# Patient Record
Sex: MALE | Race: WHITE | NOT HISPANIC OR LATINO | Employment: OTHER | URBAN - METROPOLITAN AREA
[De-identification: names, ages, dates, MRNs, and addresses within clinical notes are randomized per-mention and may not be internally consistent; named-entity substitution may affect disease eponyms.]

---

## 2017-02-04 DIAGNOSIS — I10 ESSENTIAL HYPERTENSION: ICD-10-CM

## 2017-02-05 RX ORDER — AMLODIPINE BESYLATE 5 MG/1
TABLET ORAL
Qty: 90 TABLET | Refills: 0 | Status: SHIPPED | OUTPATIENT
Start: 2017-02-05 | End: 2017-04-05 | Stop reason: SDUPTHER

## 2017-02-06 RX ORDER — LOSARTAN POTASSIUM 100 MG/1
TABLET ORAL
Qty: 90 TABLET | Refills: 0 | Status: SHIPPED | OUTPATIENT
Start: 2017-02-06 | End: 2017-04-05 | Stop reason: SDUPTHER

## 2017-04-05 ENCOUNTER — OFFICE VISIT (OUTPATIENT)
Dept: FAMILY MEDICINE | Facility: CLINIC | Age: 67
End: 2017-04-05
Payer: COMMERCIAL

## 2017-04-05 VITALS
HEIGHT: 72 IN | TEMPERATURE: 99 F | DIASTOLIC BLOOD PRESSURE: 98 MMHG | BODY MASS INDEX: 27.04 KG/M2 | OXYGEN SATURATION: 96 % | HEART RATE: 82 BPM | WEIGHT: 199.63 LBS | SYSTOLIC BLOOD PRESSURE: 146 MMHG

## 2017-04-05 DIAGNOSIS — Q64.32 CONGENITAL STRICTURE OF URETHRA: ICD-10-CM

## 2017-04-05 DIAGNOSIS — Z12.11 COLON CANCER SCREENING: ICD-10-CM

## 2017-04-05 DIAGNOSIS — Z00.00 ROUTINE MEDICAL EXAM: Primary | ICD-10-CM

## 2017-04-05 DIAGNOSIS — E66.3 OVERWEIGHT (BMI 25.0-29.9): ICD-10-CM

## 2017-04-05 DIAGNOSIS — F43.9 STRESS: ICD-10-CM

## 2017-04-05 DIAGNOSIS — J30.89 NON-SEASONAL ALLERGIC RHINITIS, UNSPECIFIED ALLERGIC RHINITIS TRIGGER: ICD-10-CM

## 2017-04-05 DIAGNOSIS — K21.9 GASTROESOPHAGEAL REFLUX DISEASE, ESOPHAGITIS PRESENCE NOT SPECIFIED: ICD-10-CM

## 2017-04-05 DIAGNOSIS — I10 ESSENTIAL HYPERTENSION: ICD-10-CM

## 2017-04-05 DIAGNOSIS — R97.20 ELEVATED PSA: ICD-10-CM

## 2017-04-05 DIAGNOSIS — R73.03 PREDIABETES: ICD-10-CM

## 2017-04-05 DIAGNOSIS — F41.9 MILD ANXIETY: ICD-10-CM

## 2017-04-05 PROCEDURE — 99396 PREV VISIT EST AGE 40-64: CPT | Mod: S$GLB,,, | Performed by: INTERNAL MEDICINE

## 2017-04-05 PROCEDURE — 99999 PR PBB SHADOW E&M-EST. PATIENT-LVL III: CPT | Mod: PBBFAC,,, | Performed by: INTERNAL MEDICINE

## 2017-04-05 PROCEDURE — 3077F SYST BP >= 140 MM HG: CPT | Mod: S$GLB,,, | Performed by: INTERNAL MEDICINE

## 2017-04-05 PROCEDURE — 3080F DIAST BP >= 90 MM HG: CPT | Mod: S$GLB,,, | Performed by: INTERNAL MEDICINE

## 2017-04-05 RX ORDER — AMLODIPINE BESYLATE 5 MG/1
TABLET ORAL
Qty: 90 TABLET | Refills: 1 | Status: SHIPPED | OUTPATIENT
Start: 2017-04-05 | End: 2017-10-11 | Stop reason: SDUPTHER

## 2017-04-05 RX ORDER — FLUTICASONE PROPIONATE 50 MCG
1 SPRAY, SUSPENSION (ML) NASAL DAILY
Qty: 48 G | Refills: 1 | Status: SHIPPED | OUTPATIENT
Start: 2017-04-05 | End: 2018-01-18 | Stop reason: SDUPTHER

## 2017-04-05 RX ORDER — CITALOPRAM 10 MG/1
10 TABLET ORAL DAILY
Qty: 90 TABLET | Refills: 1 | Status: SHIPPED | OUTPATIENT
Start: 2017-04-05 | End: 2017-10-11 | Stop reason: SDUPTHER

## 2017-04-05 RX ORDER — LOSARTAN POTASSIUM 100 MG/1
100 TABLET ORAL DAILY
Qty: 90 TABLET | Refills: 1 | Status: SHIPPED | OUTPATIENT
Start: 2017-04-05 | End: 2017-10-11 | Stop reason: SDUPTHER

## 2017-04-05 NOTE — MR AVS SNAPSHOT
[facility]  [address]  [place] [address]  Phone: [phone]  Fax: [phone]                  Vinnie Scott   2017 9:00 AM   Office Visit    Description:  Male : 1950   Provider:  Janine Acuña MD   Department:  [facility]           Reason for Visit     Anxiety     Hypertension     Cough           Diagnoses this Visit        Comments    Routine medical exam    -  Primary     Essential hypertension         Gastroesophageal reflux disease, esophagitis presence not specified         Prediabetes         Non-seasonal allergic rhinitis, unspecified allergic rhinitis trigger         Stress         Overweight (BMI 25.0-29.9)         Colon cancer screening         Elevated PSA         Mild anxiety                To Do List           Future Appointments        Provider Department Dept Phone    2017 8:15 AM LAB, [place] [facility] [phone]    2017 9:15 AM Eligio Culp Jr., MD [facility] [phone]      Goals (5 Years of Data)              3/11/16    4/9/15    3/17/14    HEMOGLOBIN A1C < 7.0   6.1  5.8  5.9    Related Problems    Prediabetes    Increase water intake             Follow-Up and Disposition     Return in about 3 months (around 2017), or if symptoms worsen or fail to improve, for follow up chronic medical conditions..       These Medications        Disp Refills Start End    amlodipine (NORVASC) 5 MG tablet 90 tablet 1 2017     TAKE 1 TABLET(5 MG) BY MOUTH EVERY DAY    Pharmacy: [facility] - [facility] [address] AT [address] [facility] Ph #: [phone]       losartan (COZAAR) 100 MG tablet 90 tablet 1 2017     Take 1 tablet (100 mg total) by mouth once daily. - Oral    Pharmacy: [facility] - [facility] [address] AT [address] [facility] Ph #: [phone]       fluticasone  (FLONASE) 50 mcg/actuation nasal spray 48 g 1 4/5/2017     1 spray by Each Nare route once daily. - Each Nare    Pharmacy: [facility] - [facility] [address] AT [address] to [facility] Ph #: [phone]       citalopram (CELEXA) 10 MG tablet 90 tablet 1 4/5/2017 4/5/2018    Take 1 tablet (10 mg total) by mouth once daily. - Oral    Pharmacy: [facility] - [place], [place] - [address] AT [address] to [facility] Ph #: [phone]         [facility]     [facility] On Call Nurse Care Line - 24/7 Assistance  Unless otherwise directed by your provider, please contact [facility], our nurse care line that is available for 24/7 assistance.     Registered nurses in the [facility] provide: appointment scheduling, clinical advisement, health education, and other advisory services.  Call: [phone] (toll free)               Medications           Message regarding Medications     Verify the changes and/or additions to your medication regime listed below are the same as discussed with your clinician today.  If any of these changes or additions are incorrect, please notify your healthcare provider.        START taking these NEW medications        Refills    citalopram (CELEXA) 10 MG tablet 1    Sig: Take 1 tablet (10 mg total) by mouth once daily.    Class: Normal    Route: Oral      CHANGE how you are taking these medications     Start Taking Instead of    losartan (COZAAR) 100 MG tablet losartan (COZAAR) 100 MG tablet    Dosage:  Take 1 tablet (100 mg total) by mouth once daily. Dosage:  TAKE 1 TABLET BY MOUTH DAILY    Reason for Change:  Reorder            Verify that the below list of medications is an accurate representation of the medications you are currently taking.  If none reported, the list may be blank. If incorrect, please contact your healthcare provider. Carry this list with you in case of emergency.            Current Medications     amlodipine (NORVASC) 5 MG tablet TAKE 1 TABLET(5 MG) BY MOUTH EVERY DAY    diphenhydrAMINE (SOMINEX) 25 mg tablet Take 25 mg by mouth nightly as needed for Insomnia.    fexofenadine (ALLEGRA) 180 MG tablet Take 1 tablet (180 mg total) by mouth once daily.    fluticasone (FLONASE) 50 mcg/actuation nasal spray 1 spray by Each Nare route once daily.    losartan (COZAAR) 100 MG tablet Take 1 tablet (100 mg total) by mouth once daily.    omega-3 fatty acids-vitamin E (FISH OIL) 1,000 mg Cap Take 1 capsule by mouth Daily. 1 Capsule Oral Every day    omeprazole (PRILOSEC) 20 MG capsule Take 2 capsules (40 mg total) by mouth once daily.    alprazolam (XANAX) 0.25 MG tablet Take 1 tablet (0.25 mg total) by mouth daily as needed for Anxiety.    citalopram (CELEXA) 10 MG tablet Take 1 tablet (10 mg total) by mouth once daily.    levocetirizine (XYZAL) 5 MG tablet Take 1 tablet (5 mg total) by mouth every evening.    loratadine (CLARITIN) 10 mg tablet Take 1 tablet (10 mg total) by mouth once daily.    sildenafil (VIAGRA) 100 MG tablet Take 1 tablet (100 mg total) by mouth daily as needed for Erectile Dysfunction.           Clinical Reference Information           Your Vitals Were     BP Pulse Temp Height Weight SpO2    146/98 82 98.9 °F (37.2 °C) (Oral) 6' (1.829 m) 90.5 kg (199 lb 10 oz) 96%    BMI                27.07 kg/m2          Blood Pressure          Most Recent Value    BP  (!)  146/98      Allergies as of 4/5/2017     Ciprofloxacin    Lisinopril      Immunizations Administered on Date of Encounter - 4/5/2017     None      Orders Placed During Today's Visit      Normal Orders This Visit    Case request GI: COLONOSCOPY     Future Labs/Procedures Expected by Expires    CBC auto differential  4/5/2017 4/5/2018    Comprehensive metabolic panel  4/5/2017 4/5/2018    Hemoglobin A1c  4/5/2017 4/5/2018    Lipid panel  4/5/2017 4/5/2018    PSA, Screening  4/5/2017 4/5/2018      Language  Assistance Services     ATTENTION: Language assistance services are available, free of charge. Please call [phone].      ATENCIÓN: Si habla barb, tiene a petit disposición servicios gratuitos de asistencia lingüística. Llame al [phone].     CHÚ Ý: N?u b?n nói Ti?ng Vi?t, có các d?ch v? h? tr? ngôn ng? mi?n phí dành cho b?n. G?i s? [phone].         [facility] complies with applicable Federal civil rights laws and does not discriminate on the basis of race, color, national origin, age, disability, or sex.

## 2017-04-07 ENCOUNTER — LAB VISIT (OUTPATIENT)
Dept: LAB | Facility: HOSPITAL | Age: 67
End: 2017-04-07
Attending: INTERNAL MEDICINE
Payer: COMMERCIAL

## 2017-04-07 DIAGNOSIS — R97.20 ELEVATED PSA: ICD-10-CM

## 2017-04-07 DIAGNOSIS — I10 ESSENTIAL HYPERTENSION: ICD-10-CM

## 2017-04-07 DIAGNOSIS — R73.03 PREDIABETES: ICD-10-CM

## 2017-04-07 LAB
ALBUMIN SERPL BCP-MCNC: 3.9 G/DL
ALP SERPL-CCNC: 107 U/L
ALT SERPL W/O P-5'-P-CCNC: 114 U/L
ANION GAP SERPL CALC-SCNC: 12 MMOL/L
AST SERPL-CCNC: 103 U/L
BASOPHILS # BLD AUTO: 0.04 K/UL
BASOPHILS NFR BLD: 0.7 %
BILIRUB SERPL-MCNC: 1.4 MG/DL
BUN SERPL-MCNC: 9 MG/DL
CALCIUM SERPL-MCNC: 9.4 MG/DL
CHLORIDE SERPL-SCNC: 103 MMOL/L
CHOLEST/HDLC SERPL: 2.6 {RATIO}
CO2 SERPL-SCNC: 25 MMOL/L
COMPLEXED PSA SERPL-MCNC: 3 NG/ML
CREAT SERPL-MCNC: 0.8 MG/DL
DIFFERENTIAL METHOD: ABNORMAL
EOSINOPHIL # BLD AUTO: 0.2 K/UL
EOSINOPHIL NFR BLD: 3.1 %
ERYTHROCYTE [DISTWIDTH] IN BLOOD BY AUTOMATED COUNT: 13.9 %
EST. GFR  (AFRICAN AMERICAN): >60 ML/MIN/1.73 M^2
EST. GFR  (NON AFRICAN AMERICAN): >60 ML/MIN/1.73 M^2
ESTIMATED AVG GLUCOSE: 105 MG/DL
GLUCOSE SERPL-MCNC: 89 MG/DL
HBA1C MFR BLD HPLC: 5.3 %
HCT VFR BLD AUTO: 46.9 %
HDL/CHOLESTEROL RATIO: 37.8 %
HDLC SERPL-MCNC: 209 MG/DL
HDLC SERPL-MCNC: 79 MG/DL
HGB BLD-MCNC: 15.4 G/DL
LDLC SERPL CALC-MCNC: 121.4 MG/DL
LYMPHOCYTES # BLD AUTO: 1.5 K/UL
LYMPHOCYTES NFR BLD: 25.9 %
MCH RBC QN AUTO: 32 PG
MCHC RBC AUTO-ENTMCNC: 32.8 %
MCV RBC AUTO: 98 FL
MONOCYTES # BLD AUTO: 0.7 K/UL
MONOCYTES NFR BLD: 11.7 %
NEUTROPHILS # BLD AUTO: 3.4 K/UL
NEUTROPHILS NFR BLD: 58.3 %
NONHDLC SERPL-MCNC: 130 MG/DL
PLATELET # BLD AUTO: 219 K/UL
PMV BLD AUTO: 9.1 FL
POTASSIUM SERPL-SCNC: 4.5 MMOL/L
PROT SERPL-MCNC: 7.6 G/DL
RBC # BLD AUTO: 4.81 M/UL
SODIUM SERPL-SCNC: 140 MMOL/L
TRIGL SERPL-MCNC: 43 MG/DL
WBC # BLD AUTO: 5.88 K/UL

## 2017-04-07 PROCEDURE — 84153 ASSAY OF PSA TOTAL: CPT

## 2017-04-07 PROCEDURE — 80053 COMPREHEN METABOLIC PANEL: CPT

## 2017-04-07 PROCEDURE — 36415 COLL VENOUS BLD VENIPUNCTURE: CPT | Mod: PO

## 2017-04-07 PROCEDURE — 80061 LIPID PANEL: CPT

## 2017-04-07 PROCEDURE — 85025 COMPLETE CBC W/AUTO DIFF WBC: CPT

## 2017-04-07 PROCEDURE — 83036 HEMOGLOBIN GLYCOSYLATED A1C: CPT

## 2017-04-10 ENCOUNTER — TELEPHONE (OUTPATIENT)
Dept: FAMILY MEDICINE | Facility: CLINIC | Age: 67
End: 2017-04-10

## 2017-04-10 DIAGNOSIS — R79.89 ELEVATED LFTS: Primary | ICD-10-CM

## 2017-04-10 NOTE — TELEPHONE ENCOUNTER
Please call patient: his recent blood work all looked ok except his liver enzymes have increased significantly. I would like to do some additional blood work and check a liver ultrasound to further evaluate.

## 2017-04-11 ENCOUNTER — OFFICE VISIT (OUTPATIENT)
Dept: UROLOGY | Facility: CLINIC | Age: 67
End: 2017-04-11
Payer: COMMERCIAL

## 2017-04-11 VITALS
HEIGHT: 72 IN | BODY MASS INDEX: 27.14 KG/M2 | HEART RATE: 77 BPM | WEIGHT: 200.38 LBS | DIASTOLIC BLOOD PRESSURE: 99 MMHG | SYSTOLIC BLOOD PRESSURE: 182 MMHG

## 2017-04-11 DIAGNOSIS — N35.914 ANTERIOR URETHRAL STRICTURE: Primary | ICD-10-CM

## 2017-04-11 DIAGNOSIS — R33.9 RETENTION OF URINE: ICD-10-CM

## 2017-04-11 PROCEDURE — 99213 OFFICE O/P EST LOW 20 MIN: CPT | Mod: S$GLB,,, | Performed by: UROLOGY

## 2017-04-11 PROCEDURE — 3077F SYST BP >= 140 MM HG: CPT | Mod: S$GLB,,, | Performed by: UROLOGY

## 2017-04-11 PROCEDURE — 99999 PR PBB SHADOW E&M-EST. PATIENT-LVL III: CPT | Mod: PBBFAC,,, | Performed by: UROLOGY

## 2017-04-11 PROCEDURE — 1160F RVW MEDS BY RX/DR IN RCRD: CPT | Mod: S$GLB,,, | Performed by: UROLOGY

## 2017-04-11 PROCEDURE — 3080F DIAST BP >= 90 MM HG: CPT | Mod: S$GLB,,, | Performed by: UROLOGY

## 2017-04-11 NOTE — PROGRESS NOTES
Subjective:       Patient ID: Vinnie Scott is a 66 y.o. male.    Chief Complaint: Follow-up (per patient he stated that he a prostate infection was given an antibiotics)    HPI patient is here to do a history of urethral stricture.  He's been using the VPI dilator intermittently but feels like he would like to do CIC on a daily basis.  I suggest a cystoscopy but he like to hold off.  He states that after he dilates he has pain for a couple days and is unable to do it again until I pain goes away his postvoid residual is 60 cc today and his urine is clear.  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    Mild anxiety     Overweight     Personal history of colonic polyps October 2010, May 2015    Prediabetes     Urinary anastomotic stricture        Past Surgical History:   Procedure Laterality Date    ADENOIDECTOMY      FRACTURE SURGERY      Left collarbone    TONSILLECTOMY      urinary stricture clearing      VASECTOMY         Family History   Problem Relation Age of Onset    Lung cancer Father     Diabetes Father     Uterine cancer Mother     Heart disease Mother     Drug abuse Brother     Alcohol abuse Brother     Prostate cancer Neg Hx     Colon cancer Neg Hx        Social History     Social History    Marital status:      Spouse name: N/A    Number of children: 2    Years of education: N/A     Occupational History    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[employer]     Social History Main Topics    Smoking status: Former Smoker     Types:  Cigarettes     Quit date: 9/20/2011    Smokeless tobacco: Not on file    Alcohol use Yes      Comment: 4-5 beers daily    Drug use: No    Sexual activity: Not on file     Other Topics Concern    Not on file     Social History Narrative       Allergies:  Ciprofloxacin and Lisinopril    Medications:    Current Outpatient Prescriptions:     amlodipine (NORVASC) 5 MG tablet, TAKE 1 TABLET(5 MG) BY MOUTH EVERY DAY, Disp: 90 tablet, Rfl: 1    citalopram (CELEXA) 10 MG tablet, Take 1 tablet (10 mg total) by mouth once daily., Disp: 90 tablet, Rfl: 1    diphenhydrAMINE (SOMINEX) 25 mg tablet, Take 25 mg by mouth nightly as needed for Insomnia., Disp: , Rfl:     fexofenadine (ALLEGRA) 180 MG tablet, Take 1 tablet (180 mg total) by mouth once daily., Disp: 90 tablet, Rfl: 0    fluticasone (FLONASE) 50 mcg/actuation nasal spray, 1 spray by Each Nare route once daily., Disp: 48 g, Rfl: 1    losartan (COZAAR) 100 MG tablet, Take 1 tablet (100 mg total) by mouth once daily., Disp: 90 tablet, Rfl: 1    omega-3 fatty acids-vitamin E (FISH OIL) 1,000 mg Cap, Take 1 capsule by mouth Daily. 1 Capsule Oral Every day, Disp: , Rfl:     omeprazole (PRILOSEC) 20 MG capsule, Take 2 capsules (40 mg total) by mouth once daily., Disp: 180 capsule, Rfl: 1    alprazolam (XANAX) 0.25 MG tablet, Take 1 tablet (0.25 mg total) by mouth daily as needed for Anxiety., Disp: 90 tablet, Rfl: 0    levocetirizine (XYZAL) 5 MG tablet, Take 1 tablet (5 mg total) by mouth every evening., Disp: 30 tablet, Rfl: 2    loratadine (CLARITIN) 10 mg tablet, Take 1 tablet (10 mg total) by mouth once daily., Disp: 90 tablet, Rfl: 1    sildenafil (VIAGRA) 100 MG tablet, Take 1 tablet (100 mg total) by mouth daily as needed for Erectile Dysfunction., Disp: 6 tablet, Rfl: 12    Review of Systems   Constitutional: Negative for activity change, appetite change, chills, diaphoresis, fatigue, fever and unexpected weight change.   HENT: Negative for  congestion, dental problem, hearing loss, mouth sores, postnasal drip, rhinorrhea, sinus pressure and trouble swallowing.    Eyes: Negative for pain, discharge and itching.   Respiratory: Negative for apnea, cough, choking, chest tightness, shortness of breath and wheezing.    Cardiovascular: Negative for chest pain, palpitations and leg swelling.   Gastrointestinal: Negative for abdominal distention, abdominal pain, anal bleeding, blood in stool, constipation, diarrhea, nausea, rectal pain and vomiting.   Endocrine: Negative for polydipsia and polyuria.   Genitourinary: Negative for decreased urine volume, difficulty urinating, discharge, dysuria, enuresis, flank pain, frequency, genital sores, hematuria, penile pain, penile swelling, scrotal swelling, testicular pain and urgency.   Musculoskeletal: Negative for arthralgias, back pain and myalgias.   Skin: Negative for color change, rash and wound.   Neurological: Negative for dizziness, syncope, speech difficulty, light-headedness and headaches.   Hematological: Negative for adenopathy. Does not bruise/bleed easily.   Psychiatric/Behavioral: Negative for behavioral problems, confusion, hallucinations and sleep disturbance.       Objective:      Physical Exam   Constitutional: He appears well-developed.   HENT:   Head: Normocephalic.   Cardiovascular: Normal rate.    Pulmonary/Chest: Effort normal.   Abdominal: Soft.   Genitourinary: Prostate normal.   Genitourinary Comments: 25-30 g benign   Neurological: He is alert.   Skin: Skin is warm.     Psychiatric: He has a normal mood and affect.       Assessment:       1. Anterior urethral stricture        Plan:       Vinnie Luu was seen today for follow-up.    Diagnoses and all orders for this visit:    Anterior urethral stricture        patient will do CIC on a daily basis with a 14 Romanian straight catheter.  He will let me know if he has any trouble and he may end up needing to have cystoscopy I'll see him back in  6 months

## 2017-04-12 ENCOUNTER — LAB VISIT (OUTPATIENT)
Dept: LAB | Facility: HOSPITAL | Age: 67
End: 2017-04-12
Attending: INTERNAL MEDICINE
Payer: COMMERCIAL

## 2017-04-12 DIAGNOSIS — R79.89 ELEVATED LFTS: ICD-10-CM

## 2017-04-12 LAB
ALBUMIN SERPL BCP-MCNC: 4 G/DL
ALP SERPL-CCNC: 106 U/L
ALT SERPL W/O P-5'-P-CCNC: 119 U/L
AST SERPL-CCNC: 118 U/L
BILIRUB DIRECT SERPL-MCNC: 0.3 MG/DL
BILIRUB SERPL-MCNC: 0.5 MG/DL
HAV IGM SERPL QL IA: NEGATIVE
HBV CORE IGM SERPL QL IA: NEGATIVE
HBV SURFACE AG SERPL QL IA: NEGATIVE
HCV AB SERPL QL IA: NEGATIVE
PROT SERPL-MCNC: 7.5 G/DL

## 2017-04-12 PROCEDURE — 80074 ACUTE HEPATITIS PANEL: CPT

## 2017-04-12 PROCEDURE — 36415 COLL VENOUS BLD VENIPUNCTURE: CPT | Mod: PO

## 2017-04-12 PROCEDURE — 80076 HEPATIC FUNCTION PANEL: CPT

## 2017-04-24 ENCOUNTER — HOSPITAL ENCOUNTER (OUTPATIENT)
Dept: RADIOLOGY | Facility: HOSPITAL | Age: 67
Discharge: HOME | End: 2017-04-24
Attending: INTERNAL MEDICINE
Payer: COMMERCIAL

## 2017-04-24 DIAGNOSIS — R79.89 ELEVATED LFTS: ICD-10-CM

## 2017-04-24 DIAGNOSIS — R79.89 ELEVATED LFTS: Primary | ICD-10-CM

## 2017-04-24 PROCEDURE — 76700 US EXAM ABDOM COMPLETE: CPT | Mod: 26,,, | Performed by: RADIOLOGY

## 2017-04-24 PROCEDURE — 76700 US EXAM ABDOM COMPLETE: CPT | Mod: TC

## 2017-04-30 ENCOUNTER — DOCUMENTATION ONLY (OUTPATIENT)
Dept: TRANSPLANT | Facility: CLINIC | Age: 67
End: 2017-04-30

## 2017-04-30 NOTE — NURSING
Pt records reviewed.   Pt will be referred to Hepatology.    Initial referral received  from the workque.   Referring Provider/diagnosis  Janine Acuña MD    Diagnosis: Elevated LFTs       Referral letter sent to provider and patient.

## 2017-04-30 NOTE — LETTER
April 30, 2017    Vinnie Scott  [address] [address]      Dear Vinnie Scott:    Your doctor has referred you to the [facility]. You will be contacted by our office and an initial appointment will then be scheduled for you.    We look forward to seeing you soon. If you have any further questions, please contact us at [phone].       Sincerely,        [facility]   [address] [place] [address]  [phone]

## 2017-04-30 NOTE — LETTER
April 30, 2017    Janine Acuña MD  [address]  [place] [address]      Dear Dr. Acuña    Patient: Vinnie Scott   MR Number: [id number]   YOB: 1950     Thank you for the referral of Vinnie Scott to the [facility]. An initial appointment will be scheduled for your patient with one of our Hepatologists.      Thank you again for your trust in our program.  If there is anything we can do for you or your staff, please feel free to contact us.        Sincerely,        [facility]  [address] [place] [address]  [phone]

## 2017-05-01 ENCOUNTER — TELEPHONE (OUTPATIENT)
Dept: HEPATOLOGY | Facility: CLINIC | Age: 67
End: 2017-05-01

## 2017-05-01 NOTE — TELEPHONE ENCOUNTER
----- Message from Lorie De León sent at 4/30/2017 11:50 AM CDT -----  Pt records reviewed.   Pt will be referred to Hepatology.    Initial referral received  from the workque.   Referring Provider/diagnosis  Janine Acuña MD   Diagnosis: Elevated LFTs      Referral letter sent to provider and patient.

## 2017-05-30 ENCOUNTER — ANESTHESIA EVENT (OUTPATIENT)
Dept: ENDOSCOPY | Facility: HOSPITAL | Age: 67
End: 2017-05-30
Payer: COMMERCIAL

## 2017-05-31 ENCOUNTER — HOSPITAL ENCOUNTER (OUTPATIENT)
Facility: HOSPITAL | Age: 67
Discharge: HOME | End: 2017-05-31
Attending: INTERNAL MEDICINE | Admitting: INTERNAL MEDICINE
Payer: COMMERCIAL

## 2017-05-31 ENCOUNTER — ANESTHESIA (OUTPATIENT)
Dept: ENDOSCOPY | Facility: HOSPITAL | Age: 67
End: 2017-05-31
Payer: COMMERCIAL

## 2017-05-31 VITALS
TEMPERATURE: 98 F | SYSTOLIC BLOOD PRESSURE: 108 MMHG | WEIGHT: 189 LBS | HEIGHT: 72 IN | DIASTOLIC BLOOD PRESSURE: 63 MMHG | OXYGEN SATURATION: 96 % | BODY MASS INDEX: 25.6 KG/M2 | HEART RATE: 82 BPM | RESPIRATION RATE: 18 BRPM

## 2017-05-31 DIAGNOSIS — Z12.11 COLON CANCER SCREENING: ICD-10-CM

## 2017-05-31 PROCEDURE — 27201012 HC FORCEPS, HOT/COLD, DISP: Performed by: INTERNAL MEDICINE

## 2017-05-31 PROCEDURE — 27201089 HC SNARE, DISP (ANY): Performed by: INTERNAL MEDICINE

## 2017-05-31 PROCEDURE — 88305 TISSUE EXAM BY PATHOLOGIST: CPT | Mod: 26,,, | Performed by: PATHOLOGY

## 2017-05-31 PROCEDURE — 63600175 PHARM REV CODE 636 W HCPCS: Performed by: NURSE ANESTHETIST, CERTIFIED REGISTERED

## 2017-05-31 PROCEDURE — 25000003 PHARM REV CODE 250: Performed by: NURSE ANESTHETIST, CERTIFIED REGISTERED

## 2017-05-31 PROCEDURE — 45380 COLONOSCOPY AND BIOPSY: CPT | Performed by: INTERNAL MEDICINE

## 2017-05-31 PROCEDURE — 37000008 HC ANESTHESIA 1ST 15 MINUTES: Performed by: INTERNAL MEDICINE

## 2017-05-31 PROCEDURE — 25000003 PHARM REV CODE 250: Performed by: ANESTHESIOLOGY

## 2017-05-31 PROCEDURE — 45388 COLONOSCOPY W/ABLATION: CPT | Performed by: INTERNAL MEDICINE

## 2017-05-31 PROCEDURE — D9220A PRA ANESTHESIA: Mod: PT,CRNA,, | Performed by: NURSE ANESTHETIST, CERTIFIED REGISTERED

## 2017-05-31 PROCEDURE — D9220A PRA ANESTHESIA: Mod: PT,ANES,, | Performed by: ANESTHESIOLOGY

## 2017-05-31 PROCEDURE — 88305 TISSUE EXAM BY PATHOLOGIST: CPT | Performed by: PATHOLOGY

## 2017-05-31 PROCEDURE — 37000009 HC ANESTHESIA EA ADD 15 MINS: Performed by: INTERNAL MEDICINE

## 2017-05-31 PROCEDURE — 45385 COLONOSCOPY W/LESION REMOVAL: CPT | Performed by: INTERNAL MEDICINE

## 2017-05-31 RX ORDER — PHENYLEPHRINE HCL IN 0.9% NACL 1 MG/10 ML
SYRINGE (ML) INTRAVENOUS
Status: DISCONTINUED
Start: 2017-05-31 | End: 2017-05-31 | Stop reason: HOSPADM

## 2017-05-31 RX ORDER — PROPOFOL 10 MG/ML
VIAL (ML) INTRAVENOUS
Status: DISCONTINUED
Start: 2017-05-31 | End: 2017-05-31 | Stop reason: HOSPADM

## 2017-05-31 RX ORDER — SODIUM CHLORIDE 9 MG/ML
INJECTION, SOLUTION INTRAVENOUS CONTINUOUS
Status: DISCONTINUED | OUTPATIENT
Start: 2017-05-31 | End: 2017-05-31 | Stop reason: HOSPADM

## 2017-05-31 RX ORDER — LIDOCAINE HYDROCHLORIDE 20 MG/ML
INJECTION, SOLUTION EPIDURAL; INFILTRATION; INTRACAUDAL; PERINEURAL
Status: DISCONTINUED
Start: 2017-05-31 | End: 2017-05-31 | Stop reason: HOSPADM

## 2017-05-31 RX ORDER — LIDOCAINE HYDROCHLORIDE 10 MG/ML
1 INJECTION, SOLUTION EPIDURAL; INFILTRATION; INTRACAUDAL; PERINEURAL ONCE
Status: DISCONTINUED | OUTPATIENT
Start: 2017-05-31 | End: 2017-05-31 | Stop reason: HOSPADM

## 2017-05-31 RX ORDER — LIDOCAINE HCL/PF 100 MG/5ML
SYRINGE (ML) INTRAVENOUS
Status: DISCONTINUED | OUTPATIENT
Start: 2017-05-31 | End: 2017-05-31

## 2017-05-31 RX ORDER — PHENYLEPHRINE HYDROCHLORIDE 10 MG/ML
INJECTION INTRAVENOUS
Status: DISCONTINUED | OUTPATIENT
Start: 2017-05-31 | End: 2017-05-31

## 2017-05-31 RX ORDER — PROPOFOL 10 MG/ML
VIAL (ML) INTRAVENOUS
Status: DISCONTINUED | OUTPATIENT
Start: 2017-05-31 | End: 2017-05-31

## 2017-05-31 RX ADMIN — PROPOFOL 50 MG: 10 INJECTION, EMULSION INTRAVENOUS at 08:05

## 2017-05-31 RX ADMIN — PROPOFOL 150 MG: 10 INJECTION, EMULSION INTRAVENOUS at 08:05

## 2017-05-31 RX ADMIN — SODIUM CHLORIDE: 0.9 INJECTION, SOLUTION INTRAVENOUS at 07:05

## 2017-05-31 RX ADMIN — PHENYLEPHRINE HYDROCHLORIDE 100 MCG: 10 INJECTION INTRAVENOUS at 08:05

## 2017-05-31 RX ADMIN — LIDOCAINE HYDROCHLORIDE 5 ML: 20 INJECTION, SOLUTION INTRAVENOUS at 08:05

## 2017-05-31 NOTE — H&P
Pre-Procedure H&P:  Reason for Procedure: h/o colon polyps    HPI:  Pt is a 67 y.o. 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    Mild anxiety     Overweight     Personal history of colonic polyps October 2010, May 2015    Prediabetes     Urinary anastomotic stricture        Past Surgical History:   Procedure Laterality Date    ADENOIDECTOMY      FRACTURE SURGERY      Left collarbone    TONSILLECTOMY      urinary stricture clearing      VASECTOMY         Family History   Problem Relation Age of Onset    Lung cancer Father     Diabetes Father     Uterine cancer Mother     Heart disease Mother     Drug abuse Brother     Alcohol abuse Brother     Prostate cancer Neg Hx     Colon cancer Neg Hx        Social History     Social History    Marital status:      Spouse name: N/A    Number of children: 2    Years of education: N/A     Occupational History    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[employer]     Social History Main Topics    Smoking status: Former Smoker     Types: Cigarettes     Quit date: 9/20/2011    Smokeless tobacco: Not on file    Alcohol use Yes      Comment: 4-5 beers daily    Drug use: No    Sexual activity: Not on file     Other Topics Concern    Not on file     Social History Narrative    No narrative on file       Endoscopic History:      Review of patient's allergies indicates:   Allergen Reactions    Ciprofloxacin      Other reaction(s): Muscle pain    Lisinopril Other (See Comments)     cough       No current facility-administered medications on file prior to encounter.      Current Outpatient  Prescriptions on File Prior to Encounter   Medication Sig Dispense Refill    amlodipine (NORVASC) 5 MG tablet TAKE 1 TABLET(5 MG) BY MOUTH EVERY DAY 90 tablet 1    citalopram (CELEXA) 10 MG tablet Take 1 tablet (10 mg total) by mouth once daily. 90 tablet 1    diphenhydrAMINE (SOMINEX) 25 mg tablet Take 25 mg by mouth nightly as needed for Insomnia.      fexofenadine (ALLEGRA) 180 MG tablet Take 1 tablet (180 mg total) by mouth once daily. 90 tablet 0    fluticasone (FLONASE) 50 mcg/actuation nasal spray 1 spray by Each Nare route once daily. 48 g 1    losartan (COZAAR) 100 MG tablet Take 1 tablet (100 mg total) by mouth once daily. 90 tablet 1    omega-3 fatty acids-vitamin E (FISH OIL) 1,000 mg Cap Take 1 capsule by mouth Daily. 1 Capsule Oral Every day      omeprazole (PRILOSEC) 20 MG capsule Take 2 capsules (40 mg total) by mouth once daily. 180 capsule 1    alprazolam (XANAX) 0.25 MG tablet Take 1 tablet (0.25 mg total) by mouth daily as needed for Anxiety. 90 tablet 0    levocetirizine (XYZAL) 5 MG tablet Take 1 tablet (5 mg total) by mouth every evening. 30 tablet 2    loratadine (CLARITIN) 10 mg tablet Take 1 tablet (10 mg total) by mouth once daily. 90 tablet 1    sildenafil (VIAGRA) 100 MG tablet Take 1 tablet (100 mg total) by mouth daily as needed for Erectile Dysfunction. 6 tablet 12       Current Facility-Administered Medications:     0.9%  NaCl infusion, , Intravenous, Continuous, Magalis Wagner MD, Last Rate: 10 mL/hr at 05/31/17 0711    lidocaine  20 mg/mL (2%) 20 mg/mL (2 %) injection, , , ,     lidocaine (PF) 10 mg/ml (1%) injection 10 mg, 1 mL, Intradermal, Once, Magalis Wagner MD    propofol (DIPRIVAN) 10 mg/mL IVP injection, , , ,     ROS: Negative x 10    Patient Vitals for the past 24 hrs:   BP Temp Temp src Pulse Resp SpO2 Height Weight   05/31/17 0702 (!) 141/76 97.6 °F (36.4 °C) Oral 95 18 96 % 6' (1.829 m) 85.7 kg (189 lb)       Gen: Well developed, well  nourished, no apparent distress  HEENT: Anicteric, PERRLA  CV: S1, S2, no murmers/rubs, non-displaced PMI  Lungs: CTA-B, normal excursion  Abd: Soft, NT, ND, normal BS's, no HSM  Ext: No c/c/e, 1+ DP pulses to BLE's  Neuro: CN II-XII grossly intact, no asterixis.  Skin: No rashes/lesions.  Psych: AA&O x 4    Assessment:  Pt. Is a 67 y.o. male with:  1. H/o colon polyps    Recommendations:  1. Colonoscopy  2. F/U with PCP      I would like to take this opportunity to thank you for this consult.  If you have any questions or concerns, please do not hesitate to contact me.

## 2017-05-31 NOTE — ANESTHESIA POSTPROCEDURE EVALUATION
Anesthesia Post Evaluation    Patient: Vinnie Scott    Procedure(s) Performed: Procedure(s) (LRB):  COLONOSCOPY (N/A)    Final Anesthesia Type: general  Patient location during evaluation: GI PACU  Patient participation: Yes- Able to Participate  Level of consciousness: awake and alert, awake and oriented  Post-procedure vital signs: reviewed and stable  Pain management: adequate  Airway patency: patent  PONV status at discharge: No PONV  Anesthetic complications: no      Cardiovascular status: blood pressure returned to baseline and hemodynamically stable  Respiratory status: unassisted, spontaneous ventilation and room air  Hydration status: euvolemic  Follow-up not needed.        Visit Vitals  BP 90/60 (BP Location: Left arm, Patient Position: Lying, BP Method: Automatic)   Pulse 67   Temp 36.4 °C (97.5 °F) (Oral)   Resp 18   Ht 6' (1.829 m)   Wt 85.7 kg (189 lb)   SpO2 96%   BMI 25.63 kg/m²       Pain/Josias Score: Pain Assessment Performed: Yes (5/31/2017  8:35 AM)  Presence of Pain: denies (5/31/2017  8:35 AM)  Josias Score: 9 (5/31/2017  8:35 AM)

## 2017-05-31 NOTE — ANESTHESIA PREPROCEDURE EVALUATION
05/31/2017  Vinnie Scott is a 67 y.o., male.    Anesthesia Evaluation    I have reviewed the Patient Summary Reports.     I have reviewed the Medications.     Review of Systems  Social:  Former Smoker, Alcohol Use    Cardiovascular:   Exercise tolerance: good Hypertension    Hepatic/GI:   GERD    Psych:   anxiety          Physical Exam  General:  Well nourished    Airway/Jaw/Neck:  Airway Findings: Mouth Opening: Normal Tongue: Normal  General Airway Assessment: Adult  Mallampati: II  TM Distance: Normal, at least 6 cm  Jaw/Neck Findings:  Neck ROM: Normal ROM      Dental:  Dental Findings: In tact   Chest/Lungs:  Chest/Lungs Findings: Clear to auscultation, Normal Respiratory Rate     Heart/Vascular:  Heart Findings: Rate: Normal        Mental Status:  Mental Status Findings:  Cooperative, Alert and Oriented         Anesthesia Plan  Type of Anesthesia, risks & benefits discussed:  Anesthesia Type:  general  Patient's Preference:   Intra-op Monitoring Plan: standard ASA monitors  Intra-op Monitoring Plan Comments:   Post Op Pain Control Plan:   Post Op Pain Control Plan Comments:   Induction:   IV  Beta Blocker:  Patient is not currently on a Beta-Blocker (No further documentation required).       Informed Consent: Patient understands risks and agrees with Anesthesia plan.  Questions answered. Anesthesia consent signed with patient.  ASA Score: 2     Day of Surgery Review of History & Physical:    H&P update referred to the surgeon.         Ready For Surgery From Anesthesia Perspective.

## 2017-06-05 ENCOUNTER — LAB VISIT (OUTPATIENT)
Dept: LAB | Facility: HOSPITAL | Age: 67
End: 2017-06-05
Attending: INTERNAL MEDICINE
Payer: COMMERCIAL

## 2017-06-05 DIAGNOSIS — D50.9 IRON DEFICIENCY ANEMIA, UNSPECIFIED: ICD-10-CM

## 2017-06-05 DIAGNOSIS — Z86.010 PERSONAL HISTORY OF COLONIC POLYPS: Primary | ICD-10-CM

## 2017-06-05 DIAGNOSIS — R94.5 NONSPECIFIC ABNORMAL RESULTS OF LIVER FUNCTION STUDY: ICD-10-CM

## 2017-06-05 LAB
A1AT SERPL-MCNC: 152 MG/DL
ALBUMIN SERPL BCP-MCNC: 3.7 G/DL
ALP SERPL-CCNC: 84 U/L
ALT SERPL W/O P-5'-P-CCNC: 28 U/L
AST SERPL-CCNC: 23 U/L
BILIRUB DIRECT SERPL-MCNC: 0.3 MG/DL
BILIRUB SERPL-MCNC: 0.9 MG/DL
CERULOPLASMIN SERPL-MCNC: 33 MG/DL
HBV CORE AB SERPL QL IA: POSITIVE
HEPATITIS A ANTIBODY, IGG: POSITIVE
IGA SERPL-MCNC: 366 MG/DL
INR PPP: 0.9
PROT SERPL-MCNC: 7.4 G/DL
PROTHROMBIN TIME: 9.9 SEC

## 2017-06-05 PROCEDURE — 86704 HEP B CORE ANTIBODY TOTAL: CPT

## 2017-06-05 PROCEDURE — 36415 COLL VENOUS BLD VENIPUNCTURE: CPT | Mod: PO

## 2017-06-05 PROCEDURE — 82390 ASSAY OF CERULOPLASMIN: CPT

## 2017-06-05 PROCEDURE — 80076 HEPATIC FUNCTION PANEL: CPT

## 2017-06-05 PROCEDURE — 85610 PROTHROMBIN TIME: CPT

## 2017-06-05 PROCEDURE — 86256 FLUORESCENT ANTIBODY TITER: CPT

## 2017-06-05 PROCEDURE — 82784 ASSAY IGA/IGD/IGG/IGM EACH: CPT

## 2017-06-05 PROCEDURE — 86790 VIRUS ANTIBODY NOS: CPT

## 2017-06-05 PROCEDURE — 83516 IMMUNOASSAY NONANTIBODY: CPT

## 2017-06-05 PROCEDURE — 86038 ANTINUCLEAR ANTIBODIES: CPT

## 2017-06-05 PROCEDURE — 86706 HEP B SURFACE ANTIBODY: CPT

## 2017-06-05 PROCEDURE — 82103 ALPHA-1-ANTITRYPSIN TOTAL: CPT

## 2017-06-06 LAB
ANA SER QL IF: NORMAL
HEP. B SURF AB, QUAL: NEGATIVE
HEP. B SURF AB, QUANT.: <3 MIU/ML
TTG IGA SER IA-ACNC: 7 UNITS

## 2017-06-08 LAB — SMOOTH MUSCLE AB TITR SER IF: ABNORMAL {TITER}

## 2017-06-20 ENCOUNTER — LAB VISIT (OUTPATIENT)
Dept: LAB | Facility: HOSPITAL | Age: 67
End: 2017-06-20
Attending: INTERNAL MEDICINE
Payer: COMMERCIAL

## 2017-06-20 DIAGNOSIS — R94.5 NONSPECIFIC ABNORMAL RESULTS OF LIVER FUNCTION STUDY: Primary | ICD-10-CM

## 2017-06-20 PROCEDURE — 36415 COLL VENOUS BLD VENIPUNCTURE: CPT | Mod: PO

## 2017-06-20 PROCEDURE — 87517 HEPATITIS B DNA QUANT: CPT

## 2017-06-22 LAB
HEPATITIS B VIRAL DNA - QUANTITATIVE: <10 IU/ML
HEPATITIS B VIRUS DNA: NOT DETECTED
LOG HBV IU/ML: <1 LOG (10) IU/ML

## 2017-07-05 ENCOUNTER — OFFICE VISIT (OUTPATIENT)
Dept: FAMILY MEDICINE | Facility: CLINIC | Age: 67
End: 2017-07-05
Payer: COMMERCIAL

## 2017-07-05 VITALS
DIASTOLIC BLOOD PRESSURE: 84 MMHG | OXYGEN SATURATION: 97 % | BODY MASS INDEX: 27.14 KG/M2 | WEIGHT: 200.38 LBS | HEIGHT: 72 IN | HEART RATE: 92 BPM | TEMPERATURE: 99 F | SYSTOLIC BLOOD PRESSURE: 132 MMHG

## 2017-07-05 DIAGNOSIS — F41.9 MILD ANXIETY: ICD-10-CM

## 2017-07-05 DIAGNOSIS — Z23 NEED FOR STREPTOCOCCUS PNEUMONIAE VACCINATION: ICD-10-CM

## 2017-07-05 DIAGNOSIS — E66.3 OVERWEIGHT (BMI 25.0-29.9): ICD-10-CM

## 2017-07-05 DIAGNOSIS — R05.9 COUGH: ICD-10-CM

## 2017-07-05 DIAGNOSIS — I10 ESSENTIAL HYPERTENSION: Primary | ICD-10-CM

## 2017-07-05 DIAGNOSIS — K21.9 GASTROESOPHAGEAL REFLUX DISEASE, ESOPHAGITIS PRESENCE NOT SPECIFIED: ICD-10-CM

## 2017-07-05 DIAGNOSIS — R79.89 ELEVATED LFTS: ICD-10-CM

## 2017-07-05 DIAGNOSIS — R73.03 PREDIABETES: ICD-10-CM

## 2017-07-05 DIAGNOSIS — J30.89 NON-SEASONAL ALLERGIC RHINITIS, UNSPECIFIED ALLERGIC RHINITIS TRIGGER: ICD-10-CM

## 2017-07-05 PROBLEM — Z12.11 COLON CANCER SCREENING: Status: RESOLVED | Noted: 2017-05-31 | Resolved: 2017-07-05

## 2017-07-05 PROCEDURE — 99214 OFFICE O/P EST MOD 30 MIN: CPT | Mod: 25,S$GLB,, | Performed by: INTERNAL MEDICINE

## 2017-07-05 PROCEDURE — 90471 IMMUNIZATION ADMIN: CPT | Mod: S$GLB,,, | Performed by: INTERNAL MEDICINE

## 2017-07-05 PROCEDURE — 90670 PCV13 VACCINE IM: CPT | Mod: S$GLB,,, | Performed by: INTERNAL MEDICINE

## 2017-07-05 PROCEDURE — 99999 PR PBB SHADOW E&M-EST. PATIENT-LVL III: CPT | Mod: PBBFAC,,, | Performed by: INTERNAL MEDICINE

## 2017-07-05 NOTE — PROGRESS NOTES
HISTORY OF PRESENT ILLNESS:  Vinnie Scott is a 67 y.o. male who presents to the clinic today for Anxiety; Gastroesophageal Reflux; and Follow-up  .  The patient presents to clinic today for follow-up of his hypertension, reflux, and anxiety.  He does not check his blood pressures at home. The patient denies any problems with cardiac chest pain, dizziness, palpitations, orthopnea, or lower extremity edema.  He states cough is much improved since he started a regular regimen of Allegra and Benadryl as well as saline nasal rinse and Flonase.  He does have a little bit of congestion today, but normally he states his cough is much improved.  He denies shortness of breath.  No sputum production.  He also reports significant improvement in his heartburn and reflux symptoms since he quit drinking.  He has decreased his omeprazole from 40 mg daily to 20 mg daily.  He did see GI for elevated LFTs.  They have normalized off alcohol.  He states he feels better overall.  He also had a colonoscopy and EGD.  Apparently GI does want to follow-up on his EGD as they saw a questionable abnormal blood vessel per patient's report.  I started the patient on Celexa at his last office visit.  He states that he is feeling much better regarding his anxiety.  Again, overall he states he feels much better.      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    Mild anxiety     Overweight     Personal history of colonic polyps October 2010, May 2015    Prediabetes     Urinary anastomotic stricture        PAST SURGICAL HISTORY:  Past Surgical History:   Procedure Laterality Date     ADENOIDECTOMY      COLONOSCOPY N/A 5/31/2017    Procedure: COLONOSCOPY;  Surgeon: Fatoumata Cook MD;  Location: [facility];  Service: Endoscopy;  Laterality: N/A;    FRACTURE SURGERY      Left collarbone    TONSILLECTOMY      urinary stricture clearing      VASECTOMY         SOCIAL HISTORY:  Social History     Social History    Marital status:      Spouse name: N/A    Number of children: 2    Years of education: N/A     Occupational History    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[employer]     Social History Main Topics    Smoking status: Former Smoker     Types: Cigarettes     Quit date: 9/20/2011    Smokeless tobacco: Never Used    Alcohol use Yes      Comment: 4-5 beers daily    Drug use: No    Sexual activity: Not on file     Other Topics Concern    Not on file     Social History Narrative    No narrative on file       FAMILY HISTORY:  Family History   Problem Relation Age of Onset    Lung cancer Father     Diabetes Father     Uterine cancer Mother     Heart disease Mother     Drug abuse Brother     Alcohol abuse Brother     Prostate cancer Neg Hx     Colon cancer Neg Hx        ALLERGIES AND MEDICATIONS: updated and reviewed.  Review of patient's allergies indicates:   Allergen Reactions    Ciprofloxacin      Other reaction(s): Muscle pain    Lisinopril Other (See Comments)     cough     Medication List with Changes/Refills   Current Medications    ALPRAZOLAM (XANAX) 0.25 MG TABLET    Take 1 tablet (0.25 mg total) by mouth daily as needed for Anxiety.    AMLODIPINE (NORVASC) 5 MG TABLET    TAKE 1 TABLET(5 MG) BY MOUTH EVERY DAY    CITALOPRAM (CELEXA) 10 MG TABLET    Take 1 tablet (10 mg total) by mouth once daily.    FEXOFENADINE (ALLEGRA) 180 MG TABLET    Take 1 tablet (180 mg total) by mouth once daily.    FLUTICASONE (FLONASE) 50 MCG/ACTUATION NASAL SPRAY    1 spray by Each Nare route once daily.    LEVOCETIRIZINE (XYZAL) 5 MG TABLET    Take 1 tablet (5 mg  total) by mouth every evening.    LORATADINE (CLARITIN) 10 MG TABLET    Take 1 tablet (10 mg total) by mouth once daily.    LOSARTAN (COZAAR) 100 MG TABLET    Take 1 tablet (100 mg total) by mouth once daily.    OMEGA-3 FATTY ACIDS-VITAMIN E (FISH OIL) 1,000 MG CAP    Take 1 capsule by mouth Daily. 1 Capsule Oral Every day    OMEPRAZOLE (PRILOSEC) 20 MG CAPSULE    Take 2 capsules (40 mg total) by mouth once daily.    SILDENAFIL (VIAGRA) 100 MG TABLET    Take 1 tablet (100 mg total) by mouth daily as needed for Erectile Dysfunction.   Discontinued Medications    DIPHENHYDRAMINE (SOMINEX) 25 MG TABLET    Take 25 mg by mouth nightly as needed for Insomnia.            REVIEW OF SYSTEMS:  General ROS: negative for - chills, fever or malaise  Psychological ROS: negative for - memory difficulties, obsessive thoughts or suicidal ideation  Ophthalmic ROS: negative for - blurry vision or eye pain  ENT ROS: negative for - epistaxis, headaches, oral lesions or sore throat  Allergy and Immunology ROS: negative for - hives  Hematological and Lymphatic ROS: negative for - swollen lymph nodes  Endocrine ROS: negative for - polydipsia/polyuria or temperature intolerance  Respiratory ROS: negative for - hemoptysis, sputum changes or wheezing  Cardiovascular ROS: negative for - chest pain or palpitations  Gastrointestinal ROS: negative for - appetite loss, blood in stools, hematemesis, melena or nausea/vomiting  Dermatological ROS: negative for mole changes and rash  Musculoskeletal ROS: negative for - gait disturbance, joint swelling or muscle pain  Neurological ROS: no TIA or stroke symptoms  Genito-Urinary ROS: no dysuria, trouble voiding, or hematuria        PHYSICAL EXAM:  Vitals:    07/05/17 1010   BP: 132/84   Pulse: 92   Temp: 98.6 °F (37 °C)     Weight: 90.9 kg (200 lb 6.4 oz)   Height: 6' (182.9 cm)   Body mass index is 27.18 kg/m².    Physical Examination: General appearance - alert, well appearing, and in no distress and  overweight  Mental status - alert, oriented to person, place, and time, normal mood, behavior, speech, dress, motor activity, and thought processes  Eyes - pupils equal and reactive, extraocular eye movements intact, sclera anicteric  Mouth - mucous membranes moist, pharynx normal without lesions  Chest - clear to auscultation, no wheezes, rales or rhonchi, symmetric air entry  Heart - normal rate, regular rhythm, normal S1, S2, no murmurs, rubs, clicks or gallops  Abdomen - soft, nontender, nondistended, no masses or organomegaly  Neurological - alert, oriented, normal speech, no focal findings or movement disorder noted, cranial nerves II through XII intact  Musculoskeletal - no joint tenderness, deformity or swelling, no muscular tenderness noted  Extremities - peripheral pulses normal, no pedal edema, no clubbing or cyanosis  Skin - normal coloration and turgor, no rashes, no suspicious skin lesions noted       ASSESSMENT AND PLAN:  1. Essential hypertension  Discussed sodium restriction, maintaining ideal body weight and regular exercise program as physiologic means to achieve blood pressure control. The patient will strive towards this. The current medical regimen is effective;  continue present plan and medications. Recommended patient to check home readings to monitor and see me for followup as scheduled or sooner as needed. Patient was educated that both decongestant and anti-inflammatory medication may raise blood pressure.     2. Prediabetes  Stable. We discussed low sugar/low carbohydrate diet and regular exercise to prevent progression. No need for prescription medication at this time.     3. Gastroesophageal reflux disease, esophagitis presence not specified  Symptoms controlled. Reflux precautions discussed (eliminate tobacco if a smoker; minimize caffeine, chocolate and red/white peppermint intake; avoid heavy and spicy meals; don't lay down within 2-3 hours after eating). Medication as needed.  Patient asked to take medication breaks, if possible - discussed chronic use can limit calcium absorption, increases the risk for intestinal infections, and can cause kidney damage.      4. Non-seasonal allergic rhinitis, unspecified allergic rhinitis trigger  The current medical regimen is effective;  continue present plan and medications.     5. Cough  Overall much improved.  Observe.  No need for further evaluation at this time.    6. Mild anxiety  Doing better on Celexa.  Continue current regimen.    7. Elevated LFTs  Normalized off alcohol.  Observe.  Followed by GI.    8. Overweight (BMI 25.0-29.9)  The patient is asked to make an attempt to improve diet and exercise patterns to aid in medical management of this problem.     9. Need for Streptococcus pneumoniae vaccination    - Pneumococcal Conjugate Vaccine (13 Valent) (IM)          Return in about 6 months (around 1/5/2018), or if symptoms worsen or fail to improve, for follow up chronic medical conditions.. or sooner as needed.

## 2017-10-11 DIAGNOSIS — I10 ESSENTIAL HYPERTENSION: ICD-10-CM

## 2017-10-11 DIAGNOSIS — F43.9 STRESS: ICD-10-CM

## 2017-10-11 RX ORDER — CITALOPRAM 10 MG/1
10 TABLET ORAL DAILY
Qty: 90 TABLET | Refills: 0 | Status: SHIPPED | OUTPATIENT
Start: 2017-10-11 | End: 2018-01-18 | Stop reason: SDUPTHER

## 2017-10-11 RX ORDER — AMLODIPINE BESYLATE 5 MG/1
TABLET ORAL
Qty: 90 TABLET | Refills: 0 | Status: SHIPPED | OUTPATIENT
Start: 2017-10-11 | End: 2018-01-18 | Stop reason: SDUPTHER

## 2017-10-11 RX ORDER — LOSARTAN POTASSIUM 100 MG/1
100 TABLET ORAL DAILY
Qty: 90 TABLET | Refills: 0 | Status: SHIPPED | OUTPATIENT
Start: 2017-10-11 | End: 2018-01-18 | Stop reason: SDUPTHER

## 2018-01-08 DIAGNOSIS — Z13.6 SCREENING FOR AAA (AORTIC ABDOMINAL ANEURYSM): ICD-10-CM

## 2018-01-18 DIAGNOSIS — I10 ESSENTIAL HYPERTENSION: ICD-10-CM

## 2018-01-18 DIAGNOSIS — J30.89 CHRONIC NON-SEASONAL ALLERGIC RHINITIS, UNSPECIFIED TRIGGER: ICD-10-CM

## 2018-01-18 DIAGNOSIS — R73.03 PREDIABETES: Primary | ICD-10-CM

## 2018-01-18 DIAGNOSIS — J30.89 NON-SEASONAL ALLERGIC RHINITIS, UNSPECIFIED ALLERGIC RHINITIS TRIGGER: ICD-10-CM

## 2018-01-18 DIAGNOSIS — F43.9 STRESS: ICD-10-CM

## 2018-01-18 RX ORDER — AMLODIPINE BESYLATE 5 MG/1
TABLET ORAL
Qty: 90 TABLET | Refills: 0 | Status: SHIPPED | OUTPATIENT
Start: 2018-01-18 | End: 2018-03-16 | Stop reason: SDUPTHER

## 2018-01-18 RX ORDER — METRONIDAZOLE 10 MG/G
1 GEL TOPICAL DAILY
Refills: 1 | COMMUNITY
Start: 2017-12-20 | End: 2021-10-22

## 2018-01-18 RX ORDER — LOSARTAN POTASSIUM 100 MG/1
100 TABLET ORAL DAILY
Qty: 90 TABLET | Refills: 0 | Status: SHIPPED | OUTPATIENT
Start: 2018-01-18 | End: 2018-03-16 | Stop reason: SDUPTHER

## 2018-01-18 RX ORDER — FLUTICASONE PROPIONATE 50 MCG
1 SPRAY, SUSPENSION (ML) NASAL DAILY
Qty: 48 G | Refills: 1 | Status: SHIPPED | OUTPATIENT
Start: 2018-01-18 | End: 2018-04-20 | Stop reason: SDUPTHER

## 2018-01-18 RX ORDER — DOXYCYCLINE 100 MG/1
CAPSULE ORAL
Refills: 0 | COMMUNITY
Start: 2017-12-20 | End: 2018-01-20

## 2018-01-18 RX ORDER — CITALOPRAM 10 MG/1
10 TABLET ORAL DAILY
Qty: 90 TABLET | Refills: 0 | Status: SHIPPED | OUTPATIENT
Start: 2018-01-18 | End: 2018-04-20 | Stop reason: SDUPTHER

## 2018-01-18 NOTE — TELEPHONE ENCOUNTER
Refill request. OV 2/7/18, requesting lab orders. Please review. Patient states he will get his flu vaccine at visit.

## 2018-02-05 ENCOUNTER — LAB VISIT (OUTPATIENT)
Dept: LAB | Facility: HOSPITAL | Age: 68
End: 2018-02-05
Attending: INTERNAL MEDICINE
Payer: COMMERCIAL

## 2018-02-05 DIAGNOSIS — I10 ESSENTIAL HYPERTENSION: ICD-10-CM

## 2018-02-05 DIAGNOSIS — R73.03 PREDIABETES: ICD-10-CM

## 2018-02-05 LAB
ALBUMIN SERPL BCP-MCNC: 3.5 G/DL
ALP SERPL-CCNC: 95 U/L
ALT SERPL W/O P-5'-P-CCNC: 42 U/L
ANION GAP SERPL CALC-SCNC: 9 MMOL/L
AST SERPL-CCNC: 34 U/L
BASOPHILS # BLD AUTO: 0.04 K/UL
BASOPHILS NFR BLD: 0.7 %
BILIRUB SERPL-MCNC: 0.7 MG/DL
BUN SERPL-MCNC: 13 MG/DL
CALCIUM SERPL-MCNC: 9.4 MG/DL
CHLORIDE SERPL-SCNC: 104 MMOL/L
CHOLEST SERPL-MCNC: 186 MG/DL
CHOLEST/HDLC SERPL: 3.2 {RATIO}
CO2 SERPL-SCNC: 26 MMOL/L
CREAT SERPL-MCNC: 0.9 MG/DL
DIFFERENTIAL METHOD: ABNORMAL
EOSINOPHIL # BLD AUTO: 0.3 K/UL
EOSINOPHIL NFR BLD: 5 %
ERYTHROCYTE [DISTWIDTH] IN BLOOD BY AUTOMATED COUNT: 13 %
EST. GFR  (AFRICAN AMERICAN): >60 ML/MIN/1.73 M^2
EST. GFR  (NON AFRICAN AMERICAN): >60 ML/MIN/1.73 M^2
ESTIMATED AVG GLUCOSE: 103 MG/DL
GLUCOSE SERPL-MCNC: 108 MG/DL
HBA1C MFR BLD HPLC: 5.2 %
HCT VFR BLD AUTO: 41.7 %
HDLC SERPL-MCNC: 58 MG/DL
HDLC SERPL: 31.2 %
HGB BLD-MCNC: 13.6 G/DL
IMM GRANULOCYTES # BLD AUTO: 0.02 K/UL
IMM GRANULOCYTES NFR BLD AUTO: 0.3 %
LDLC SERPL CALC-MCNC: 116 MG/DL
LYMPHOCYTES # BLD AUTO: 1.7 K/UL
LYMPHOCYTES NFR BLD: 28.4 %
MCH RBC QN AUTO: 32.2 PG
MCHC RBC AUTO-ENTMCNC: 32.6 G/DL
MCV RBC AUTO: 99 FL
MONOCYTES # BLD AUTO: 0.6 K/UL
MONOCYTES NFR BLD: 9.6 %
NEUTROPHILS # BLD AUTO: 3.3 K/UL
NEUTROPHILS NFR BLD: 56 %
NONHDLC SERPL-MCNC: 128 MG/DL
NRBC BLD-RTO: 0 /100 WBC
PLATELET # BLD AUTO: 324 K/UL
PMV BLD AUTO: 8.9 FL
POTASSIUM SERPL-SCNC: 4.5 MMOL/L
PROT SERPL-MCNC: 7.4 G/DL
RBC # BLD AUTO: 4.22 M/UL
SODIUM SERPL-SCNC: 139 MMOL/L
TRIGL SERPL-MCNC: 60 MG/DL
WBC # BLD AUTO: 5.95 K/UL

## 2018-02-05 PROCEDURE — 83036 HEMOGLOBIN GLYCOSYLATED A1C: CPT

## 2018-02-05 PROCEDURE — 80053 COMPREHEN METABOLIC PANEL: CPT

## 2018-02-05 PROCEDURE — 80061 LIPID PANEL: CPT

## 2018-02-05 PROCEDURE — 85025 COMPLETE CBC W/AUTO DIFF WBC: CPT

## 2018-02-05 PROCEDURE — 36415 COLL VENOUS BLD VENIPUNCTURE: CPT | Mod: PO

## 2018-02-07 ENCOUNTER — OFFICE VISIT (OUTPATIENT)
Dept: FAMILY MEDICINE | Facility: CLINIC | Age: 68
End: 2018-02-07
Payer: COMMERCIAL

## 2018-02-07 VITALS
OXYGEN SATURATION: 95 % | WEIGHT: 195.44 LBS | DIASTOLIC BLOOD PRESSURE: 66 MMHG | SYSTOLIC BLOOD PRESSURE: 116 MMHG | BODY MASS INDEX: 26.47 KG/M2 | HEIGHT: 72 IN | HEART RATE: 80 BPM | TEMPERATURE: 99 F

## 2018-02-07 DIAGNOSIS — J30.89 NON-SEASONAL ALLERGIC RHINITIS, UNSPECIFIED CHRONICITY, UNSPECIFIED TRIGGER: ICD-10-CM

## 2018-02-07 DIAGNOSIS — K21.9 GASTROESOPHAGEAL REFLUX DISEASE, ESOPHAGITIS PRESENCE NOT SPECIFIED: Primary | ICD-10-CM

## 2018-02-07 DIAGNOSIS — M25.562 CHRONIC PAIN OF BOTH KNEES: ICD-10-CM

## 2018-02-07 DIAGNOSIS — E66.3 OVERWEIGHT (BMI 25.0-29.9): ICD-10-CM

## 2018-02-07 DIAGNOSIS — Q64.32 CONGENITAL STRICTURE OF URETHRA: ICD-10-CM

## 2018-02-07 DIAGNOSIS — G89.29 CHRONIC PAIN OF BOTH KNEES: ICD-10-CM

## 2018-02-07 DIAGNOSIS — M25.561 CHRONIC PAIN OF BOTH KNEES: ICD-10-CM

## 2018-02-07 DIAGNOSIS — I10 ESSENTIAL HYPERTENSION: ICD-10-CM

## 2018-02-07 DIAGNOSIS — F41.9 MILD ANXIETY: ICD-10-CM

## 2018-02-07 DIAGNOSIS — R73.03 PREDIABETES: ICD-10-CM

## 2018-02-07 PROCEDURE — 99214 OFFICE O/P EST MOD 30 MIN: CPT | Mod: S$GLB,,, | Performed by: NURSE PRACTITIONER

## 2018-02-07 PROCEDURE — 99999 PR PBB SHADOW E&M-EST. PATIENT-LVL IV: CPT | Mod: PBBFAC,,, | Performed by: NURSE PRACTITIONER

## 2018-02-07 PROCEDURE — 3008F BODY MASS INDEX DOCD: CPT | Mod: S$GLB,,, | Performed by: NURSE PRACTITIONER

## 2018-02-07 NOTE — PROGRESS NOTES
Subjective:       Patient ID: Vinnie Scott is a 67 y.o. male.    Chief Complaint: Hypertension (F/U)    67-year-old male presents to the clinic today for follow-up on hypertension.  His blood pressure today is 116/66.  He states his blood pressure this morning was 138/65.  He states his home blood pressures run in the 130s over 60s.  He has good dietary habits.  He does not exercise however he is very active at work.  He is compliant with all of his medications.  He denies any headaches, dizziness, or blurred vision.  He denies any cardiac chest pain, heart palpitations, shortness breath, or swelling to lower extremities.  He received a flu shot through his work.  He is due for an abdominal aortic screening. I did discuss his lab results with him.       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    Mild anxiety     Overweight(278.02)     Personal history of colonic polyps October 2010, May 2015    Prediabetes     Urinary anastomotic stricture      Past Surgical History:   Procedure Laterality Date    ADENOIDECTOMY      COLONOSCOPY N/A 5/31/2017    Procedure: COLONOSCOPY;  Surgeon: Fatoumata Cook MD;  Location: [facility];  Service: Endoscopy;  Laterality: N/A;    FRACTURE SURGERY      Left collarbone    TONSILLECTOMY      urinary stricture clearing      VASECTOMY        reports that he quit smoking about 6 years ago. His smoking use included Cigarettes. He has never used smokeless tobacco. He reports that he drinks alcohol. He reports that he does not use drugs.  Review of Systems   Respiratory: Negative for cough, shortness of breath and wheezing.     Cardiovascular: Negative for chest pain, palpitations and leg swelling.   Gastrointestinal: Negative for abdominal pain, blood in stool, constipation, diarrhea, nausea and vomiting.   Musculoskeletal: Negative for gait problem.   Skin: Negative for rash.   Neurological: Negative for dizziness, light-headedness and headaches.       Objective:      Physical Exam   Constitutional: He is oriented to person, place, and time. He appears well-developed and well-nourished. No distress.   Eyes: Conjunctivae and EOM are normal. Pupils are equal, round, and reactive to light. Right eye exhibits no discharge. Left eye exhibits no discharge. No scleral icterus.   Neck: Normal range of motion. Neck supple. No JVD present.   Cardiovascular: Normal rate, regular rhythm and normal heart sounds.    No murmur heard.  Pulmonary/Chest: Effort normal and breath sounds normal. No respiratory distress. He has no wheezes. He has no rales.   Abdominal: Soft. Bowel sounds are normal. There is no tenderness.   Musculoskeletal: Normal range of motion. He exhibits no edema.   Neurological: He is alert and oriented to person, place, and time.   Skin: Skin is warm and dry. He is not diaphoretic.   Psychiatric: He has a normal mood and affect.       Assessment:       1. Gastroesophageal reflux disease, esophagitis presence not specified    2. Non-seasonal allergic rhinitis, unspecified chronicity, unspecified trigger    3. Chronic pain of both knees    4. Overweight (BMI 25.0-29.9)    5. Congenital stricture of urethra    6. Essential hypertension    7. Prediabetes    8. Mild anxiety        Plan:         Gastroesophageal reflux disease, esophagitis presence not specified  - uses medication as needed only    Non-seasonal allergic rhinitis, unspecified chronicity, unspecified trigger  - The current medical regimen is effective;  continue present plan and medications.    Chronic pain of both knees  - does not take anything for chronic knee  pain    Overweight (BMI 25.0-29.9)  - The patient is asked to make an attempt to improve diet and exercise patterns to aid in medical management of this problem.    Congenital stricture of urethra  - self caths  - followed by Dr. Culp    Essential hypertension  - The current medical regimen is effective;  continue present plan and medications.    Prediabetes  - avoid excessive sugars and carbohydrates in diet    Mild anxiety  - The current medical regimen is effective;  continue present plan and medications.

## 2018-03-16 DIAGNOSIS — I10 ESSENTIAL HYPERTENSION: ICD-10-CM

## 2018-03-16 RX ORDER — LOSARTAN POTASSIUM 100 MG/1
100 TABLET ORAL DAILY
Qty: 90 TABLET | Refills: 0 | Status: SHIPPED | OUTPATIENT
Start: 2018-03-16 | End: 2018-04-20 | Stop reason: SDUPTHER

## 2018-03-16 RX ORDER — AMLODIPINE BESYLATE 5 MG/1
TABLET ORAL
Qty: 90 TABLET | Refills: 0 | Status: SHIPPED | OUTPATIENT
Start: 2018-03-16 | End: 2018-04-20 | Stop reason: SDUPTHER

## 2018-04-20 DIAGNOSIS — F43.9 STRESS: ICD-10-CM

## 2018-04-20 DIAGNOSIS — I10 ESSENTIAL HYPERTENSION: ICD-10-CM

## 2018-04-20 DIAGNOSIS — J30.89 CHRONIC NON-SEASONAL ALLERGIC RHINITIS, UNSPECIFIED TRIGGER: ICD-10-CM

## 2018-04-20 RX ORDER — CITALOPRAM 10 MG/1
10 TABLET ORAL DAILY
Qty: 90 TABLET | Refills: 0 | Status: SHIPPED | OUTPATIENT
Start: 2018-04-20 | End: 2018-07-31 | Stop reason: SDUPTHER

## 2018-04-20 RX ORDER — LOSARTAN POTASSIUM 100 MG/1
100 TABLET ORAL DAILY
Qty: 90 TABLET | Refills: 0 | Status: SHIPPED | OUTPATIENT
Start: 2018-04-20 | End: 2018-07-31 | Stop reason: SDUPTHER

## 2018-04-20 RX ORDER — FLUTICASONE PROPIONATE 50 MCG
1 SPRAY, SUSPENSION (ML) NASAL DAILY
Qty: 48 G | Refills: 1 | Status: SHIPPED | OUTPATIENT
Start: 2018-04-20 | End: 2018-07-31 | Stop reason: SDUPTHER

## 2018-04-20 RX ORDER — AMLODIPINE BESYLATE 5 MG/1
TABLET ORAL
Qty: 90 TABLET | Refills: 0 | Status: SHIPPED | OUTPATIENT
Start: 2018-04-20 | End: 2018-07-31 | Stop reason: SDUPTHER

## 2018-04-20 NOTE — TELEPHONE ENCOUNTER
I sent the Rx to the pharmacy.  Please have patient come in to start hepatitis B immunization series.

## 2018-04-24 NOTE — TELEPHONE ENCOUNTER
Spoke w/patient, states he received all three vaccines with  at [place] when his liver enzymes were elevated.

## 2018-07-31 DIAGNOSIS — F43.9 STRESS: ICD-10-CM

## 2018-07-31 DIAGNOSIS — I10 ESSENTIAL HYPERTENSION: ICD-10-CM

## 2018-07-31 RX ORDER — LOSARTAN POTASSIUM 100 MG/1
100 TABLET ORAL DAILY
Qty: 90 TABLET | Refills: 0 | Status: SHIPPED | OUTPATIENT
Start: 2018-07-31 | End: 2018-11-23 | Stop reason: SDUPTHER

## 2018-07-31 RX ORDER — CITALOPRAM 10 MG/1
10 TABLET ORAL DAILY
Qty: 90 TABLET | Refills: 0 | Status: SHIPPED | OUTPATIENT
Start: 2018-07-31 | End: 2018-11-23 | Stop reason: SDUPTHER

## 2018-07-31 RX ORDER — AMLODIPINE BESYLATE 5 MG/1
TABLET ORAL
Qty: 90 TABLET | Refills: 0 | Status: SHIPPED | OUTPATIENT
Start: 2018-07-31 | End: 2018-11-23 | Stop reason: SDUPTHER

## 2018-07-31 RX ORDER — FLUTICASONE PROPIONATE 50 MCG
1 SPRAY, SUSPENSION (ML) NASAL DAILY
Qty: 48 G | Refills: 1 | Status: SHIPPED | OUTPATIENT
Start: 2018-07-31 | End: 2019-02-25 | Stop reason: SDUPTHER

## 2018-11-23 DIAGNOSIS — I10 ESSENTIAL HYPERTENSION: ICD-10-CM

## 2018-11-23 DIAGNOSIS — F43.9 STRESS: ICD-10-CM

## 2018-11-23 RX ORDER — AMLODIPINE BESYLATE 5 MG/1
TABLET ORAL
Qty: 90 TABLET | Refills: 0 | Status: SHIPPED | OUTPATIENT
Start: 2018-11-23 | End: 2019-02-25 | Stop reason: SDUPTHER

## 2018-11-23 RX ORDER — LOSARTAN POTASSIUM 100 MG/1
100 TABLET ORAL DAILY
Qty: 90 TABLET | Refills: 0 | Status: SHIPPED | OUTPATIENT
Start: 2018-11-23 | End: 2019-02-25 | Stop reason: SDUPTHER

## 2018-11-23 RX ORDER — CITALOPRAM 10 MG/1
10 TABLET ORAL DAILY
Qty: 90 TABLET | Refills: 0 | Status: SHIPPED | OUTPATIENT
Start: 2018-11-23 | End: 2019-02-25 | Stop reason: SDUPTHER

## 2018-12-08 ENCOUNTER — HOSPITAL ENCOUNTER (EMERGENCY)
Facility: HOSPITAL | Age: 68
Discharge: HOME | End: 2018-12-08
Attending: EMERGENCY MEDICINE
Payer: COMMERCIAL

## 2018-12-08 VITALS
RESPIRATION RATE: 20 BRPM | SYSTOLIC BLOOD PRESSURE: 136 MMHG | HEART RATE: 130 BPM | BODY MASS INDEX: 26.41 KG/M2 | HEIGHT: 72 IN | WEIGHT: 195 LBS | DIASTOLIC BLOOD PRESSURE: 74 MMHG | OXYGEN SATURATION: 97 % | TEMPERATURE: 99 F

## 2018-12-08 DIAGNOSIS — M25.572 LEFT ANKLE PAIN: ICD-10-CM

## 2018-12-08 DIAGNOSIS — S93.402A LEFT ANKLE SPRAIN: ICD-10-CM

## 2018-12-08 DIAGNOSIS — S82.402A CLOSED LEFT FIBULAR FRACTURE: Primary | ICD-10-CM

## 2018-12-08 LAB
ALBUMIN SERPL-MCNC: 3.9 G/DL (ref 3.3–5.5)
ALP SERPL-CCNC: 98 U/L (ref 42–141)
BILIRUB SERPL-MCNC: 0.8 MG/DL (ref 0.2–1.6)
BUN SERPL-MCNC: 19 MG/DL (ref 7–22)
CALCIUM SERPL-MCNC: 9.5 MG/DL (ref 8–10.3)
CHLORIDE SERPL-SCNC: 108 MMOL/L (ref 98–108)
CREAT SERPL-MCNC: 1.3 MG/DL (ref 0.6–1.2)
GLUCOSE SERPL-MCNC: 90 MG/DL (ref 73–118)
POC ALT (SGPT): 48 U/L (ref 10–47)
POC AST (SGOT): 54 U/L (ref 11–38)
POC TCO2: 20 MMOL/L (ref 18–33)
POTASSIUM BLD-SCNC: 4.2 MMOL/L (ref 3.6–5.1)
PROTEIN, POC: 7.6 G/DL (ref 6.4–8.1)
SODIUM BLD-SCNC: 141 MMOL/L (ref 128–145)

## 2018-12-08 PROCEDURE — 99284 EMERGENCY DEPT VISIT MOD MDM: CPT | Mod: 25

## 2018-12-08 PROCEDURE — 93005 ELECTROCARDIOGRAM TRACING: CPT

## 2018-12-08 PROCEDURE — 80053 COMPREHEN METABOLIC PANEL: CPT

## 2018-12-08 PROCEDURE — 25000003 PHARM REV CODE 250: Performed by: EMERGENCY MEDICINE

## 2018-12-08 PROCEDURE — 29515 APPLICATION SHORT LEG SPLINT: CPT | Mod: LT

## 2018-12-08 PROCEDURE — 85025 COMPLETE CBC W/AUTO DIFF WBC: CPT

## 2018-12-08 PROCEDURE — 25000003 PHARM REV CODE 250: Performed by: NURSE PRACTITIONER

## 2018-12-08 PROCEDURE — 81003 URINALYSIS AUTO W/O SCOPE: CPT

## 2018-12-08 PROCEDURE — 93010 ELECTROCARDIOGRAM REPORT: CPT | Mod: ,,, | Performed by: INTERNAL MEDICINE

## 2018-12-08 RX ORDER — OXYCODONE AND ACETAMINOPHEN 5; 325 MG/1; MG/1
1 TABLET ORAL EVERY 8 HOURS PRN
Qty: 15 TABLET | Refills: 0 | Status: SHIPPED | OUTPATIENT
Start: 2018-12-08 | End: 2019-12-01

## 2018-12-08 RX ORDER — IBUPROFEN 600 MG/1
600 TABLET ORAL
Status: COMPLETED | OUTPATIENT
Start: 2018-12-08 | End: 2018-12-08

## 2018-12-08 RX ORDER — OXYCODONE AND ACETAMINOPHEN 5; 325 MG/1; MG/1
1 TABLET ORAL
Status: COMPLETED | OUTPATIENT
Start: 2018-12-08 | End: 2018-12-08

## 2018-12-08 RX ADMIN — OXYCODONE AND ACETAMINOPHEN 1 TABLET: 5; 325 TABLET ORAL at 03:12

## 2018-12-08 RX ADMIN — IBUPROFEN 600 MG: 600 TABLET ORAL at 02:12

## 2018-12-08 NOTE — ED PROVIDER NOTES
Encounter Date: 12/8/2018       History     Chief Complaint   Patient presents with    Leg Pain     left lower leg and ankle pain, trip over dog at home, fall onto grass, no LOC, swelling noted to left ankle. occurred yesterday.       The history is provided by the patient. No  was used.   Foot Injury    The incident occurred at home. Injury mechanism: Patient tripped over his dog. The incident occurred yesterday. Pain location: Left ankle. The quality of the pain is described as aching and throbbing. The pain is at a severity of 7/10. The pain has been constant since onset. Associated symptoms include inability to bear weight. Pertinent negatives include no numbness, no loss of motion, no muscle weakness, no loss of sensation and no tingling. He reports no foreign bodies present. The symptoms are aggravated by activity, bearing weight and palpation. He has tried nothing for the symptoms.     Review of patient's allergies indicates:   Allergen Reactions    Ciprofloxacin      Other reaction(s): Muscle pain    Lisinopril Other (See Comments)     cough     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    Mild anxiety     Overweight(278.02)     Personal history of colonic polyps October 2010, May 2015    Prediabetes     Urinary anastomotic stricture      Past Surgical History:   Procedure Laterality Date    ADENOIDECTOMY      COLONOSCOPY N/A 5/31/2017    Procedure: COLONOSCOPY;  Surgeon: Fatoumata Cook MD;  Location: [facility];  Service: Endoscopy;  Laterality: N/A;    COLONOSCOPY N/A 5/31/2017    [facility]formed [facility] at [facility] ENDO     COLONOSCOPY N/A 2015    [facility]    COLONOSCOPY N/A 2013    Performed by Maciel Patton MD at [facility] ENDO    FRACTURE SURGERY      Left collarbone    TONSILLECTOMY      urinary stricture clearing      VASECTOMY       Family History   Problem Relation Age of Onset    Lung cancer Father     Diabetes Father     Uterine cancer Mother     Heart disease Mother     Drug abuse Brother     Alcohol abuse Brother     Prostate cancer Neg Hx     Colon cancer Neg Hx      Social History     Tobacco Use    Smoking status: Former Smoker     Types: Cigarettes     Last attempt to quit: 2011     Years since quittin.2    Smokeless tobacco: Never Used   Substance Use Topics    Alcohol use: Yes     Comment: 4-5 beers daily    Drug use: No     Review of Systems   Constitutional: Negative.  Negative for activity change, chills, diaphoresis and fever.   HENT: Negative.  Negative for congestion, nosebleeds, rhinorrhea, sinus pressure, sinus pain, sore throat and trouble swallowing.    Eyes: Negative.  Negative for pain, discharge, redness and itching.   Respiratory: Negative.  Negative for cough, chest tightness, shortness of breath, wheezing and stridor.    Cardiovascular: Negative.  Negative for chest pain, palpitations and leg swelling.   Gastrointestinal: Negative.  Negative for abdominal pain, constipation, diarrhea, nausea and vomiting.   Endocrine: Negative.  Negative for cold intolerance, heat intolerance, polydipsia, polyphagia and polyuria.   Genitourinary: Negative.  Negative for difficulty urinating, discharge, dysuria, frequency, penile pain, scrotal swelling and testicular pain.   Musculoskeletal: Negative for back pain and neck pain.        Left ankle swelling and pain   Skin: Negative.  Negative for color change and wound.   Allergic/Immunologic: Negative.    Neurological: Negative.  Negative for dizziness, tingling, tremors, seizures, weakness,  light-headedness, numbness and headaches.   Hematological: Negative.    Psychiatric/Behavioral: Negative.  Negative for agitation, confusion, hallucinations and suicidal ideas. The patient is not nervous/anxious.    All other systems reviewed and are negative.      Physical Exam     Initial Vitals [12/08/18 1415]   BP Pulse Resp Temp SpO2   136/74 (!) 130 20 98.8 °F (37.1 °C) 97 %      MAP       --         Physical Exam    Nursing note and vitals reviewed.  Constitutional: He appears well-developed and well-nourished. He is not diaphoretic.  Non-toxic appearance. He does not appear ill. No distress.   HENT:   Head: Normocephalic and atraumatic.   Mouth/Throat: Oropharynx is clear and moist. No oropharyngeal exudate.   Eyes: Conjunctivae and EOM are normal. Pupils are equal, round, and reactive to light.   Neck: Normal range of motion. Neck supple.   Cardiovascular: Normal rate, regular rhythm, normal heart sounds and intact distal pulses. Exam reveals no gallop and no friction rub.    No murmur heard.  Pulmonary/Chest: Breath sounds normal. No respiratory distress. He has no wheezes. He has no rhonchi. He has no rales. He exhibits no tenderness.   Abdominal: Soft. Bowel sounds are normal. He exhibits no distension and no mass. There is no tenderness. There is no rebound and no guarding.   Musculoskeletal:        Left ankle: He exhibits decreased range of motion, swelling, ecchymosis and deformity. He exhibits no laceration and normal pulse. Tenderness. Lateral malleolus tenderness found. Achilles tendon normal. Achilles tendon exhibits no pain, no defect and normal Lees's test results.   Neurological: He is alert and oriented to person, place, and time.   Skin: Skin is warm and dry. Capillary refill takes less than 2 seconds. No rash noted.   Psychiatric: He has a normal mood and affect. His behavior is normal. Judgment and thought content normal.         ED Course   Splint Application  Date/Time: 12/8/2018 3:54  PM  Performed by: Toussaint Battley III, FNFRANKIE  Authorized by: Justine Haas MD   Consent Done: Emergent Situation  Location details: left ankle  Splint type: ankle stirrup  Supplies used: cotton padding and Ortho-Glass  Post-procedure: The splinted body part was neurovascularly unchanged following the procedure.  Patient tolerance: Patient tolerated the procedure well with no immediate complications        Labs Reviewed   POCT CBC   POCT URINALYSIS W/O SCOPE   POCT CMP          Imaging Results          X-Ray Tibia Fibula 2 View Left (Final result)  Result time 12/08/18 15:16:30    Final result by Dagoberto Argueta MD (12/08/18 15:16:30)                 Impression:      As above      Electronically signed by: Dagoberto Argueta MD  Date:    12/08/2018  Time:    15:16             Narrative:    EXAMINATION:  XR TIBIA FIBULA 2 VIEW LEFT    CLINICAL HISTORY:  Pain in left ankle and joints of left foot    TECHNIQUE:  AP and lateral views of the left tibia and fibula were performed.    COMPARISON:  None.    FINDINGS:  A comminuted distal left fibular shaft fracture is seen.  This is discussed on the ankle radiographs from the same day.  There is soft tissue swelling about the lateral malleolus.  The proximal fibula and the left tibia appear intact.  Extensive vascular calcification is noted.                               X-Ray Ankle Complete Left (Final result)  Result time 12/08/18 15:15:14    Final result by Dagoberto Argueta MD (12/08/18 15:15:14)                 Impression:      As above      Electronically signed by: Dagoberto Argueta MD  Date:    12/08/2018  Time:    15:15             Narrative:    EXAMINATION:  XR ANKLE COMPLETE 3 VIEW LEFT    CLINICAL HISTORY:  Sprain of unspecified ligament of left ankle, initial encounter    TECHNIQUE:  AP, lateral and oblique views of the left ankle were performed.    COMPARISON:  None    FINDINGS:  There is a mildly comminuted fracture through the distal left fibular shaft extending into  the lateral malleolus.  Minimal displacement of fracture fragments is seen.  There is surrounding soft tissue swelling.  Ankle mortise is congruent.  Extensive vascular calcification is noted.  Achilles and plantar enthesophytes.  Degenerative changes at the talonavicular joint.                                 Medical Decision Making:   Initial Assessment:   Left fibular comminuted closed fracture  Differential Diagnosis:   Contusion, sprain  Clinical Tests:   Lab Tests: Ordered  Radiological Study: Ordered and Reviewed  Medical Tests: Ordered  ED Management:  Motrin and Percocet p.o. given in the ER.  Splint applied in the ER.  Patient will be discharged home on crutches and with Percocet per Dr. Dolan, Orthopedic, recs with f/u appt at 1 pm in his office on 12/10/2018. Pt is instructed to implement RICE and return to the ER as needed if symptoms worsen or fail to improve.  Patient verbalized understanding of discharge instructions and treatment plan.  Other:   I have discussed this case with another health care provider.       <> Summary of the Discussion: Spoke with , Ortho-on-call, stated that acute intervention at this very moment is not warranted however the patient will need to be seen urgently in his clinic on this coming Monday December 10, 2018 at 1:00 p.m. for follow-up as he likely will need surgery for correction of this type of fracture.                      Clinical Impression:   Diagnoses of Left ankle sprain, Left ankle pain, and Closed left fibular fracture were pertinent to this visit.                             Toussaint Battley III, JOHNY  12/08/18 1557

## 2019-02-25 DIAGNOSIS — I10 ESSENTIAL HYPERTENSION: ICD-10-CM

## 2019-02-25 DIAGNOSIS — F43.9 STRESS: ICD-10-CM

## 2019-02-25 RX ORDER — AMLODIPINE BESYLATE 5 MG/1
TABLET ORAL
Qty: 90 TABLET | Refills: 0 | Status: SHIPPED | OUTPATIENT
Start: 2019-02-25 | End: 2019-06-03 | Stop reason: SDUPTHER

## 2019-02-25 RX ORDER — FLUTICASONE PROPIONATE 50 MCG
1 SPRAY, SUSPENSION (ML) NASAL DAILY
Qty: 48 G | Refills: 1 | Status: SHIPPED | OUTPATIENT
Start: 2019-02-25 | End: 2020-06-03 | Stop reason: SDUPTHER

## 2019-02-25 RX ORDER — LOSARTAN POTASSIUM 100 MG/1
100 TABLET ORAL DAILY
Qty: 90 TABLET | Refills: 0 | Status: SHIPPED | OUTPATIENT
Start: 2019-02-25 | End: 2019-06-03 | Stop reason: SDUPTHER

## 2019-02-25 RX ORDER — CITALOPRAM 10 MG/1
10 TABLET ORAL DAILY
Qty: 90 TABLET | Refills: 0 | Status: SHIPPED | OUTPATIENT
Start: 2019-02-25 | End: 2019-06-03 | Stop reason: SDUPTHER

## 2019-06-03 DIAGNOSIS — F43.9 STRESS: ICD-10-CM

## 2019-06-03 DIAGNOSIS — I10 ESSENTIAL HYPERTENSION: ICD-10-CM

## 2019-06-03 RX ORDER — LOSARTAN POTASSIUM 100 MG/1
100 TABLET ORAL DAILY
Qty: 30 TABLET | Refills: 0 | Status: SHIPPED | OUTPATIENT
Start: 2019-06-03 | End: 2020-01-06 | Stop reason: SDUPTHER

## 2019-06-03 RX ORDER — LOSARTAN POTASSIUM 100 MG/1
100 TABLET ORAL DAILY
Qty: 90 TABLET | Refills: 0 | Status: SHIPPED | OUTPATIENT
Start: 2019-06-03 | End: 2019-09-23 | Stop reason: SDUPTHER

## 2019-06-03 RX ORDER — AMLODIPINE BESYLATE 5 MG/1
TABLET ORAL
Qty: 90 TABLET | Refills: 0 | Status: SHIPPED | OUTPATIENT
Start: 2019-06-03 | End: 2019-09-23 | Stop reason: SDUPTHER

## 2019-06-03 RX ORDER — CITALOPRAM 10 MG/1
10 TABLET ORAL DAILY
Qty: 90 TABLET | Refills: 0 | Status: SHIPPED | OUTPATIENT
Start: 2019-06-03 | End: 2019-09-23 | Stop reason: SDUPTHER

## 2019-06-03 RX ORDER — AMLODIPINE BESYLATE 5 MG/1
TABLET ORAL
Qty: 30 TABLET | Refills: 0 | Status: SHIPPED | OUTPATIENT
Start: 2019-06-03 | End: 2020-01-06 | Stop reason: SDUPTHER

## 2019-06-03 RX ORDER — CITALOPRAM 10 MG/1
10 TABLET ORAL DAILY
Qty: 30 TABLET | Refills: 0 | Status: SHIPPED | OUTPATIENT
Start: 2019-06-03 | End: 2020-01-06 | Stop reason: SDUPTHER

## 2019-09-23 DIAGNOSIS — F43.9 STRESS: ICD-10-CM

## 2019-09-23 DIAGNOSIS — I10 ESSENTIAL HYPERTENSION: ICD-10-CM

## 2019-09-23 RX ORDER — AMLODIPINE BESYLATE 5 MG/1
TABLET ORAL
Qty: 90 TABLET | Refills: 0 | Status: SHIPPED | OUTPATIENT
Start: 2019-09-23 | End: 2019-12-01 | Stop reason: SDUPTHER

## 2019-09-23 RX ORDER — LOSARTAN POTASSIUM 100 MG/1
100 TABLET ORAL DAILY
Qty: 90 TABLET | Refills: 0 | Status: SHIPPED | OUTPATIENT
Start: 2019-09-23 | End: 2019-12-01 | Stop reason: SDUPTHER

## 2019-09-23 RX ORDER — CITALOPRAM 10 MG/1
10 TABLET ORAL DAILY
Qty: 90 TABLET | Refills: 0 | Status: SHIPPED | OUTPATIENT
Start: 2019-09-23 | End: 2019-12-01 | Stop reason: SDUPTHER

## 2019-11-27 ENCOUNTER — PATIENT MESSAGE (OUTPATIENT)
Dept: FAMILY MEDICINE | Facility: CLINIC | Age: 69
End: 2019-11-27

## 2019-11-27 DIAGNOSIS — R73.03 PREDIABETES: Primary | ICD-10-CM

## 2019-11-27 DIAGNOSIS — I10 ESSENTIAL HYPERTENSION: ICD-10-CM

## 2019-11-29 ENCOUNTER — LAB VISIT (OUTPATIENT)
Dept: LAB | Facility: HOSPITAL | Age: 69
End: 2019-11-29
Attending: INTERNAL MEDICINE
Payer: COMMERCIAL

## 2019-11-29 DIAGNOSIS — R73.03 PREDIABETES: ICD-10-CM

## 2019-11-29 DIAGNOSIS — I10 ESSENTIAL HYPERTENSION: ICD-10-CM

## 2019-11-29 LAB
ALBUMIN SERPL BCP-MCNC: 3.6 G/DL (ref 3.5–5.2)
ALP SERPL-CCNC: 70 U/L (ref 55–135)
ALT SERPL W/O P-5'-P-CCNC: 14 U/L (ref 10–44)
ANION GAP SERPL CALC-SCNC: 8 MMOL/L (ref 8–16)
AST SERPL-CCNC: 14 U/L (ref 10–40)
BASOPHILS # BLD AUTO: 0.03 K/UL (ref 0–0.2)
BASOPHILS NFR BLD: 0.4 % (ref 0–1.9)
BILIRUB SERPL-MCNC: 0.4 MG/DL (ref 0.1–1)
BUN SERPL-MCNC: 11 MG/DL (ref 8–23)
CALCIUM SERPL-MCNC: 9.5 MG/DL (ref 8.7–10.5)
CHLORIDE SERPL-SCNC: 104 MMOL/L (ref 95–110)
CHOLEST SERPL-MCNC: 187 MG/DL (ref 120–199)
CHOLEST/HDLC SERPL: 4.3 {RATIO} (ref 2–5)
CO2 SERPL-SCNC: 25 MMOL/L (ref 23–29)
CREAT SERPL-MCNC: 0.9 MG/DL (ref 0.5–1.4)
DIFFERENTIAL METHOD: ABNORMAL
EOSINOPHIL # BLD AUTO: 0.5 K/UL (ref 0–0.5)
EOSINOPHIL NFR BLD: 6.1 % (ref 0–8)
ERYTHROCYTE [DISTWIDTH] IN BLOOD BY AUTOMATED COUNT: 14.4 % (ref 11.5–14.5)
EST. GFR  (AFRICAN AMERICAN): >60 ML/MIN/1.73 M^2
EST. GFR  (NON AFRICAN AMERICAN): >60 ML/MIN/1.73 M^2
ESTIMATED AVG GLUCOSE: 97 MG/DL (ref 68–131)
GLUCOSE SERPL-MCNC: 98 MG/DL (ref 70–110)
HBA1C MFR BLD HPLC: 5 % (ref 4–5.6)
HCT VFR BLD AUTO: 34.9 % (ref 40–54)
HDLC SERPL-MCNC: 44 MG/DL (ref 40–75)
HDLC SERPL: 23.5 % (ref 20–50)
HGB BLD-MCNC: 10.4 G/DL (ref 14–18)
IMM GRANULOCYTES # BLD AUTO: 0.02 K/UL (ref 0–0.04)
IMM GRANULOCYTES NFR BLD AUTO: 0.3 % (ref 0–0.5)
LDLC SERPL CALC-MCNC: 123.2 MG/DL (ref 63–159)
LYMPHOCYTES # BLD AUTO: 1.4 K/UL (ref 1–4.8)
LYMPHOCYTES NFR BLD: 19.4 % (ref 18–48)
MCH RBC QN AUTO: 30.4 PG (ref 27–31)
MCHC RBC AUTO-ENTMCNC: 29.8 G/DL (ref 32–36)
MCV RBC AUTO: 102 FL (ref 82–98)
MONOCYTES # BLD AUTO: 0.6 K/UL (ref 0.3–1)
MONOCYTES NFR BLD: 7.5 % (ref 4–15)
NEUTROPHILS # BLD AUTO: 4.9 K/UL (ref 1.8–7.7)
NEUTROPHILS NFR BLD: 66.3 % (ref 38–73)
NONHDLC SERPL-MCNC: 143 MG/DL
NRBC BLD-RTO: 0 /100 WBC
PLATELET # BLD AUTO: 330 K/UL (ref 150–350)
PMV BLD AUTO: 8.5 FL (ref 9.2–12.9)
POTASSIUM SERPL-SCNC: 4.4 MMOL/L (ref 3.5–5.1)
PROT SERPL-MCNC: 7.2 G/DL (ref 6–8.4)
RBC # BLD AUTO: 3.42 M/UL (ref 4.6–6.2)
SODIUM SERPL-SCNC: 137 MMOL/L (ref 136–145)
TRIGL SERPL-MCNC: 99 MG/DL (ref 30–150)
WBC # BLD AUTO: 7.43 K/UL (ref 3.9–12.7)

## 2019-11-29 PROCEDURE — 80061 LIPID PANEL: CPT

## 2019-11-29 PROCEDURE — 80053 COMPREHEN METABOLIC PANEL: CPT

## 2019-11-29 PROCEDURE — 85025 COMPLETE CBC W/AUTO DIFF WBC: CPT

## 2019-11-29 PROCEDURE — 83036 HEMOGLOBIN GLYCOSYLATED A1C: CPT

## 2019-11-29 PROCEDURE — 36415 COLL VENOUS BLD VENIPUNCTURE: CPT | Mod: PO

## 2019-12-01 RX ORDER — OMEPRAZOLE 10 MG/1
10 CAPSULE, DELAYED RELEASE ORAL
COMMUNITY
End: 2020-12-14

## 2019-12-02 ENCOUNTER — OFFICE VISIT (OUTPATIENT)
Dept: FAMILY MEDICINE | Facility: CLINIC | Age: 69
End: 2019-12-02
Payer: COMMERCIAL

## 2019-12-02 ENCOUNTER — LAB VISIT (OUTPATIENT)
Dept: LAB | Facility: HOSPITAL | Age: 69
End: 2019-12-02
Attending: INTERNAL MEDICINE
Payer: COMMERCIAL

## 2019-12-02 VITALS
HEART RATE: 97 BPM | HEIGHT: 72 IN | BODY MASS INDEX: 27.86 KG/M2 | DIASTOLIC BLOOD PRESSURE: 56 MMHG | TEMPERATURE: 98 F | WEIGHT: 205.69 LBS | SYSTOLIC BLOOD PRESSURE: 104 MMHG | OXYGEN SATURATION: 99 %

## 2019-12-02 DIAGNOSIS — Z23 NEED FOR 23-POLYVALENT PNEUMOCOCCAL POLYSACCHARIDE VACCINE: ICD-10-CM

## 2019-12-02 DIAGNOSIS — R73.03 PREDIABETES: ICD-10-CM

## 2019-12-02 DIAGNOSIS — Z23 FLU VACCINE NEED: ICD-10-CM

## 2019-12-02 DIAGNOSIS — Z12.9 SCREENING FOR CANCER: ICD-10-CM

## 2019-12-02 DIAGNOSIS — F41.9 MILD ANXIETY: ICD-10-CM

## 2019-12-02 DIAGNOSIS — Z87.81 HX OF FRACTURE OF ANKLE: ICD-10-CM

## 2019-12-02 DIAGNOSIS — M89.9 DISORDER OF BONE AND CARTILAGE: ICD-10-CM

## 2019-12-02 DIAGNOSIS — Z23 NEED FOR SHINGLES VACCINE: ICD-10-CM

## 2019-12-02 DIAGNOSIS — D64.9 ANEMIA, UNSPECIFIED TYPE: ICD-10-CM

## 2019-12-02 DIAGNOSIS — M94.9 DISORDER OF BONE AND CARTILAGE: ICD-10-CM

## 2019-12-02 DIAGNOSIS — Z23 NEED FOR STREPTOCOCCUS PNEUMONIAE VACCINATION: ICD-10-CM

## 2019-12-02 DIAGNOSIS — I70.0 AORTIC ARCH ATHEROSCLEROSIS: ICD-10-CM

## 2019-12-02 DIAGNOSIS — Z71.89 ADVANCED DIRECTIVES, COUNSELING/DISCUSSION: ICD-10-CM

## 2019-12-02 DIAGNOSIS — E66.3 OVERWEIGHT (BMI 25.0-29.9): ICD-10-CM

## 2019-12-02 DIAGNOSIS — I73.9 ASYMPTOMATIC PVD (PERIPHERAL VASCULAR DISEASE): ICD-10-CM

## 2019-12-02 DIAGNOSIS — Z12.5 PROSTATE CANCER SCREENING: ICD-10-CM

## 2019-12-02 DIAGNOSIS — Z00.00 ROUTINE MEDICAL EXAM: Primary | ICD-10-CM

## 2019-12-02 DIAGNOSIS — I10 ESSENTIAL HYPERTENSION: ICD-10-CM

## 2019-12-02 PROBLEM — J98.6 ELEVATED HEMIDIAPHRAGM: Status: ACTIVE | Noted: 2019-12-02

## 2019-12-02 LAB
25(OH)D3+25(OH)D2 SERPL-MCNC: 17 NG/ML (ref 30–96)
COMPLEXED PSA SERPL-MCNC: 1.9 NG/ML (ref 0–4)
FERRITIN SERPL-MCNC: 70 NG/ML (ref 20–300)
FOLATE SERPL-MCNC: 5.2 NG/ML (ref 4–24)
IRON SERPL-MCNC: 41 UG/DL (ref 45–160)
SATURATED IRON: 12 % (ref 20–50)
TOTAL IRON BINDING CAPACITY: 342 UG/DL (ref 250–450)
TRANSFERRIN SERPL-MCNC: 231 MG/DL (ref 200–375)
VIT B12 SERPL-MCNC: 206 PG/ML (ref 210–950)

## 2019-12-02 PROCEDURE — 90662 FLU VACCINE - HIGH DOSE (65+) PRESERVATIVE FREE IM: ICD-10-PCS | Mod: S$GLB,,, | Performed by: INTERNAL MEDICINE

## 2019-12-02 PROCEDURE — 82728 ASSAY OF FERRITIN: CPT

## 2019-12-02 PROCEDURE — 90732 PNEUMOCOCCAL POLYSACCHARIDE VACCINE 23-VALENT =>2YO SQ IM: ICD-10-PCS | Mod: S$GLB,,, | Performed by: INTERNAL MEDICINE

## 2019-12-02 PROCEDURE — 90662 IIV NO PRSV INCREASED AG IM: CPT | Mod: S$GLB,,, | Performed by: INTERNAL MEDICINE

## 2019-12-02 PROCEDURE — 3074F PR MOST RECENT SYSTOLIC BLOOD PRESSURE < 130 MM HG: ICD-10-PCS | Mod: CPTII,S$GLB,, | Performed by: INTERNAL MEDICINE

## 2019-12-02 PROCEDURE — 90732 PPSV23 VACC 2 YRS+ SUBQ/IM: CPT | Mod: S$GLB,,, | Performed by: INTERNAL MEDICINE

## 2019-12-02 PROCEDURE — 99397 PR PREVENTIVE VISIT,EST,65 & OVER: ICD-10-PCS | Mod: 25,S$GLB,, | Performed by: INTERNAL MEDICINE

## 2019-12-02 PROCEDURE — 82607 VITAMIN B-12: CPT

## 2019-12-02 PROCEDURE — 3078F PR MOST RECENT DIASTOLIC BLOOD PRESSURE < 80 MM HG: ICD-10-PCS | Mod: CPTII,S$GLB,, | Performed by: INTERNAL MEDICINE

## 2019-12-02 PROCEDURE — 90472 PNEUMOCOCCAL POLYSACCHARIDE VACCINE 23-VALENT =>2YO SQ IM: ICD-10-PCS | Mod: S$GLB,,, | Performed by: INTERNAL MEDICINE

## 2019-12-02 PROCEDURE — 36415 COLL VENOUS BLD VENIPUNCTURE: CPT | Mod: PO

## 2019-12-02 PROCEDURE — 3078F DIAST BP <80 MM HG: CPT | Mod: CPTII,S$GLB,, | Performed by: INTERNAL MEDICINE

## 2019-12-02 PROCEDURE — 90472 IMMUNIZATION ADMIN EACH ADD: CPT | Mod: S$GLB,,, | Performed by: INTERNAL MEDICINE

## 2019-12-02 PROCEDURE — 90471 FLU VACCINE - HIGH DOSE (65+) PRESERVATIVE FREE IM: ICD-10-PCS | Mod: S$GLB,,, | Performed by: INTERNAL MEDICINE

## 2019-12-02 PROCEDURE — 99999 PR PBB SHADOW E&M-EST. PATIENT-LVL IV: ICD-10-PCS | Mod: PBBFAC,,, | Performed by: INTERNAL MEDICINE

## 2019-12-02 PROCEDURE — 83540 ASSAY OF IRON: CPT

## 2019-12-02 PROCEDURE — 82306 VITAMIN D 25 HYDROXY: CPT

## 2019-12-02 PROCEDURE — 84153 ASSAY OF PSA TOTAL: CPT

## 2019-12-02 PROCEDURE — 99397 PER PM REEVAL EST PAT 65+ YR: CPT | Mod: 25,S$GLB,, | Performed by: INTERNAL MEDICINE

## 2019-12-02 PROCEDURE — 99999 PR PBB SHADOW E&M-EST. PATIENT-LVL IV: CPT | Mod: PBBFAC,,, | Performed by: INTERNAL MEDICINE

## 2019-12-02 PROCEDURE — 82746 ASSAY OF FOLIC ACID SERUM: CPT

## 2019-12-02 PROCEDURE — 90471 IMMUNIZATION ADMIN: CPT | Mod: S$GLB,,, | Performed by: INTERNAL MEDICINE

## 2019-12-02 PROCEDURE — 3074F SYST BP LT 130 MM HG: CPT | Mod: CPTII,S$GLB,, | Performed by: INTERNAL MEDICINE

## 2019-12-02 NOTE — PROGRESS NOTES
HISTORY OF PRESENT ILLNESS:  Vinnie Scott is a 69 y.o. male who presents to the clinic today for a routine medical physical exam. His last physical exam was approximately 2 years(s) ago.      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    Mild anxiety     Overweight(278.02)     Personal history of colonic polyps 2010, May 2015    Prediabetes     Urinary anastomotic stricture        PAST SURGICAL HISTORY:  Past Surgical History:   Procedure Laterality Date    ADENOIDECTOMY      COLONOSCOPY N/A 2017    Procedure: COLONOSCOPY;  Surgeon: Fatoumata Cook MD;  Location: [facility];  Service: Endoscopy;  Laterality: N/A;    FRACTURE SURGERY      Left collarbone    left ankle fracture repair Left 2018    TONSILLECTOMY      urinary stricture clearing      VASECTOMY         SOCIAL HISTORY:  Social History     Socioeconomic History    Marital status:      Spouse name: Not on file    Number of children: 2    Years of education: Not on file    Highest education level: Not on file   Occupational History    Occupation: Jini at [employer]     Employer: [employer]   Social Needs    Financial resource strain: Not on file    Food insecurity:     Worry: Not on file     Inability: Not on file    Transportation needs:     Medical: Not on file     Non-medical: Not on file   Tobacco Use    Smoking status: Former Smoker     Packs/day: 1.00     Years: 45.00     Pack years: 45.00     Types: Cigarettes     Last attempt to quit: 2011     Years since quittin.2    Smokeless tobacco: Never Used    Substance and Sexual Activity    Alcohol use: Yes     Comment: 4-5 beers daily    Drug use: No    Sexual activity: Not on file   Lifestyle    Physical activity:     Days per week: Not on file     Minutes per session: Not on file    Stress: Not on file   Relationships    Social connections:     Talks on phone: Not on file     Gets together: Not on file     Attends Religion service: Not on file     Active member of club or organization: Not on file     Attends meetings of clubs or organizations: Not on file     Relationship status: Not on file   Other Topics Concern    Not on file   Social History Narrative    Not on file       FAMILY HISTORY:  Family History   Problem Relation Age of Onset    Lung cancer Father     Diabetes Father     Uterine cancer Mother     Heart disease Mother     Drug abuse Brother     Alcohol abuse Brother     Prostate cancer Neg Hx     Colon cancer Neg Hx        ALLERGIES AND MEDICATIONS: updated and reviewed.  Review of patient's allergies indicates:   Allergen Reactions    Ciprofloxacin      Other reaction(s): Muscle pain    Lisinopril Other (See Comments)     cough     Medication List with Changes/Refills   Current Medications    AMLODIPINE (NORVASC) 5 MG TABLET    TAKE 1 TABLET(5 MG) BY MOUTH EVERY DAY    CITALOPRAM (CELEXA) 10 MG TABLET    Take 1 tablet (10 mg total) by mouth once daily.    FEXOFENADINE (ALLEGRA) 180 MG TABLET    Take 1 tablet (180 mg total) by mouth once daily.    FLUTICASONE (FLONASE) 50 MCG/ACTUATION NASAL SPRAY    1 spray (50 mcg total) by Each Nare route once daily.    LOSARTAN (COZAAR) 100 MG TABLET    Take 1 tablet (100 mg total) by mouth once daily.    METRONIDAZOLE 1% (METROGEL) 1 % GEL    1 application once daily. Apply to affected area    OMEPRAZOLE (PRILOSEC) 10 MG CAPSULE    Take 10 mg by mouth.   Discontinued Medications    AMLODIPINE (NORVASC) 5 MG TABLET    TAKE 1 TABLET(5 MG) BY MOUTH EVERY DAY    CITALOPRAM (CELEXA) 10 MG TABLET     Take 1 tablet (10 mg total) by mouth once daily.    LOSARTAN (COZAAR) 100 MG TABLET    Take 1 tablet (100 mg total) by mouth once daily.    OXYCODONE-ACETAMINOPHEN (PERCOCET) 5-325 MG PER TABLET    Take 1 tablet by mouth every 8 (eight) hours as needed for Pain.         CARE TEAM:  Patient Care Team:  Janine Acuña MD as PCP - General (Internal Medicine)  Eligio Culp Jr., MD as Consulting Physician (Urology)           SCREENING HISTORY:  Health Maintenance       Date Due Completion Date    Shingles Vaccine (1 of 2) 05/04/2002 ---    LDCT Lung Screen 05/04/2007 ---    PROSTATE-SPECIFIC ANTIGEN 04/07/2018 4/7/2017    Pneumococcal Vaccine (65+ Low/Medium Risk) (2 of 2 - PPSV23) 07/05/2018 7/5/2017    Colonoscopy 05/31/2020 5/31/2017    Lipid Panel 11/29/2020 11/29/2019    TETANUS VACCINE 04/13/2025 4/13/2015            REVIEW OF SYSTEMS:   The patient reports: fair dietary habits.  The patient reports : that they do not exercise, but stay active.  Review of Systems   Constitutional: Negative for chills, fatigue, fever and unexpected weight change.   HENT: Negative for congestion, ear pain, sore throat and voice change.    Eyes: Negative for photophobia, pain, discharge and visual disturbance.   Respiratory: Positive for cough (- he has a persistent cough since a recent URI). Negative for shortness of breath and wheezing.    Cardiovascular: Negative for chest pain, palpitations and leg swelling.   Gastrointestinal: Negative for abdominal pain, blood in stool, constipation, diarrhea, nausea and vomiting.   Genitourinary: Negative for dysuria and frequency.   Musculoskeletal: Positive for back pain (- mild; chronic). Negative for gait problem, joint swelling and neck stiffness.   Skin: Negative for color change and rash.   Neurological: Negative for seizures, weakness and headaches.   Hematological: Negative for adenopathy. Does not bruise/bleed easily.   Psychiatric/Behavioral: Negative for behavioral problems  and dysphoric mood. The patient is not nervous/anxious.      ROS : patient denies: erectile dysfunction, post void dribbling, slow urinary stream and difficulty initiating urination          PHYSICAL EXAM:  Vitals:    12/02/19 1059   BP: (!) 104/56   Pulse: 97   Temp: 98 °F (36.7 °C)     Weight: 93.3 kg (205 lb 11 oz)   Height: 6' (182.9 cm)   Body mass index is 27.9 kg/m².    General appearance - alert, well appearing, and in no distress, overweight  Psychiatric - alert, oriented to person, place, and time, normal behavior, speech, dress, motor activity, mildly anxious (baseline)  Eyes - pupils equal and reactive, extraocular eye movements intact, sclera anicteric  Ears - bilateral TM's and external ear canals normal, scant cerumen noted - bilateral  Mouth - mucous membranes moist, pharynx normal without lesions  Neck - supple, no significant adenopathy, carotids upstroke normal bilaterally, no bruits, thyroid exam: thyroid is normal in size without nodules or tenderness  Lymphatics - no palpable cervical lymphadenopathy  Chest - clear to auscultation, no wheezes, rales or rhonchi, symmetric air entry  Heart - normal rate and regular rhythm, no gallops noted  Abdomen - soft, nontender, nondistended, no masses or organomegaly   Male - not examined  Back exam - mild limit in range of motion noted on exam, mild pain with motion noted during exam, in depth exam deferred  Neurological - alert, normal speech, no focal findings or movement disorder noted, cranial nerves II through XII intact  Musculoskeletal - patient noted to have Mild-Moderate osteoarthritic changes to both knee joints. No joint effusions noted., no muscular tenderness noted  Extremities - peripheral pulses normal, no pedal edema, no clubbing or cyanosis  Skin - normal coloration and turgor, no rashes, no suspicious skin lesions noted      Labs:  Results for orders placed or performed in visit on 11/29/19   Hemoglobin A1c   Result Value Ref Range     Hemoglobin A1C 5.0 4.0 - 5.6 %    Estimated Avg Glucose 97 68 - 131 mg/dL   Lipid panel   Result Value Ref Range    Cholesterol 187 120 - 199 mg/dL    Triglycerides 99 30 - 150 mg/dL    HDL 44 40 - 75 mg/dL    LDL Cholesterol 123.2 63.0 - 159.0 mg/dL    Hdl/Cholesterol Ratio 23.5 20.0 - 50.0 %    Total Cholesterol/HDL Ratio 4.3 2.0 - 5.0    Non-HDL Cholesterol 143 mg/dL   Comprehensive metabolic panel   Result Value Ref Range    Sodium 137 136 - 145 mmol/L    Potassium 4.4 3.5 - 5.1 mmol/L    Chloride 104 95 - 110 mmol/L    CO2 25 23 - 29 mmol/L    Glucose 98 70 - 110 mg/dL    BUN, Bld 11 8 - 23 mg/dL    Creatinine 0.9 0.5 - 1.4 mg/dL    Calcium 9.5 8.7 - 10.5 mg/dL    Total Protein 7.2 6.0 - 8.4 g/dL    Albumin 3.6 3.5 - 5.2 g/dL    Total Bilirubin 0.4 0.1 - 1.0 mg/dL    Alkaline Phosphatase 70 55 - 135 U/L    AST 14 10 - 40 U/L    ALT 14 10 - 44 U/L    Anion Gap 8 8 - 16 mmol/L    eGFR if African American >60.0 >60 mL/min/1.73 m^2    eGFR if non African American >60.0 >60 mL/min/1.73 m^2   CBC auto differential   Result Value Ref Range    WBC 7.43 3.90 - 12.70 K/uL    RBC 3.42 (L) 4.60 - 6.20 M/uL    Hemoglobin 10.4 (L) 14.0 - 18.0 g/dL    Hematocrit 34.9 (L) 40.0 - 54.0 %    Mean Corpuscular Volume 102 (H) 82 - 98 fL    Mean Corpuscular Hemoglobin 30.4 27.0 - 31.0 pg    Mean Corpuscular Hemoglobin Conc 29.8 (L) 32.0 - 36.0 g/dL    RDW 14.4 11.5 - 14.5 %    Platelets 330 150 - 350 K/uL    MPV 8.5 (L) 9.2 - 12.9 fL    Immature Granulocytes 0.3 0.0 - 0.5 %    Gran # (ANC) 4.9 1.8 - 7.7 K/uL    Immature Grans (Abs) 0.02 0.00 - 0.04 K/uL    Lymph # 1.4 1.0 - 4.8 K/uL    Mono # 0.6 0.3 - 1.0 K/uL    Eos # 0.5 0.0 - 0.5 K/uL    Baso # 0.03 0.00 - 0.20 K/uL    nRBC 0 0 /100 WBC    Gran% 66.3 38.0 - 73.0 %    Lymph% 19.4 18.0 - 48.0 %    Mono% 7.5 4.0 - 15.0 %    Eosinophil% 6.1 0.0 - 8.0 %    Basophil% 0.4 0.0 - 1.9 %    Differential Method Automated             ASSESSMENT AND PLAN:  1. Routine medical exam  Counseled on  age appropriate medical preventative services including age appropriate cancer screenings, age appropriate eye and dental exams, over all nutritional health, need for a consistent exercise regimen, and an over all push towards maintaining a vigorous and active lifestyle.  Counseled on age appropriate vaccines and discussed upcoming health care needs based on age/gender. Discussed good sleep hygiene and stress management.     2. Essential hypertension  Discussed sodium restriction, maintaining ideal body weight and regular exercise program as physiologic means to achieve blood pressure control. The patient will strive towards this. The current medical regimen is effective;  continue present plan and medications. Recommended patient to check home readings to monitor and see me for followup as scheduled or sooner as needed. Patient was educated that both decongestant and anti-inflammatory medication may raise blood pressure.     3. Prediabetes  Stable. We discussed low sugar/low carbohydrate diet and regular exercise to prevent progression. No need for prescription medication at this time.     4. Mild anxiety  The current medical regimen is effective;  continue present plan and medications.     5. Overweight (BMI 25.0-29.9)  The patient is asked to make an attempt to improve diet and exercise patterns to aid in medical management of this problem.     6. Flu vaccine need    - Influenza - High Dose (65+) (PF) (IM)    7. Need for shingles vaccine  Deferred today.    8. Advanced directives, counseling/discussion  Advance Care Planning   I initiated the process and explained the importance of advance care planning today with the patient.  Advanced directives were discussed due to patient's age and/or chronic medical conditions. Prognosis based on current condition: good.   Paperwork was given to complete living will (at this point in time, the patient does have full decision-making capacity.  We discussed different extreme  health states that he could experience, and reviewed what kind of medical care he would want in those situations) and medical POA (The patient received detailed information about the importance of designating a Health Care Power of . He was also instructed to communicate with this person about their wishes for future healthcare, should he become sick and lose decision-making capacity).   [place] was not discussed.   Approximately 3 minute(s) were spent on counseling/discussion regarding end of life decision making.            9. Anemia, unspecified type  His blood count has dropped a significant amount since it was checked year ago.  MCV is elevated.  He denies any significant alcohol intake.  I will check blood work and address results accordingly.  - Iron and TIBC; Future  - Ferritin; Future  - Folate; Future  - Vitamin B12; Future    10. Screening for cancer  He is a former smoker with a greater than 30 pack year history of smoking.  I will do some low-dose lung cancer screening.  He has a somewhat persistent cough since recent URI.  We discussed over-the-counter Mucinex DM or dull some as well as cough drops for suppression.  - CT Chest Lung Screening Low Dose; Future    11. Hx of fracture of ankle/12.  Disorder of bone and cartilage  He fractured his ankle a year ago.  I will check a bone density test and vitamin-D level.  - Vitamin D; Future  - DXA Bone Density Spine And Hip; Future    13. Prostate cancer screening  The natural history of prostate cancer and ongoing controversy regarding screening and potential treatment outcomes of prostate cancer has been discussed with the patient. The meaning of a false positive PSA and a false negative PSA has been discussed. He indicates understanding of the limitations of this screening test and wishes to proceed with screening PSA testing.   - PSA, Screening; Future    14. Need for 23-polyvalent pneumococcal polysaccharide vaccine    - (In Office Administered)  Pneumococcal Polysaccharide Vaccine (23 Valent) (SQ/IM)    15. Asymptomatic PVD (peripheral vascular disease)  Incidental finding on ankle x-ray last year.  Patient denies claudication symptoms.  Observe.    16. Aortic arch atherosclerosis  Patient with Atherosclerosis of the Aorta.  Stable/asymptomatic. Currently stable on lipid and b/p monitoring.  We may need to consider starting statin medication in the future for cardiovascular protection.           Follow up in about 1 year (around 12/2/2020), or if symptoms worsen or fail to improve, for annual exam. or sooner as needed.

## 2020-01-02 ENCOUNTER — HOSPITAL ENCOUNTER (OUTPATIENT)
Dept: RADIOLOGY | Facility: CLINIC | Age: 70
Discharge: HOME | End: 2020-01-02
Attending: INTERNAL MEDICINE
Payer: COMMERCIAL

## 2020-01-02 DIAGNOSIS — M89.9 DISORDER OF BONE AND CARTILAGE: ICD-10-CM

## 2020-01-02 DIAGNOSIS — M94.9 DISORDER OF BONE AND CARTILAGE: ICD-10-CM

## 2020-01-02 PROCEDURE — 77080 DXA BONE DENSITY AXIAL: CPT | Mod: TC

## 2020-01-02 PROCEDURE — 77080 DXA BONE DENSITY AXIAL: CPT | Mod: 26,,, | Performed by: INTERNAL MEDICINE

## 2020-01-02 PROCEDURE — 77080 DEXA BONE DENSITY SPINE HIP: ICD-10-PCS | Mod: 26,,, | Performed by: INTERNAL MEDICINE

## 2020-01-06 DIAGNOSIS — F43.9 STRESS: ICD-10-CM

## 2020-01-06 DIAGNOSIS — I10 ESSENTIAL HYPERTENSION: ICD-10-CM

## 2020-01-06 RX ORDER — CITALOPRAM 10 MG/1
10 TABLET ORAL DAILY
Qty: 30 TABLET | Refills: 0 | Status: SHIPPED | OUTPATIENT
Start: 2020-01-06 | End: 2020-02-06 | Stop reason: SDUPTHER

## 2020-01-06 RX ORDER — LOSARTAN POTASSIUM 100 MG/1
100 TABLET ORAL DAILY
Qty: 30 TABLET | Refills: 0 | Status: SHIPPED | OUTPATIENT
Start: 2020-01-06 | End: 2020-02-06 | Stop reason: SDUPTHER

## 2020-01-06 RX ORDER — AMLODIPINE BESYLATE 5 MG/1
TABLET ORAL
Qty: 30 TABLET | Refills: 0 | Status: SHIPPED | OUTPATIENT
Start: 2020-01-06 | End: 2020-02-06 | Stop reason: SDUPTHER

## 2020-01-09 PROBLEM — M85.859 OSTEOPENIA OF FEMORAL NECK: Status: ACTIVE | Noted: 2020-01-09

## 2020-01-10 ENCOUNTER — PATIENT MESSAGE (OUTPATIENT)
Dept: FAMILY MEDICINE | Facility: CLINIC | Age: 70
End: 2020-01-10

## 2020-01-12 PROBLEM — E55.9 VITAMIN D DEFICIENCY: Status: ACTIVE | Noted: 2020-01-12

## 2020-01-13 ENCOUNTER — OFFICE VISIT (OUTPATIENT)
Dept: FAMILY MEDICINE | Facility: CLINIC | Age: 70
End: 2020-01-13
Payer: COMMERCIAL

## 2020-01-13 VITALS
WEIGHT: 205.69 LBS | OXYGEN SATURATION: 94 % | SYSTOLIC BLOOD PRESSURE: 100 MMHG | TEMPERATURE: 98 F | HEART RATE: 102 BPM | HEIGHT: 72 IN | DIASTOLIC BLOOD PRESSURE: 60 MMHG | BODY MASS INDEX: 27.86 KG/M2

## 2020-01-13 DIAGNOSIS — I70.0 AORTIC ARCH ATHEROSCLEROSIS: ICD-10-CM

## 2020-01-13 DIAGNOSIS — E55.9 VITAMIN D DEFICIENCY: ICD-10-CM

## 2020-01-13 DIAGNOSIS — M85.859 OSTEOPENIA OF NECK OF FEMUR, UNSPECIFIED LATERALITY: Primary | ICD-10-CM

## 2020-01-13 DIAGNOSIS — Z23 NEED FOR SHINGLES VACCINE: ICD-10-CM

## 2020-01-13 DIAGNOSIS — K21.9 GASTROESOPHAGEAL REFLUX DISEASE WITHOUT ESOPHAGITIS: ICD-10-CM

## 2020-01-13 DIAGNOSIS — I10 ESSENTIAL HYPERTENSION: ICD-10-CM

## 2020-01-13 PROCEDURE — 99214 PR OFFICE/OUTPT VISIT, EST, LEVL IV, 30-39 MIN: ICD-10-PCS | Mod: S$GLB,,, | Performed by: INTERNAL MEDICINE

## 2020-01-13 PROCEDURE — 99999 PR PBB SHADOW E&M-EST. PATIENT-LVL III: CPT | Mod: PBBFAC,,, | Performed by: INTERNAL MEDICINE

## 2020-01-13 PROCEDURE — 3078F PR MOST RECENT DIASTOLIC BLOOD PRESSURE < 80 MM HG: ICD-10-PCS | Mod: CPTII,S$GLB,, | Performed by: INTERNAL MEDICINE

## 2020-01-13 PROCEDURE — 1101F PT FALLS ASSESS-DOCD LE1/YR: CPT | Mod: CPTII,S$GLB,, | Performed by: INTERNAL MEDICINE

## 2020-01-13 PROCEDURE — 1101F PR PT FALLS ASSESS DOC 0-1 FALLS W/OUT INJ PAST YR: ICD-10-PCS | Mod: CPTII,S$GLB,, | Performed by: INTERNAL MEDICINE

## 2020-01-13 PROCEDURE — 1159F PR MEDICATION LIST DOCUMENTED IN MEDICAL RECORD: ICD-10-PCS | Mod: S$GLB,,, | Performed by: INTERNAL MEDICINE

## 2020-01-13 PROCEDURE — 99214 OFFICE O/P EST MOD 30 MIN: CPT | Mod: S$GLB,,, | Performed by: INTERNAL MEDICINE

## 2020-01-13 PROCEDURE — 1159F MED LIST DOCD IN RCRD: CPT | Mod: S$GLB,,, | Performed by: INTERNAL MEDICINE

## 2020-01-13 PROCEDURE — 3074F SYST BP LT 130 MM HG: CPT | Mod: CPTII,S$GLB,, | Performed by: INTERNAL MEDICINE

## 2020-01-13 PROCEDURE — 3074F PR MOST RECENT SYSTOLIC BLOOD PRESSURE < 130 MM HG: ICD-10-PCS | Mod: CPTII,S$GLB,, | Performed by: INTERNAL MEDICINE

## 2020-01-13 PROCEDURE — 1126F AMNT PAIN NOTED NONE PRSNT: CPT | Mod: S$GLB,,, | Performed by: INTERNAL MEDICINE

## 2020-01-13 PROCEDURE — 3078F DIAST BP <80 MM HG: CPT | Mod: CPTII,S$GLB,, | Performed by: INTERNAL MEDICINE

## 2020-01-13 PROCEDURE — 1126F PR PAIN SEVERITY QUANTIFIED, NO PAIN PRESENT: ICD-10-PCS | Mod: S$GLB,,, | Performed by: INTERNAL MEDICINE

## 2020-01-13 PROCEDURE — 99999 PR PBB SHADOW E&M-EST. PATIENT-LVL III: ICD-10-PCS | Mod: PBBFAC,,, | Performed by: INTERNAL MEDICINE

## 2020-01-13 RX ORDER — ALENDRONATE SODIUM 70 MG/1
70 TABLET ORAL
Qty: 12 TABLET | Refills: 3 | Status: SHIPPED | OUTPATIENT
Start: 2020-01-13 | End: 2020-04-03 | Stop reason: SDUPTHER

## 2020-01-13 NOTE — PROGRESS NOTES
HISTORY OF PRESENT ILLNESS:  Vinnie Scott is a 69 y.o. male who presents to the clinic today for Osteoporosis (bmd f/u)  .   The patient presents to clinic today to discuss treatment options for newly diagnosed osteopenia.  He has started taking vitamin-D 2000 units daily.  He recently had a bone density test done as he had a ankle fracture last year and he has taken omeprazole for many years for his GERD.  He is not taking his omeprazole only once a week or every other week.  For the most part he tries to control his reflux with dietary changes.  He has also cut back on his alcohol intake.  He states blood pressures are well controlled.  He is currently not doing much with exercise.  He admits that his balance is not great.      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    Mild anxiety     Overweight(278.02)     Personal history of colonic polyps October 2010, May 2015    Prediabetes     Urinary anastomotic stricture        PAST SURGICAL HISTORY:  Past Surgical History:   Procedure Laterality Date    ADENOIDECTOMY      COLONOSCOPY N/A 5/31/2017    Procedure: COLONOSCOPY;  Surgeon: Fatoumata Cook MD;  Location: [facility];  Service: Endoscopy;  Laterality: N/A;    FRACTURE SURGERY      Left collarbone    left ankle fracture repair Left 12/2018    TONSILLECTOMY      urinary stricture clearing      VASECTOMY         SOCIAL HISTORY:  Social History     Socioeconomic History    Marital status:      Spouse name: Not on file    Number of children: 2    Years of education: Not on file    Highest education level: Not on file    Occupational History    Occupation: 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[employer]     Employer: [employer]   Social Needs    Financial resource strain: Not on file    Food insecurity:     Worry: Not on file     Inability: Not on file    Transportation needs:     Medical: Not on file     Non-medical: Not on file   Tobacco Use    Smoking status: Former Smoker     Packs/day: 1.00     Years: 45.00     Pack years: 45.00     Types: Cigarettes     Last attempt to quit: 2011     Years since quittin.3    Smokeless tobacco: Never Used   Substance and Sexual Activity    Alcohol use: Yes     Comment: 4-5 beers daily    Drug use: No    Sexual activity: Not on file   Lifestyle    Physical activity:     Days per week: Not on file     Minutes per session: Not on file    Stress: Not on file   Relationships    Social connections:     Talks on phone: Not on file     Gets together: Not on file     Attends Judaism service: Not on file     Active member of club or organization: Not on file     Attends meetings of clubs or organizations: Not on file     Relationship status: Not on file   Other Topics Concern    Not on file   Social History Narrative    Not on file       FAMILY HISTORY:  Family History   Problem Relation Age of Onset    Lung cancer Father     Diabetes Father     Uterine cancer Mother     Heart disease Mother     Drug abuse Brother     Alcohol abuse Brother     Prostate cancer Neg Hx     Colon cancer Neg Hx        ALLERGIES AND MEDICATIONS: updated and reviewed.  Review of patient's allergies indicates:   Allergen Reactions    Ciprofloxacin      Other reaction(s): Muscle pain    Lisinopril Other (See Comments)     cough     Medication List with Changes/Refills   New Medications    ALENDRONATE (FOSAMAX) 70 MG TABLET    Take 1 tablet (70 mg total) by mouth every 7 days.   Current Medications    AMLODIPINE (NORVASC) 5 MG TABLET    TAKE 1 TABLET(5 MG) BY MOUTH EVERY DAY    CITALOPRAM  (CELEXA) 10 MG TABLET    Take 1 tablet (10 mg total) by mouth once daily.    FEXOFENADINE (ALLEGRA) 180 MG TABLET    Take 1 tablet (180 mg total) by mouth once daily.    FLUTICASONE (FLONASE) 50 MCG/ACTUATION NASAL SPRAY    1 spray (50 mcg total) by Each Nare route once daily.    LOSARTAN (COZAAR) 100 MG TABLET    Take 1 tablet (100 mg total) by mouth once daily.    METRONIDAZOLE 1% (METROGEL) 1 % GEL    1 application once daily. Apply to affected area    OMEPRAZOLE (PRILOSEC) 10 MG CAPSULE    Take 10 mg by mouth.          CARE TEAM:  Patient Care Team:  Janine Acuña MD as PCP - General (Internal Medicine)  Eligio Culp Jr., MD as Consulting Physician (Urology)         REVIEW OF SYSTEMS:  Review of Systems   Constitutional: Negative for chills, fatigue, fever and unexpected weight change.   HENT: Negative for congestion, ear pain, sore throat and voice change.    Eyes: Negative for photophobia, pain, discharge and visual disturbance.   Respiratory: Negative for cough, shortness of breath and wheezing.    Cardiovascular: Negative for chest pain, palpitations and leg swelling.   Gastrointestinal: Negative for abdominal pain, blood in stool, constipation, diarrhea, nausea and vomiting.   Genitourinary: Negative for dysuria and frequency.   Musculoskeletal: Positive for arthralgias (- mild; chronic). Negative for gait problem, joint swelling and neck stiffness.   Skin: Negative for color change and rash.   Neurological: Negative for seizures, weakness and headaches.   Hematological: Negative for adenopathy. Does not bruise/bleed easily.   Psychiatric/Behavioral: Negative for behavioral problems and dysphoric mood. The patient is not nervous/anxious.          PHYSICAL EXAM:  Vitals:    01/13/20 1512   BP: 100/60   Pulse: 102   Temp: 98 °F (36.7 °C)     Weight: 93.3 kg (205 lb 11 oz)   Height: 6' (182.9 cm)   Body mass index is 27.9 kg/m².      General appearance - alert, well appearing, and in no distress,  overweight  Psychiatric - alert, oriented to person, place, and time, normal mood, behavior, speech, dress, motor activity, and thought processes  Eyes - pupils equal and reactive, extraocular eye movements intact, sclera anicteric  Mouth - mucous membranes moist, pharynx normal without lesions  Chest - clear to auscultation, no wheezes, rales or rhonchi, symmetric air entry  Heart - normal rate and regular rhythm, no gallops noted  Back exam - no significant limit in range of motion noted on exam, no significant pain with motion noted during exam, in depth exam deferred  Neurological - alert, normal speech, no focal findings or movement disorder noted, cranial nerves II through XII intact  Musculoskeletal - patient noted to have Mild osteoarthritic changes to both knee joints. No joint effusions noted., no muscular tenderness noted  Extremities - peripheral pulses normal, no pedal edema, no clubbing or cyanosis  Skin - normal coloration and turgor, no rashes, no suspicious skin lesions noted      Labs:  Pre-visit Labs - not applicable      ASSESSMENT AND PLAN:  1. Osteopenia of neck of femur, unspecified laterality/2. Vitamin D deficiency  We discussed adequate calcium and vitamin D supplementation. We discussed fall precautions. He is up to date on his BMD. We will start him on fosamax 70mg once a week. He will let me know if he develops any side effects.  - alendronate (FOSAMAX) 70 MG tablet; Take 1 tablet (70 mg total) by mouth every 7 days.  Dispense: 12 tablet; Refill: 3    3. Essential hypertension  Discussed sodium restriction, maintaining ideal body weight and regular exercise program as physiologic means to achieve blood pressure control. The patient will strive towards this. The current medical regimen is effective;  continue present plan and medications. Recommended patient to check home readings to monitor and see me for followup as scheduled or sooner as needed. Patient was educated that both  decongestant and anti-inflammatory medication may raise blood pressure.    4. Gastroesophageal reflux disease without esophagitis  Symptoms controlled: yes - takes medication occasionally as needed.   Reflux precautions discussed (eliminate tobacco if a smoker; minimize caffeine, chocolate and red/white peppermint intake; avoid heavy and spicy meals; don't lay down within 2-3 hours after eating; minimize the intake of NSAIDs). Medication as needed. Patient asked to take medication breaks, if possible - discussed chronic use can limit calcium absorption (which can lead to osteopenia/osteoporosis), increases the risk for intestinal infections, and can cause kidney damage. There are also some newer studies that show possible increased risk of mortality.    5. Aortic arch atherosclerosis  Patient with Atherosclerosis of the Aorta.  Stable/asymptomatic. Currently stable on lipid and b/p monitoring.  Patient will take under consideration starting statin medication for treatment.    6. Need for shingles vaccine  Patient was advised to get immunization at the pharmacy.           Follow up in about 11 months (around 12/13/2020). or sooner as needed.

## 2020-01-13 NOTE — PATIENT INSTRUCTIONS
National Osteoporosis [employer] Guide recommends treating patients with FRAX 10-year risk scores of > or = 3% for hip fracture or > or = 20% for major osteoporotic fracture, to reduce their fracture risk.    Calcium 1500 mg per day - preferably from food.

## 2020-01-14 ENCOUNTER — HOSPITAL ENCOUNTER (OUTPATIENT)
Dept: RADIOLOGY | Facility: HOSPITAL | Age: 70
Discharge: HOME | End: 2020-01-14
Attending: INTERNAL MEDICINE
Payer: COMMERCIAL

## 2020-01-14 DIAGNOSIS — Z12.9 SCREENING FOR CANCER: ICD-10-CM

## 2020-01-14 PROCEDURE — G0297 LDCT FOR LUNG CA SCREEN: HCPCS | Mod: 26,,, | Performed by: RADIOLOGY

## 2020-01-14 PROCEDURE — G0297 CT CHEST LUNG SCREENING LOW DOSE: ICD-10-PCS | Mod: 26,,, | Performed by: RADIOLOGY

## 2020-01-14 PROCEDURE — G0297 LDCT FOR LUNG CA SCREEN: HCPCS | Mod: TC

## 2020-02-04 ENCOUNTER — TELEPHONE (OUTPATIENT)
Dept: FAMILY MEDICINE | Facility: CLINIC | Age: 70
End: 2020-02-04

## 2020-02-04 DIAGNOSIS — E61.1 IRON DEFICIENCY: ICD-10-CM

## 2020-02-04 DIAGNOSIS — E53.8 B12 DEFICIENCY: Primary | ICD-10-CM

## 2020-02-04 NOTE — TELEPHONE ENCOUNTER
----- Message from Janine Acuña MD sent at 12/3/2019 10:23 AM CST -----  Recheck iron/ferritin/TIBC and B12 levels. (advised to supplement based on last lab results).

## 2020-02-05 ENCOUNTER — LAB VISIT (OUTPATIENT)
Dept: LAB | Facility: HOSPITAL | Age: 70
End: 2020-02-05
Attending: INTERNAL MEDICINE
Payer: COMMERCIAL

## 2020-02-05 DIAGNOSIS — E53.8 B12 DEFICIENCY: ICD-10-CM

## 2020-02-05 DIAGNOSIS — E61.1 IRON DEFICIENCY: ICD-10-CM

## 2020-02-05 LAB
BASOPHILS # BLD AUTO: 0.06 K/UL (ref 0–0.2)
BASOPHILS NFR BLD: 0.9 % (ref 0–1.9)
DIFFERENTIAL METHOD: ABNORMAL
EOSINOPHIL # BLD AUTO: 0.2 K/UL (ref 0–0.5)
EOSINOPHIL NFR BLD: 3 % (ref 0–8)
ERYTHROCYTE [DISTWIDTH] IN BLOOD BY AUTOMATED COUNT: 15.2 % (ref 11.5–14.5)
FERRITIN SERPL-MCNC: 60 NG/ML (ref 20–300)
HCT VFR BLD AUTO: 37.7 % (ref 40–54)
HGB BLD-MCNC: 11.4 G/DL (ref 14–18)
IMM GRANULOCYTES # BLD AUTO: 0.02 K/UL (ref 0–0.04)
IMM GRANULOCYTES NFR BLD AUTO: 0.3 % (ref 0–0.5)
IRON SERPL-MCNC: 216 UG/DL (ref 45–160)
LYMPHOCYTES # BLD AUTO: 0.9 K/UL (ref 1–4.8)
LYMPHOCYTES NFR BLD: 13.4 % (ref 18–48)
MCH RBC QN AUTO: 29.2 PG (ref 27–31)
MCHC RBC AUTO-ENTMCNC: 30.2 G/DL (ref 32–36)
MCV RBC AUTO: 97 FL (ref 82–98)
MONOCYTES # BLD AUTO: 0.7 K/UL (ref 0.3–1)
MONOCYTES NFR BLD: 9.3 % (ref 4–15)
NEUTROPHILS # BLD AUTO: 5.1 K/UL (ref 1.8–7.7)
NEUTROPHILS NFR BLD: 73.1 % (ref 38–73)
NRBC BLD-RTO: 0 /100 WBC
PLATELET # BLD AUTO: 338 K/UL (ref 150–350)
PMV BLD AUTO: 8.4 FL (ref 9.2–12.9)
RBC # BLD AUTO: 3.9 M/UL (ref 4.6–6.2)
SATURATED IRON: 66 % (ref 20–50)
TOTAL IRON BINDING CAPACITY: 326 UG/DL (ref 250–450)
TRANSFERRIN SERPL-MCNC: 220 MG/DL (ref 200–375)
VIT B12 SERPL-MCNC: >2000 PG/ML (ref 210–950)
WBC # BLD AUTO: 6.99 K/UL (ref 3.9–12.7)

## 2020-02-05 PROCEDURE — 36415 COLL VENOUS BLD VENIPUNCTURE: CPT | Mod: PO

## 2020-02-05 PROCEDURE — 85025 COMPLETE CBC W/AUTO DIFF WBC: CPT

## 2020-02-05 PROCEDURE — 82728 ASSAY OF FERRITIN: CPT

## 2020-02-05 PROCEDURE — 82607 VITAMIN B-12: CPT

## 2020-02-05 PROCEDURE — 83540 ASSAY OF IRON: CPT

## 2020-02-06 DIAGNOSIS — I10 ESSENTIAL HYPERTENSION: ICD-10-CM

## 2020-02-06 DIAGNOSIS — F43.9 STRESS: ICD-10-CM

## 2020-02-06 RX ORDER — AMLODIPINE BESYLATE 5 MG/1
TABLET ORAL
Qty: 30 TABLET | Refills: 0 | Status: SHIPPED | OUTPATIENT
Start: 2020-02-06 | End: 2020-03-10 | Stop reason: SDUPTHER

## 2020-02-06 RX ORDER — CITALOPRAM 10 MG/1
10 TABLET ORAL DAILY
Qty: 30 TABLET | Refills: 0 | Status: SHIPPED | OUTPATIENT
Start: 2020-02-06 | End: 2020-03-10 | Stop reason: SDUPTHER

## 2020-02-06 RX ORDER — LOSARTAN POTASSIUM 100 MG/1
100 TABLET ORAL DAILY
Qty: 30 TABLET | Refills: 0 | Status: SHIPPED | OUTPATIENT
Start: 2020-02-06 | End: 2020-03-10 | Stop reason: SDUPTHER

## 2020-02-11 ENCOUNTER — PATIENT MESSAGE (OUTPATIENT)
Dept: FAMILY MEDICINE | Facility: CLINIC | Age: 70
End: 2020-02-11

## 2020-03-07 DIAGNOSIS — I10 ESSENTIAL HYPERTENSION: ICD-10-CM

## 2020-03-07 DIAGNOSIS — F43.9 STRESS: ICD-10-CM

## 2020-03-07 RX ORDER — LOSARTAN POTASSIUM 100 MG/1
100 TABLET ORAL DAILY
Qty: 30 TABLET | Refills: 0 | Status: CANCELLED | OUTPATIENT
Start: 2020-03-07

## 2020-03-07 RX ORDER — CITALOPRAM 10 MG/1
10 TABLET ORAL DAILY
Qty: 30 TABLET | Refills: 0 | Status: CANCELLED | OUTPATIENT
Start: 2020-03-07 | End: 2021-03-07

## 2020-03-07 RX ORDER — AMLODIPINE BESYLATE 5 MG/1
TABLET ORAL
Qty: 30 TABLET | Refills: 0 | Status: CANCELLED | OUTPATIENT
Start: 2020-03-07

## 2020-03-10 ENCOUNTER — PATIENT MESSAGE (OUTPATIENT)
Dept: FAMILY MEDICINE | Facility: CLINIC | Age: 70
End: 2020-03-10

## 2020-03-10 DIAGNOSIS — I10 ESSENTIAL HYPERTENSION: ICD-10-CM

## 2020-03-10 DIAGNOSIS — F43.9 STRESS: ICD-10-CM

## 2020-03-10 RX ORDER — AMLODIPINE BESYLATE 5 MG/1
TABLET ORAL
Qty: 90 TABLET | Refills: 1 | Status: SHIPPED | OUTPATIENT
Start: 2020-03-10 | End: 2020-06-03 | Stop reason: SDUPTHER

## 2020-03-10 RX ORDER — CITALOPRAM 10 MG/1
10 TABLET ORAL DAILY
Qty: 90 TABLET | Refills: 1 | Status: SHIPPED | OUTPATIENT
Start: 2020-03-10 | End: 2020-06-03 | Stop reason: SDUPTHER

## 2020-03-10 RX ORDER — LOSARTAN POTASSIUM 100 MG/1
100 TABLET ORAL DAILY
Qty: 90 TABLET | Refills: 1 | Status: SHIPPED | OUTPATIENT
Start: 2020-03-10 | End: 2020-06-03 | Stop reason: SDUPTHER

## 2020-04-03 DIAGNOSIS — M85.859 OSTEOPENIA OF NECK OF FEMUR, UNSPECIFIED LATERALITY: ICD-10-CM

## 2020-04-03 RX ORDER — ALENDRONATE SODIUM 70 MG/1
70 TABLET ORAL
Qty: 12 TABLET | Refills: 2 | Status: SHIPPED | OUTPATIENT
Start: 2020-04-03 | End: 2020-06-26 | Stop reason: SDUPTHER

## 2020-06-03 DIAGNOSIS — F43.9 STRESS: ICD-10-CM

## 2020-06-03 DIAGNOSIS — I10 ESSENTIAL HYPERTENSION: ICD-10-CM

## 2020-06-03 RX ORDER — FLUTICASONE PROPIONATE 50 MCG
1 SPRAY, SUSPENSION (ML) NASAL DAILY
Qty: 48 G | Refills: 1 | Status: SHIPPED | OUTPATIENT
Start: 2020-06-03 | End: 2020-09-09 | Stop reason: SDUPTHER

## 2020-06-03 RX ORDER — CITALOPRAM 10 MG/1
10 TABLET ORAL DAILY
Qty: 90 TABLET | Refills: 1 | Status: SHIPPED | OUTPATIENT
Start: 2020-06-03 | End: 2020-09-09 | Stop reason: SDUPTHER

## 2020-06-03 RX ORDER — LOSARTAN POTASSIUM 100 MG/1
100 TABLET ORAL DAILY
Qty: 90 TABLET | Refills: 1 | Status: SHIPPED | OUTPATIENT
Start: 2020-06-03 | End: 2020-09-09 | Stop reason: SDUPTHER

## 2020-06-03 RX ORDER — AMLODIPINE BESYLATE 5 MG/1
TABLET ORAL
Qty: 90 TABLET | Refills: 1 | Status: SHIPPED | OUTPATIENT
Start: 2020-06-03 | End: 2020-09-09 | Stop reason: SDUPTHER

## 2020-06-03 NOTE — PROGRESS NOTES
HISTORY OF PRESENT ILLNESS:  Vinnie Scott is a 66 y.o. male who presents to the clinic today for a routine medical physical exam. His last physical exam was more than a year ago.      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    Mild anxiety     Overweight     Personal history of colonic polyps October 2010, May 2015    Prediabetes     Urinary anastomotic stricture        PAST SURGICAL HISTORY:  Past Surgical History:   Procedure Laterality Date    ADENOIDECTOMY      FRACTURE SURGERY      Left collarbone    TONSILLECTOMY      urinary stricture clearing      VASECTOMY         SOCIAL HISTORY:  Social History     Social History    Marital status:      Spouse name: N/A    Number of children: 2    Years of education: N/A     Occupational History    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[employer]     Social History Main Topics    Smoking status: Former Smoker     Types: Cigarettes     Quit date: 9/20/2011    Smokeless tobacco: Not on file    Alcohol use Yes      Comment: 4-5 beers daily    Drug use: No    Sexual activity: Not on file     Other Topics Concern    Not on file     Social History Narrative       FAMILY HISTORY:  Family History   Problem Relation Age of Onset    Lung cancer Father     Diabetes Father     Uterine cancer Mother     Heart disease Mother     Drug abuse Brother     Alcohol abuse Brother     Prostate cancer Neg Hx     Colon cancer Neg Hx        ALLERGIES AND MEDICATIONS: updated and reviewed.  Review of patient's allergies indicates:   Allergen Reactions    Ciprofloxacin      Other  reaction(s): Muscle pain    Lisinopril Other (See Comments)     cough     Medication List with Changes/Refills   New Medications    CITALOPRAM (CELEXA) 10 MG TABLET    Take 1 tablet (10 mg total) by mouth once daily.   Current Medications    ALPRAZOLAM (XANAX) 0.25 MG TABLET    Take 1 tablet (0.25 mg total) by mouth daily as needed for Anxiety.    DIPHENHYDRAMINE (SOMINEX) 25 MG TABLET    Take 25 mg by mouth nightly as needed for Insomnia.    FEXOFENADINE (ALLEGRA) 180 MG TABLET    Take 1 tablet (180 mg total) by mouth once daily.    LEVOCETIRIZINE (XYZAL) 5 MG TABLET    Take 1 tablet (5 mg total) by mouth every evening.    LORATADINE (CLARITIN) 10 MG TABLET    Take 1 tablet (10 mg total) by mouth once daily.    OMEGA-3 FATTY ACIDS-VITAMIN E (FISH OIL) 1,000 MG CAP    Take 1 capsule by mouth Daily. 1 Capsule Oral Every day    OMEPRAZOLE (PRILOSEC) 20 MG CAPSULE    Take 2 capsules (40 mg total) by mouth once daily.    SILDENAFIL (VIAGRA) 100 MG TABLET    Take 1 tablet (100 mg total) by mouth daily as needed for Erectile Dysfunction.   Changed and/or Refilled Medications    Modified Medication Previous Medication    AMLODIPINE (NORVASC) 5 MG TABLET amlodipine (NORVASC) 5 MG tablet       TAKE 1 TABLET(5 MG) BY MOUTH EVERY DAY    TAKE 1 TABLET(5 MG) BY MOUTH EVERY DAY    FLUTICASONE (FLONASE) 50 MCG/ACTUATION NASAL SPRAY fluticasone (FLONASE) 50 mcg/actuation nasal spray       1 spray by Each Nare route once daily.    1 spray by Each Nare route once daily.    LOSARTAN (COZAAR) 100 MG TABLET losartan (COZAAR) 100 MG tablet       Take 1 tablet (100 mg total) by mouth once daily.    TAKE 1 TABLET BY MOUTH DAILY             SCREENING HISTORY:  Health Maintenance       Date Due Completion Date    Zoster Vaccine 5/4/2012 ---    Colonoscopy 5/29/2017 5/29/2015    Lipid Panel 3/11/2021 3/11/2016    TETANUS VACCINE 4/13/2025 4/13/2015            REVIEW OF SYSTEMS:  The patient reports fair dietary habits.  The patient does not  exercise regularly.  General ROS: negative for - chills, fever or malaise; he switched to having to work nights this past December and he is having a hard time finding a 'routine'  Psychological ROS: positive for - anxiety and stress  negative for - memory difficulties, obsessive thoughts or suicidal ideation  Ophthalmic ROS: negative for - blurry vision or eye pain  ENT ROS: negative for - epistaxis, oral lesions or sore throat  Allergy and Immunology ROS: positive for - seasonal allergies  negative for - hives  Hematological and Lymphatic ROS: negative for - swollen lymph nodes  Endocrine ROS: negative for - polydipsia/polyuria or temperature intolerance  Respiratory ROS: positive for - cough  negative for - hemoptysis, sputum changes or wheezing  Cardiovascular ROS: no chest pain or dyspnea on exertion  Gastrointestinal ROS: negative for - appetite loss, blood in stools, hematemesis, nausea/vomiting or swallowing difficulty/pain  Dermatological ROS: negative for mole changes and rash  Musculoskeletal ROS: negative for - gait disturbance, joint swelling, muscle pain or muscular weakness  Neurological ROS: no TIA or stroke symptoms  Genito-Urinary ROS: no dysuria, trouble voiding, or hematuria; he has a urethral stricture and has to catheterize himself - followed by urology        PHYSICAL EXAM:  Vitals:    04/05/17 0909   BP: (!) 146/98   Pulse: 82   Temp: 98.9 °F (37.2 °C)     Weight: 90.5 kg (199 lb 10 oz)   Height: 6' (182.9 cm)   Body mass index is 27.07 kg/(m^2).    Physical Examination: General appearance - alert, well appearing, and in no distress and overweight  Mental status - alert, oriented to person, place, and time, normal behavior, speech, dress, motor activity, and thought processes, anxious  Eyes - pupils equal and reactive, extraocular eye movements intact, sclera anicteric  Ears - Right TM obscured by cerumen and old drainage; left TM shows old scarring  Nose - normal nontender sinuses and  mucosal congestion  Mouth - mucous membranes moist, pharynx normal without lesions  Neck - supple, no significant adenopathy, carotids upstroke normal bilaterally, no bruits, thyroid exam: thyroid is normal in size without nodules or tenderness  Lymphatics - no palpable lymphadenopathy  Chest - clear to auscultation, no wheezes, rales or rhonchi, symmetric air entry  Heart - normal rate and regular rhythm, no gallops noted  Abdomen - soft, nontender, nondistended, no masses or organomegaly   Male - not examined  Back exam - full range of motion, no tenderness, palpable spasm or pain on motion  Neurological - alert, oriented, normal speech, no focal findings or movement disorder noted, cranial nerves II through XII intact  Musculoskeletal - no joint tenderness, deformity or swelling, no muscular tenderness noted  Extremities - no pedal edema noted  Skin - normal coloration and turgor, no rashes, no suspicious skin lesions noted       ASSESSMENT AND PLAN:  1. Routine medical exam  Counseled on age appropriate medical preventative services including age appropriate cancer screenings, age appropriate eye and dental exams, over all nutritional health, need for a consistent exercise regimen, and an over all push towards maintaining a vigorous and active lifestyle.  Counseled on age appropriate vaccines and discussed upcoming health care needs based on age/gender. Discussed good sleep hygiene and stress management.     2. Essential hypertension  Blood pressures are elevated today.  Patient has been out of his medication for the last few days.  We discussed low-salt diet and regular exercise.  Follow-up in 3 months.    - amlodipine (NORVASC) 5 MG tablet; TAKE 1 TABLET(5 MG) BY MOUTH EVERY DAY  Dispense: 90 tablet; Refill: 1  - losartan (COZAAR) 100 MG tablet; Take 1 tablet (100 mg total) by mouth once daily.  Dispense: 90 tablet; Refill: 1  - CBC auto differential; Future  - Comprehensive metabolic panel; Future  - Lipid  "panel; Future    3. Gastroesophageal reflux disease, esophagitis presence not specified  Symptoms controlled. Reflux precautions discussed (eliminate tobacco if a smoker; minimize caffeine, chocolate and red/white peppermint intake; avoid heavy and spicy meals; don't lay down within 2-3 hours after eating). Medication as needed. Patient asked to take medication breaks, if possible - discussed chronic use can limit calcium absorption, increases the risk for intestinal infections, and can cause kidney damage.      4. Prediabetes  Stable. We discussed low sugar/low carbohydrate diet and regular exercise to prevent progression. No need for prescription medication at this time.   - Hemoglobin A1c; Future    5. Non-seasonal allergic rhinitis, unspecified allergic rhinitis trigger  We discussed several treatment strategies: antihistamine at bedtime, flonase in the morning. We also discussed saline nasal rinse in the evening as needed. I recommended allergy covers for pillow and mattress. Patient will let me know if symptoms worsen or persist.  He is concerned about a chronic cough.  He had a normal chest x-ray a few years ago.  He is a former smoker.  Consider CT scan low-dose screening at next office visit if symptoms do not improve with improved treatment of his ALLERGIES.  - fluticasone (FLONASE) 50 mcg/actuation nasal spray; 1 spray by Each Nare route once daily.  Dispense: 48 g; Refill: 1    6. Stress/10.  Mild anxiety  The patient reports frequent/daily "upset stomach".  He feels is his stress/anxiety related.  He never took the Xanax that I have prescribed several years ago.  I will start him now on Celexa.  Reassess in 8-12 weeks.  Hopefully he will be able to decrease his omeprazole intake in the future.  - citalopram (CELEXA) 10 MG tablet; Take 1 tablet (10 mg total) by mouth once daily.  Dispense: 90 tablet; Refill: 1    7. Overweight (BMI 25.0-29.9)  The patient is asked to make an attempt to improve diet and " exercise patterns to aid in medical management of this problem.     8. Colon cancer screening    - Case request GI: COLONOSCOPY    9. Elevated PSA  He will follow-up with urology next week.  - PSA, Screening; Future            Return in about 3 months (around 7/5/2017), or if symptoms worsen or fail to improve, for follow up chronic medical conditions.. or sooner as needed.         warm

## 2020-06-26 DIAGNOSIS — M85.859 OSTEOPENIA OF NECK OF FEMUR, UNSPECIFIED LATERALITY: ICD-10-CM

## 2020-06-29 RX ORDER — ALENDRONATE SODIUM 70 MG/1
70 TABLET ORAL
Qty: 12 TABLET | Refills: 2 | Status: SHIPPED | OUTPATIENT
Start: 2020-06-29 | End: 2020-09-09 | Stop reason: SDUPTHER

## 2020-06-29 NOTE — TELEPHONE ENCOUNTER
Spoke with patient follow up appointment scheduled 12/14/20 at 11:00 AM. Patient verbalized understanding.

## 2020-07-14 ENCOUNTER — TELEPHONE (OUTPATIENT)
Dept: FAMILY MEDICINE | Facility: CLINIC | Age: 70
End: 2020-07-14

## 2020-07-14 DIAGNOSIS — R91.1 SOLITARY PULMONARY NODULE: ICD-10-CM

## 2020-07-14 NOTE — TELEPHONE ENCOUNTER
Please call patient: I have placed an order for his 6 month follow up lung screening to follow up on the lung nodules.

## 2020-07-14 NOTE — TELEPHONE ENCOUNTER
----- Message from Janine Acuña MD sent at 1/14/2020  2:17 PM CST -----  Recheck CT of lungs to follow up on lung nodules.

## 2020-07-15 ENCOUNTER — HOSPITAL ENCOUNTER (OUTPATIENT)
Dept: RADIOLOGY | Facility: HOSPITAL | Age: 70
Discharge: HOME | End: 2020-07-15
Attending: INTERNAL MEDICINE
Payer: COMMERCIAL

## 2020-07-15 DIAGNOSIS — R91.1 SOLITARY PULMONARY NODULE: ICD-10-CM

## 2020-07-15 PROCEDURE — G0297 CT CHEST LUNG SCREENING LOW DOSE: ICD-10-PCS | Mod: 26,,, | Performed by: RADIOLOGY

## 2020-07-15 PROCEDURE — G0297 LDCT FOR LUNG CA SCREEN: HCPCS | Mod: 26,,, | Performed by: RADIOLOGY

## 2020-07-15 PROCEDURE — G0297 LDCT FOR LUNG CA SCREEN: HCPCS | Mod: TC

## 2020-08-25 ENCOUNTER — OFFICE VISIT (OUTPATIENT)
Dept: FAMILY MEDICINE | Facility: CLINIC | Age: 70
End: 2020-08-25
Payer: COMMERCIAL

## 2020-08-25 VITALS
BODY MASS INDEX: 27.92 KG/M2 | WEIGHT: 206.13 LBS | SYSTOLIC BLOOD PRESSURE: 110 MMHG | HEART RATE: 110 BPM | HEIGHT: 72 IN | DIASTOLIC BLOOD PRESSURE: 70 MMHG | OXYGEN SATURATION: 95 % | TEMPERATURE: 100 F

## 2020-08-25 DIAGNOSIS — K21.9 GASTROESOPHAGEAL REFLUX DISEASE WITHOUT ESOPHAGITIS: ICD-10-CM

## 2020-08-25 DIAGNOSIS — M85.859 OSTEOPENIA OF NECK OF FEMUR, UNSPECIFIED LATERALITY: ICD-10-CM

## 2020-08-25 DIAGNOSIS — H26.9 CATARACT, UNSPECIFIED CATARACT TYPE, UNSPECIFIED LATERALITY: ICD-10-CM

## 2020-08-25 DIAGNOSIS — Z01.818 PRE-OP EXAM: ICD-10-CM

## 2020-08-25 DIAGNOSIS — I10 ESSENTIAL HYPERTENSION: ICD-10-CM

## 2020-08-25 DIAGNOSIS — R00.0 TACHYCARDIA: Primary | ICD-10-CM

## 2020-08-25 DIAGNOSIS — I70.0 AORTIC ARCH ATHEROSCLEROSIS: ICD-10-CM

## 2020-08-25 PROCEDURE — 3074F PR MOST RECENT SYSTOLIC BLOOD PRESSURE < 130 MM HG: ICD-10-PCS | Mod: CPTII,S$GLB,, | Performed by: NURSE PRACTITIONER

## 2020-08-25 PROCEDURE — 99999 PR PBB SHADOW E&M-EST. PATIENT-LVL IV: CPT | Mod: PBBFAC,,, | Performed by: NURSE PRACTITIONER

## 2020-08-25 PROCEDURE — 93010 EKG 12-LEAD: ICD-10-PCS | Mod: S$GLB,,, | Performed by: INTERNAL MEDICINE

## 2020-08-25 PROCEDURE — 3074F SYST BP LT 130 MM HG: CPT | Mod: CPTII,S$GLB,, | Performed by: NURSE PRACTITIONER

## 2020-08-25 PROCEDURE — 3078F PR MOST RECENT DIASTOLIC BLOOD PRESSURE < 80 MM HG: ICD-10-PCS | Mod: CPTII,S$GLB,, | Performed by: NURSE PRACTITIONER

## 2020-08-25 PROCEDURE — 93005 EKG 12-LEAD: ICD-10-PCS | Mod: S$GLB,,, | Performed by: NURSE PRACTITIONER

## 2020-08-25 PROCEDURE — 1126F AMNT PAIN NOTED NONE PRSNT: CPT | Mod: S$GLB,,, | Performed by: NURSE PRACTITIONER

## 2020-08-25 PROCEDURE — 99214 PR OFFICE/OUTPT VISIT, EST, LEVL IV, 30-39 MIN: ICD-10-PCS | Mod: S$GLB,,, | Performed by: NURSE PRACTITIONER

## 2020-08-25 PROCEDURE — 93010 ELECTROCARDIOGRAM REPORT: CPT | Mod: S$GLB,,, | Performed by: INTERNAL MEDICINE

## 2020-08-25 PROCEDURE — 1159F MED LIST DOCD IN RCRD: CPT | Mod: S$GLB,,, | Performed by: NURSE PRACTITIONER

## 2020-08-25 PROCEDURE — 3008F PR BODY MASS INDEX (BMI) DOCUMENTED: ICD-10-PCS | Mod: CPTII,S$GLB,, | Performed by: NURSE PRACTITIONER

## 2020-08-25 PROCEDURE — 1159F PR MEDICATION LIST DOCUMENTED IN MEDICAL RECORD: ICD-10-PCS | Mod: S$GLB,,, | Performed by: NURSE PRACTITIONER

## 2020-08-25 PROCEDURE — 99999 PR PBB SHADOW E&M-EST. PATIENT-LVL IV: ICD-10-PCS | Mod: PBBFAC,,, | Performed by: NURSE PRACTITIONER

## 2020-08-25 PROCEDURE — 93005 ELECTROCARDIOGRAM TRACING: CPT | Mod: S$GLB,,, | Performed by: NURSE PRACTITIONER

## 2020-08-25 PROCEDURE — 3078F DIAST BP <80 MM HG: CPT | Mod: CPTII,S$GLB,, | Performed by: NURSE PRACTITIONER

## 2020-08-25 PROCEDURE — 1126F PR PAIN SEVERITY QUANTIFIED, NO PAIN PRESENT: ICD-10-PCS | Mod: S$GLB,,, | Performed by: NURSE PRACTITIONER

## 2020-08-25 PROCEDURE — 3008F BODY MASS INDEX DOCD: CPT | Mod: CPTII,S$GLB,, | Performed by: NURSE PRACTITIONER

## 2020-08-25 PROCEDURE — 99214 OFFICE O/P EST MOD 30 MIN: CPT | Mod: S$GLB,,, | Performed by: NURSE PRACTITIONER

## 2020-08-25 PROCEDURE — 1101F PT FALLS ASSESS-DOCD LE1/YR: CPT | Mod: CPTII,S$GLB,, | Performed by: NURSE PRACTITIONER

## 2020-08-25 PROCEDURE — 1101F PR PT FALLS ASSESS DOC 0-1 FALLS W/OUT INJ PAST YR: ICD-10-PCS | Mod: CPTII,S$GLB,, | Performed by: NURSE PRACTITIONER

## 2020-08-25 NOTE — PROGRESS NOTES
Subjective:       Patient ID: Vinnie Scott is a 70 y.o. male.    Chief Complaint: Pre-op Exam (Cataract surgery     DOS 08-31-20)    70-year-old male presents to the clinic today for preop on left eye cataract surgery to be done on August 31, 2020.  He denies any headaches, dizziness, or blurred vision.  He denies any cardiac chest pain, heart palpitations, shortness of breath, or swelling to lower extremities. He denies any use of Aspirin, Aspirin products, or herbal medications. He has not been on prolonged courses of steroids. He denies any history of problems or history of  family problems with anesthesia. He reports no history of blood clots or excessive bleeding. He reports good exercise tolerance. He just had his colonoscopy this past Thursday 8/20/2020 at [facility] and he ok them to send Dr. Acuña a report already. He denies any complaints today.       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    Mild anxiety     Overweight(278.02)     Personal history of colonic polyps October 2010, May 2015    Prediabetes     Urinary anastomotic stricture      Past Surgical History:   Procedure Laterality Date    ADENOIDECTOMY      COLONOSCOPY N/A 5/31/2017    Procedure: COLONOSCOPY;  Surgeon: Fatoumata Cook MD;  [facility];  Service: Endoscopy;  Laterality: N/A;    FRACTURE SURGERY      Left collarbone    left ankle fracture repair Left 12/2018    TONSILLECTOMY      urinary stricture clearing      VASECTOMY        reports that he quit smoking about 8 years ago. His smoking use included cigarettes. He has a 45.00 pack-year smoking history. He has  never used smokeless tobacco. He reports current alcohol use. He reports that he does not use drugs.  Review of Systems   Constitutional: Negative for activity change, chills and unexpected weight change.   HENT: Negative for congestion, ear discharge, ear pain, hearing loss, postnasal drip, rhinorrhea, sinus pressure, sinus pain, sore throat and trouble swallowing.    Eyes: Negative for discharge and visual disturbance.   Respiratory: Negative for cough, chest tightness and wheezing.    Cardiovascular: Negative for chest pain, palpitations and leg swelling.   Gastrointestinal: Negative for abdominal pain, blood in stool, constipation, diarrhea, nausea and vomiting.   Endocrine: Negative for polydipsia and polyuria.   Genitourinary: Negative for difficulty urinating, hematuria and urgency.   Musculoskeletal: Negative for arthralgias, gait problem, joint swelling and neck pain.   Neurological: Negative for dizziness, weakness, light-headedness and headaches.   Psychiatric/Behavioral: Negative for confusion and dysphoric mood.       Objective:      Physical Exam  Constitutional:       General: He is not in acute distress.     Appearance: He is well-developed. He is not diaphoretic.   HENT:      Head: Normocephalic and atraumatic.      Right Ear: External ear normal.      Left Ear: External ear normal.      Nose: Nose normal.      Mouth/Throat:      Pharynx: No oropharyngeal exudate.   Eyes:      General: No scleral icterus.        Right eye: No discharge.         Left eye: No discharge.      Conjunctiva/sclera: Conjunctivae normal.      Pupils: Pupils are equal, round, and reactive to light.   Neck:      Musculoskeletal: Normal range of motion and neck supple.      Thyroid: No thyromegaly.      Vascular: No JVD.      Comments: No bruits   Cardiovascular:      Rate and Rhythm: Normal rate and regular rhythm.      Heart sounds: Normal heart sounds. No murmur. No friction rub. No gallop.    Pulmonary:      Effort:  Pulmonary effort is normal. No respiratory distress.      Breath sounds: Normal breath sounds. No wheezing or rales.   Abdominal:      General: Bowel sounds are normal.      Palpations: Abdomen is soft.      Tenderness: There is no abdominal tenderness. There is no guarding or rebound.   Musculoskeletal: Normal range of motion.   Lymphadenopathy:      Cervical: No cervical adenopathy.   Skin:     General: Skin is warm and dry.      Findings: No rash.   Neurological:      Mental Status: He is alert and oriented to person, place, and time.      Deep Tendon Reflexes: Reflexes normal.   Psychiatric:         Behavior: Behavior normal.         Thought Content: Thought content normal.         Judgment: Judgment normal.         Assessment:       1. Tachycardia    2. Pre-op exam    3. Cataract, unspecified cataract type, unspecified laterality    4. Osteopenia of neck of femur, unspecified laterality    5. Essential hypertension    6. Gastroesophageal reflux disease without esophagitis    7. Aortic arch atherosclerosis        Plan:         Tachycardia  -     EKG 12-lead  - EKG showed normal sinus rhythm with a rate of 85     Pre-op exam  - cleared for cataract surgery    Cataract, unspecified cataract type, unspecified laterality  - left eye cataract surgery scheduled on 8/31/2020    Osteopenia of neck of femur, unspecified laterality  - on Fosamax    Essential hypertension  - The current medical regimen is effective;  continue present plan and medications.    Gastroesophageal reflux disease without esophagitis  - The current medical regimen is effective;  continue present plan and medications.    Aortic arch atherosclerosis  - Stable / Asymptomatic is on blood pressure medications and cholesterol close monitoring     This is a low cardiac risk patient that is medically maximized for a low risk cardiac surgery. He is cleared for surgery.

## 2020-09-03 ENCOUNTER — PATIENT OUTREACH (OUTPATIENT)
Dept: ADMINISTRATIVE | Facility: HOSPITAL | Age: 70
End: 2020-09-03

## 2020-09-03 RX ORDER — PANTOPRAZOLE SODIUM 40 MG/1
TABLET, DELAYED RELEASE ORAL
COMMUNITY
Start: 2020-08-20 | End: 2021-12-15

## 2020-09-03 RX ORDER — SODIUM, POTASSIUM,MAG SULFATES 17.5-3.13G
SOLUTION, RECONSTITUTED, ORAL ORAL
COMMUNITY
Start: 2020-07-22 | End: 2020-12-14

## 2020-09-03 RX ORDER — BROMFENAC SODIUM 0.7 MG/ML
SOLUTION/ DROPS OPHTHALMIC
COMMUNITY
Start: 2020-08-20 | End: 2020-12-14

## 2020-09-03 RX ORDER — OFLOXACIN 3 MG/ML
SOLUTION/ DROPS OPHTHALMIC
COMMUNITY
Start: 2020-08-19 | End: 2020-12-14

## 2020-09-03 RX ORDER — DUREZOL 0.5 MG/ML
EMULSION OPHTHALMIC
COMMUNITY
Start: 2020-08-19 | End: 2020-12-14

## 2020-09-09 ENCOUNTER — PATIENT OUTREACH (OUTPATIENT)
Dept: ADMINISTRATIVE | Facility: HOSPITAL | Age: 70
End: 2020-09-09

## 2020-09-09 DIAGNOSIS — I10 ESSENTIAL HYPERTENSION: ICD-10-CM

## 2020-09-09 DIAGNOSIS — F43.9 STRESS: ICD-10-CM

## 2020-09-09 RX ORDER — FLUTICASONE PROPIONATE 50 MCG
1 SPRAY, SUSPENSION (ML) NASAL DAILY
Qty: 48 G | Refills: 1 | Status: SHIPPED | OUTPATIENT
Start: 2020-09-09 | End: 2020-12-14 | Stop reason: SDUPTHER

## 2020-09-09 RX ORDER — LOSARTAN POTASSIUM 100 MG/1
100 TABLET ORAL DAILY
Qty: 90 TABLET | Refills: 1 | Status: SHIPPED | OUTPATIENT
Start: 2020-09-09 | End: 2020-12-14 | Stop reason: SDUPTHER

## 2020-09-09 RX ORDER — CITALOPRAM 10 MG/1
10 TABLET ORAL DAILY
Qty: 90 TABLET | Refills: 1 | Status: SHIPPED | OUTPATIENT
Start: 2020-09-09 | End: 2020-12-14 | Stop reason: SDUPTHER

## 2020-09-09 RX ORDER — AMLODIPINE BESYLATE 5 MG/1
TABLET ORAL
Qty: 90 TABLET | Refills: 1 | Status: SHIPPED | OUTPATIENT
Start: 2020-09-09 | End: 2020-12-14 | Stop reason: SDUPTHER

## 2020-09-24 ENCOUNTER — OFFICE VISIT (OUTPATIENT)
Dept: FAMILY MEDICINE | Facility: CLINIC | Age: 70
End: 2020-09-24
Payer: COMMERCIAL

## 2020-09-24 VITALS
BODY MASS INDEX: 28.77 KG/M2 | SYSTOLIC BLOOD PRESSURE: 132 MMHG | HEIGHT: 72 IN | WEIGHT: 212.44 LBS | DIASTOLIC BLOOD PRESSURE: 70 MMHG | HEART RATE: 107 BPM | OXYGEN SATURATION: 95 % | TEMPERATURE: 99 F

## 2020-09-24 DIAGNOSIS — E55.9 VITAMIN D DEFICIENCY: ICD-10-CM

## 2020-09-24 DIAGNOSIS — I10 ESSENTIAL HYPERTENSION: ICD-10-CM

## 2020-09-24 DIAGNOSIS — D64.9 ANEMIA, UNSPECIFIED TYPE: ICD-10-CM

## 2020-09-24 DIAGNOSIS — E53.8 B12 DEFICIENCY: ICD-10-CM

## 2020-09-24 DIAGNOSIS — I70.0 AORTIC ARCH ATHEROSCLEROSIS: ICD-10-CM

## 2020-09-24 DIAGNOSIS — Z01.818 PRE-OP EXAM: Primary | ICD-10-CM

## 2020-09-24 DIAGNOSIS — K21.9 GASTROESOPHAGEAL REFLUX DISEASE WITHOUT ESOPHAGITIS: ICD-10-CM

## 2020-09-24 DIAGNOSIS — E61.1 IRON DEFICIENCY: ICD-10-CM

## 2020-09-24 DIAGNOSIS — M85.859 OSTEOPENIA OF NECK OF FEMUR, UNSPECIFIED LATERALITY: ICD-10-CM

## 2020-09-24 DIAGNOSIS — R73.03 PREDIABETES: ICD-10-CM

## 2020-09-24 DIAGNOSIS — H26.9 CATARACT, UNSPECIFIED CATARACT TYPE, UNSPECIFIED LATERALITY: ICD-10-CM

## 2020-09-24 PROCEDURE — 1101F PR PT FALLS ASSESS DOC 0-1 FALLS W/OUT INJ PAST YR: ICD-10-PCS | Mod: CPTII,S$GLB,, | Performed by: NURSE PRACTITIONER

## 2020-09-24 PROCEDURE — 99999 PR PBB SHADOW E&M-EST. PATIENT-LVL IV: CPT | Mod: PBBFAC,,, | Performed by: NURSE PRACTITIONER

## 2020-09-24 PROCEDURE — 99214 PR OFFICE/OUTPT VISIT, EST, LEVL IV, 30-39 MIN: ICD-10-PCS | Mod: S$GLB,,, | Performed by: NURSE PRACTITIONER

## 2020-09-24 PROCEDURE — 1126F PR PAIN SEVERITY QUANTIFIED, NO PAIN PRESENT: ICD-10-PCS | Mod: S$GLB,,, | Performed by: NURSE PRACTITIONER

## 2020-09-24 PROCEDURE — 99999 PR PBB SHADOW E&M-EST. PATIENT-LVL IV: ICD-10-PCS | Mod: PBBFAC,,, | Performed by: NURSE PRACTITIONER

## 2020-09-24 PROCEDURE — 99214 OFFICE O/P EST MOD 30 MIN: CPT | Mod: S$GLB,,, | Performed by: NURSE PRACTITIONER

## 2020-09-24 PROCEDURE — 1101F PT FALLS ASSESS-DOCD LE1/YR: CPT | Mod: CPTII,S$GLB,, | Performed by: NURSE PRACTITIONER

## 2020-09-24 PROCEDURE — 1126F AMNT PAIN NOTED NONE PRSNT: CPT | Mod: S$GLB,,, | Performed by: NURSE PRACTITIONER

## 2020-09-24 PROCEDURE — 1159F MED LIST DOCD IN RCRD: CPT | Mod: S$GLB,,, | Performed by: NURSE PRACTITIONER

## 2020-09-24 PROCEDURE — 3078F DIAST BP <80 MM HG: CPT | Mod: CPTII,S$GLB,, | Performed by: NURSE PRACTITIONER

## 2020-09-24 PROCEDURE — 3075F PR MOST RECENT SYSTOLIC BLOOD PRESS GE 130-139MM HG: ICD-10-PCS | Mod: CPTII,S$GLB,, | Performed by: NURSE PRACTITIONER

## 2020-09-24 PROCEDURE — 1159F PR MEDICATION LIST DOCUMENTED IN MEDICAL RECORD: ICD-10-PCS | Mod: S$GLB,,, | Performed by: NURSE PRACTITIONER

## 2020-09-24 PROCEDURE — 3078F PR MOST RECENT DIASTOLIC BLOOD PRESSURE < 80 MM HG: ICD-10-PCS | Mod: CPTII,S$GLB,, | Performed by: NURSE PRACTITIONER

## 2020-09-24 PROCEDURE — 3008F BODY MASS INDEX DOCD: CPT | Mod: CPTII,S$GLB,, | Performed by: NURSE PRACTITIONER

## 2020-09-24 PROCEDURE — 3008F PR BODY MASS INDEX (BMI) DOCUMENTED: ICD-10-PCS | Mod: CPTII,S$GLB,, | Performed by: NURSE PRACTITIONER

## 2020-09-24 PROCEDURE — 3075F SYST BP GE 130 - 139MM HG: CPT | Mod: CPTII,S$GLB,, | Performed by: NURSE PRACTITIONER

## 2020-09-24 NOTE — PROGRESS NOTES
Subjective:       Patient ID: Vinnie Scott is a 70 y.o. male.    Chief Complaint: Pre-op Exam (Right eye cataract surgery    DOS   10/12/20)    70-year-old male presents to the clinic today for preop for cataract surgery done on 10/12/2020 on his right eye.  He denies any headaches, dizziness, or blurred vision.  He denies any cardiac chest pain, heart palpitations, shortness of breath, or swelling to lower extremity.  He denies any specific complaints today.    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    Mild anxiety     Overweight(278.02)     Personal history of colonic polyps October 2010, May 2015    Prediabetes     Urinary anastomotic stricture      Past Surgical History:   Procedure Laterality Date    ADENOIDECTOMY      COLONOSCOPY N/A 5/31/2017    Procedure: COLONOSCOPY;  Surgeon: Fatoumata Cook MD;  Location: [facility];  Service: Endoscopy;  Laterality: N/A;    FRACTURE SURGERY      Left collarbone    left ankle fracture repair Left 12/2018    TONSILLECTOMY      urinary stricture clearing      VASECTOMY        reports that he quit smoking about 9 years ago. His smoking use included cigarettes. He has a 45.00 pack-year smoking history. He has never used smokeless tobacco. He reports current alcohol use. He reports that he does not use drugs.  Review of Systems   Constitutional: Negative for chills, fatigue and fever.   HENT: Negative for congestion, ear discharge, ear pain, nosebleeds, postnasal drip, rhinorrhea, sinus pressure, sneezing and sore throat.    Respiratory: Negative for cough, shortness of breath and wheezing.    Cardiovascular: Negative  for chest pain, palpitations and leg swelling.   Gastrointestinal: Negative for abdominal pain, constipation, diarrhea, nausea and vomiting.   Musculoskeletal: Negative for back pain, gait problem and neck pain.   Skin: Negative for color change and rash.   Neurological: Negative for dizziness, light-headedness and headaches.       Objective:      Physical Exam  Constitutional:       General: He is not in acute distress.     Appearance: He is well-developed. He is not diaphoretic.   HENT:      Head: Normocephalic and atraumatic.      Right Ear: External ear normal.      Left Ear: External ear normal.      Nose: Nose normal.      Mouth/Throat:      Pharynx: No oropharyngeal exudate.   Eyes:      General: No scleral icterus.        Right eye: No discharge.         Left eye: No discharge.      Conjunctiva/sclera: Conjunctivae normal.      Pupils: Pupils are equal, round, and reactive to light.   Neck:      Musculoskeletal: Normal range of motion and neck supple.      Thyroid: No thyromegaly.      Vascular: No JVD.      Comments: No bruits   Cardiovascular:      Rate and Rhythm: Normal rate and regular rhythm.      Heart sounds: No murmur. No friction rub. No gallop.    Pulmonary:      Effort: Pulmonary effort is normal. No respiratory distress.      Breath sounds: Normal breath sounds. No wheezing or rales.   Abdominal:      General: Bowel sounds are normal.      Palpations: Abdomen is soft.      Tenderness: There is no abdominal tenderness. There is no guarding or rebound.   Musculoskeletal: Normal range of motion.   Lymphadenopathy:      Cervical: No cervical adenopathy.   Skin:     General: Skin is warm and dry.      Findings: Rash present.      Comments: Rosacea noted to face   Neurological:      Mental Status: He is alert and oriented to person, place, and time.      Deep Tendon Reflexes: Reflexes normal.   Psychiatric:         Behavior: Behavior normal.         Thought Content: Thought content normal.          Judgment: Judgment normal.         Assessment:       1. Pre-op exam    2. Cataract, unspecified cataract type, unspecified laterality    3. Essential hypertension    4. Osteopenia of neck of femur, unspecified laterality    5. Gastroesophageal reflux disease without esophagitis    6. Aortic arch atherosclerosis    7. B12 deficiency    8. Iron deficiency    9. Vitamin D deficiency    10. Prediabetes    11. Anemia, unspecified type        Plan:         Pre-op exam  - cleared for cataract surgery on right eye     Cataract, unspecified cataract type, unspecified laterality    Essential hypertension  - The current medical regimen is effective;  continue present plan and medications.    Osteopenia of neck of femur, unspecified laterality  - The current medical regimen is effective;  continue present plan and medications.    Gastroesophageal reflux disease without esophagitis  - The current medical regimen is effective;  continue present plan and medications.    Aortic arch atherosclerosis  - Stable / Asymptomatic is on blood pressure lowering medication and monitor cholesterol closely    B12 deficiency    Iron deficiency    Vitamin D deficiency    Prediabetes  - avoid excessive sugars and carbohydrates     Anemia, unspecified type  - stable continue to monitor       No follow-ups on file.

## 2020-12-04 DIAGNOSIS — I10 ESSENTIAL HYPERTENSION: ICD-10-CM

## 2020-12-08 DIAGNOSIS — I10 ESSENTIAL HYPERTENSION: Primary | ICD-10-CM

## 2020-12-08 DIAGNOSIS — R73.03 PREDIABETES: ICD-10-CM

## 2020-12-08 DIAGNOSIS — Z12.5 PROSTATE CANCER SCREENING: ICD-10-CM

## 2020-12-10 ENCOUNTER — TELEPHONE (OUTPATIENT)
Dept: FAMILY MEDICINE | Facility: CLINIC | Age: 70
End: 2020-12-10

## 2020-12-10 NOTE — TELEPHONE ENCOUNTER
----- Message from Janine Acuña MD sent at 12/8/2020  5:25 PM CST -----  Please call patient to schedule labs prior to next office visit.

## 2020-12-11 ENCOUNTER — LAB VISIT (OUTPATIENT)
Dept: LAB | Facility: HOSPITAL | Age: 70
End: 2020-12-11
Attending: INTERNAL MEDICINE
Payer: COMMERCIAL

## 2020-12-11 DIAGNOSIS — Z12.5 PROSTATE CANCER SCREENING: ICD-10-CM

## 2020-12-11 DIAGNOSIS — I10 ESSENTIAL HYPERTENSION: ICD-10-CM

## 2020-12-11 DIAGNOSIS — R73.03 PREDIABETES: ICD-10-CM

## 2020-12-11 LAB
ALBUMIN SERPL BCP-MCNC: 3.9 G/DL (ref 3.5–5.2)
ALP SERPL-CCNC: 66 U/L (ref 55–135)
ALT SERPL W/O P-5'-P-CCNC: 14 U/L (ref 10–44)
ANION GAP SERPL CALC-SCNC: 13 MMOL/L (ref 8–16)
AST SERPL-CCNC: 17 U/L (ref 10–40)
BILIRUB SERPL-MCNC: 0.4 MG/DL (ref 0.1–1)
BUN SERPL-MCNC: 16 MG/DL (ref 8–23)
CALCIUM SERPL-MCNC: 8.9 MG/DL (ref 8.7–10.5)
CHLORIDE SERPL-SCNC: 104 MMOL/L (ref 95–110)
CHOLEST SERPL-MCNC: 190 MG/DL (ref 120–199)
CHOLEST/HDLC SERPL: 3.7 {RATIO} (ref 2–5)
CO2 SERPL-SCNC: 22 MMOL/L (ref 23–29)
CREAT SERPL-MCNC: 1 MG/DL (ref 0.5–1.4)
EST. GFR  (AFRICAN AMERICAN): >60 ML/MIN/1.73 M^2
EST. GFR  (NON AFRICAN AMERICAN): >60 ML/MIN/1.73 M^2
ESTIMATED AVG GLUCOSE: 114 MG/DL (ref 68–131)
GLUCOSE SERPL-MCNC: 91 MG/DL (ref 70–110)
HBA1C MFR BLD HPLC: 5.6 % (ref 4–5.6)
HDLC SERPL-MCNC: 52 MG/DL (ref 40–75)
HDLC SERPL: 27.4 % (ref 20–50)
LDLC SERPL CALC-MCNC: 126.4 MG/DL (ref 63–159)
NONHDLC SERPL-MCNC: 138 MG/DL
POTASSIUM SERPL-SCNC: 4.1 MMOL/L (ref 3.5–5.1)
PROT SERPL-MCNC: 7.6 G/DL (ref 6–8.4)
SODIUM SERPL-SCNC: 139 MMOL/L (ref 136–145)
TRIGL SERPL-MCNC: 58 MG/DL (ref 30–150)

## 2020-12-11 PROCEDURE — 80053 COMPREHEN METABOLIC PANEL: CPT

## 2020-12-11 PROCEDURE — 36415 COLL VENOUS BLD VENIPUNCTURE: CPT | Mod: PO

## 2020-12-11 PROCEDURE — 80061 LIPID PANEL: CPT

## 2020-12-11 PROCEDURE — 83036 HEMOGLOBIN GLYCOSYLATED A1C: CPT

## 2020-12-11 PROCEDURE — 84153 ASSAY OF PSA TOTAL: CPT

## 2020-12-12 LAB — COMPLEXED PSA SERPL-MCNC: 1.7 NG/ML (ref 0–4)

## 2020-12-14 ENCOUNTER — OFFICE VISIT (OUTPATIENT)
Dept: FAMILY MEDICINE | Facility: CLINIC | Age: 70
End: 2020-12-14
Payer: COMMERCIAL

## 2020-12-14 VITALS
TEMPERATURE: 98 F | HEIGHT: 72 IN | DIASTOLIC BLOOD PRESSURE: 60 MMHG | OXYGEN SATURATION: 96 % | BODY MASS INDEX: 28.8 KG/M2 | SYSTOLIC BLOOD PRESSURE: 112 MMHG | HEART RATE: 65 BPM | WEIGHT: 212.63 LBS

## 2020-12-14 DIAGNOSIS — I10 ESSENTIAL HYPERTENSION: ICD-10-CM

## 2020-12-14 DIAGNOSIS — I70.0 AORTIC ARCH ATHEROSCLEROSIS: ICD-10-CM

## 2020-12-14 DIAGNOSIS — R79.89 ELEVATED LFTS: ICD-10-CM

## 2020-12-14 DIAGNOSIS — Z00.00 ROUTINE MEDICAL EXAM: Primary | ICD-10-CM

## 2020-12-14 DIAGNOSIS — Z71.89 ADVANCED DIRECTIVES, COUNSELING/DISCUSSION: ICD-10-CM

## 2020-12-14 DIAGNOSIS — I73.9 ASYMPTOMATIC PVD (PERIPHERAL VASCULAR DISEASE): ICD-10-CM

## 2020-12-14 DIAGNOSIS — Z23 FLU VACCINE NEED: ICD-10-CM

## 2020-12-14 DIAGNOSIS — R05.9 COUGH: ICD-10-CM

## 2020-12-14 DIAGNOSIS — J98.6 ELEVATED HEMIDIAPHRAGM: ICD-10-CM

## 2020-12-14 DIAGNOSIS — E66.3 OVERWEIGHT (BMI 25.0-29.9): ICD-10-CM

## 2020-12-14 DIAGNOSIS — F41.9 MILD ANXIETY: ICD-10-CM

## 2020-12-14 DIAGNOSIS — K21.9 GASTROESOPHAGEAL REFLUX DISEASE WITHOUT ESOPHAGITIS: ICD-10-CM

## 2020-12-14 DIAGNOSIS — Z23 NEED FOR SHINGLES VACCINE: ICD-10-CM

## 2020-12-14 DIAGNOSIS — M85.859 OSTEOPENIA OF NECK OF FEMUR, UNSPECIFIED LATERALITY: ICD-10-CM

## 2020-12-14 DIAGNOSIS — J30.9 ALLERGIC RHINITIS, UNSPECIFIED SEASONALITY, UNSPECIFIED TRIGGER: ICD-10-CM

## 2020-12-14 DIAGNOSIS — R73.03 PREDIABETES: ICD-10-CM

## 2020-12-14 PROCEDURE — 3288F PR FALLS RISK ASSESSMENT DOCUMENTED: ICD-10-PCS | Mod: CPTII,S$GLB,, | Performed by: INTERNAL MEDICINE

## 2020-12-14 PROCEDURE — 90471 IMMUNIZATION ADMIN: CPT | Mod: S$GLB,,, | Performed by: INTERNAL MEDICINE

## 2020-12-14 PROCEDURE — 90472 IMMUNIZATION ADMIN EACH ADD: CPT | Mod: S$GLB,,, | Performed by: INTERNAL MEDICINE

## 2020-12-14 PROCEDURE — 3008F BODY MASS INDEX DOCD: CPT | Mod: CPTII,S$GLB,, | Performed by: INTERNAL MEDICINE

## 2020-12-14 PROCEDURE — 99397 PR PREVENTIVE VISIT,EST,65 & OVER: ICD-10-PCS | Mod: 25,S$GLB,, | Performed by: INTERNAL MEDICINE

## 2020-12-14 PROCEDURE — 90750 ZOSTER RECOMBINANT VACCINE: ICD-10-PCS | Mod: S$GLB,,, | Performed by: INTERNAL MEDICINE

## 2020-12-14 PROCEDURE — 1101F PR PT FALLS ASSESS DOC 0-1 FALLS W/OUT INJ PAST YR: ICD-10-PCS | Mod: CPTII,S$GLB,, | Performed by: INTERNAL MEDICINE

## 2020-12-14 PROCEDURE — 90694 FLU VACCINE - QUADRIVALENT - ADJUVANTED: ICD-10-PCS | Mod: S$GLB,,, | Performed by: INTERNAL MEDICINE

## 2020-12-14 PROCEDURE — 99999 PR PBB SHADOW E&M-EST. PATIENT-LVL IV: ICD-10-PCS | Mod: PBBFAC,,, | Performed by: INTERNAL MEDICINE

## 2020-12-14 PROCEDURE — 1101F PT FALLS ASSESS-DOCD LE1/YR: CPT | Mod: CPTII,S$GLB,, | Performed by: INTERNAL MEDICINE

## 2020-12-14 PROCEDURE — 3074F SYST BP LT 130 MM HG: CPT | Mod: CPTII,S$GLB,, | Performed by: INTERNAL MEDICINE

## 2020-12-14 PROCEDURE — 90471 FLU VACCINE - QUADRIVALENT - ADJUVANTED: ICD-10-PCS | Mod: S$GLB,,, | Performed by: INTERNAL MEDICINE

## 2020-12-14 PROCEDURE — 3008F PR BODY MASS INDEX (BMI) DOCUMENTED: ICD-10-PCS | Mod: CPTII,S$GLB,, | Performed by: INTERNAL MEDICINE

## 2020-12-14 PROCEDURE — 1126F PR PAIN SEVERITY QUANTIFIED, NO PAIN PRESENT: ICD-10-PCS | Mod: S$GLB,,, | Performed by: INTERNAL MEDICINE

## 2020-12-14 PROCEDURE — 3288F FALL RISK ASSESSMENT DOCD: CPT | Mod: CPTII,S$GLB,, | Performed by: INTERNAL MEDICINE

## 2020-12-14 PROCEDURE — 3078F DIAST BP <80 MM HG: CPT | Mod: CPTII,S$GLB,, | Performed by: INTERNAL MEDICINE

## 2020-12-14 PROCEDURE — 3074F PR MOST RECENT SYSTOLIC BLOOD PRESSURE < 130 MM HG: ICD-10-PCS | Mod: CPTII,S$GLB,, | Performed by: INTERNAL MEDICINE

## 2020-12-14 PROCEDURE — 90750 HZV VACC RECOMBINANT IM: CPT | Mod: S$GLB,,, | Performed by: INTERNAL MEDICINE

## 2020-12-14 PROCEDURE — 99999 PR PBB SHADOW E&M-EST. PATIENT-LVL IV: CPT | Mod: PBBFAC,,, | Performed by: INTERNAL MEDICINE

## 2020-12-14 PROCEDURE — 1126F AMNT PAIN NOTED NONE PRSNT: CPT | Mod: S$GLB,,, | Performed by: INTERNAL MEDICINE

## 2020-12-14 PROCEDURE — 90472 ZOSTER RECOMBINANT VACCINE: ICD-10-PCS | Mod: S$GLB,,, | Performed by: INTERNAL MEDICINE

## 2020-12-14 PROCEDURE — 3078F PR MOST RECENT DIASTOLIC BLOOD PRESSURE < 80 MM HG: ICD-10-PCS | Mod: CPTII,S$GLB,, | Performed by: INTERNAL MEDICINE

## 2020-12-14 PROCEDURE — 99397 PER PM REEVAL EST PAT 65+ YR: CPT | Mod: 25,S$GLB,, | Performed by: INTERNAL MEDICINE

## 2020-12-14 PROCEDURE — 90694 VACC AIIV4 NO PRSRV 0.5ML IM: CPT | Mod: S$GLB,,, | Performed by: INTERNAL MEDICINE

## 2020-12-14 RX ORDER — FLUTICASONE PROPIONATE 50 MCG
1 SPRAY, SUSPENSION (ML) NASAL DAILY
Qty: 48 G | Refills: 1 | Status: SHIPPED | OUTPATIENT
Start: 2020-12-14 | End: 2021-03-09 | Stop reason: SDUPTHER

## 2020-12-14 RX ORDER — ATORVASTATIN CALCIUM 10 MG/1
10 TABLET, FILM COATED ORAL DAILY
Qty: 90 TABLET | Refills: 3 | Status: SHIPPED | OUTPATIENT
Start: 2020-12-14 | End: 2021-03-09 | Stop reason: SDUPTHER

## 2020-12-14 RX ORDER — CITALOPRAM 10 MG/1
10 TABLET ORAL DAILY
Qty: 90 TABLET | Refills: 1 | Status: SHIPPED | OUTPATIENT
Start: 2020-12-14 | End: 2021-03-09 | Stop reason: SDUPTHER

## 2020-12-14 RX ORDER — AMLODIPINE BESYLATE 5 MG/1
TABLET ORAL
Qty: 90 TABLET | Refills: 1 | Status: SHIPPED | OUTPATIENT
Start: 2020-12-14 | End: 2021-03-09 | Stop reason: SDUPTHER

## 2020-12-14 RX ORDER — ZOLEDRONIC ACID 5 MG/100ML
5 INJECTION, SOLUTION INTRAVENOUS
Status: CANCELLED | OUTPATIENT
Start: 2020-12-14

## 2020-12-14 RX ORDER — LOSARTAN POTASSIUM 100 MG/1
100 TABLET ORAL DAILY
Qty: 90 TABLET | Refills: 1 | Status: SHIPPED | OUTPATIENT
Start: 2020-12-14 | End: 2021-03-09 | Stop reason: SDUPTHER

## 2020-12-14 RX ORDER — HEPARIN 100 UNIT/ML
500 SYRINGE INTRAVENOUS
Status: CANCELLED | OUTPATIENT
Start: 2020-12-14

## 2020-12-14 RX ORDER — SODIUM CHLORIDE 0.9 % (FLUSH) 0.9 %
10 SYRINGE (ML) INJECTION
Status: CANCELLED | OUTPATIENT
Start: 2020-12-14

## 2020-12-14 NOTE — PROGRESS NOTES
HISTORY OF PRESENT ILLNESS:  Vinnie Scott is a 70 y.o. male who presents to the clinic today for a routine medical physical exam. His last physical exam was approximately 1 years(s) ago.      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    Mild anxiety     Overweight(278.02)     Personal history of colonic polyps 2010, May 2015    Prediabetes     Urinary anastomotic stricture        PAST SURGICAL HISTORY:  Past Surgical History:   Procedure Laterality Date    ADENOIDECTOMY      COLONOSCOPY N/A 2017    Procedure: COLONOSCOPY;  Surgeon: Fatoumata Cook MD;  Location: [facility];  Service: Endoscopy;  Laterality: N/A;    FRACTURE SURGERY      Left collarbone    left ankle fracture repair Left 2018    TONSILLECTOMY      urinary stricture clearing      VASECTOMY         SOCIAL HISTORY:  Social History     Socioeconomic History    Marital status:      Spouse name: Not on file    Number of children: 2    Years of education: Not on file    Highest education level: Not on file   Occupational History    Occupation: [employer]     Employer: [employer]   Tobacco Use    Smoking status: Former Smoker     Packs/day: 1.00     Years: 45.00     Pack years: 45.00     Types: Cigarettes     Quit date: 2011     Years since quittin.4    Smokeless tobacco: Never Used   Substance and Sexual Activity    Alcohol use: Yes     Comment: 4-5 beers daily    Drug use: No    Sexual activity: Not on file   Other Topics Concern    Not on file   Social History Narrative    Not on file     Social  Determinants of Health     Financial Resource Strain:     Difficulty of Paying Living Expenses:    Food Insecurity:     Worried About Running Out of Food in the Last Year:     Ran Out of Food in the Last Year:    Transportation Needs:     Lack of Transportation (Medical):     Lack of Transportation (Non-Medical):    Physical Activity:     Days of Exercise per Week:     Minutes of Exercise per Session:    Stress:     Feeling of Stress :    Social Connections:     Frequency of Communication with Friends and Family:     Frequency of Social Gatherings with Friends and Family:     Attends Mosque Services:     Active Member of Clubs or Organizations:     Attends Club or Organization Meetings:     Marital Status:        FAMILY HISTORY:  Family History   Problem Relation Age of Onset    Lung cancer Father     Diabetes Father     Uterine cancer Mother     Heart disease Mother     Drug abuse Brother     Alcohol abuse Brother     Prostate cancer Neg Hx     Colon cancer Neg Hx        ALLERGIES AND MEDICATIONS: updated and reviewed.  Review of patient's allergies indicates:   Allergen Reactions    Ciprofloxacin      Other reaction(s): Muscle pain    Lisinopril Other (See Comments)     cough     Medication List with Changes/Refills   New Medications    ATORVASTATIN (LIPITOR) 10 MG TABLET    Take 1 tablet (10 mg total) by mouth once daily.   Current Medications    ALENDRONATE (FOSAMAX) 70 MG TABLET    Take 1 tablet (70 mg total) by mouth every 7 days.    FEXOFENADINE (ALLEGRA) 180 MG TABLET    Take 1 tablet (180 mg total) by mouth once daily.    METRONIDAZOLE 1% (METROGEL) 1 % GEL    1 application once daily. Apply to affected area    PANTOPRAZOLE (PROTONIX) 40 MG TABLET    TK 1 T PO QAM   Changed and/or Refilled Medications    Modified Medication Previous Medication    AMLODIPINE (NORVASC) 5 MG TABLET amLODIPine (NORVASC) 5 MG tablet       TAKE 1 TABLET(5 MG) BY MOUTH EVERY DAY    TAKE 1 TABLET(5 MG) BY  MOUTH EVERY DAY    CITALOPRAM (CELEXA) 10 MG TABLET citalopram (CELEXA) 10 MG tablet       Take 1 tablet (10 mg total) by mouth once daily.    Take 1 tablet (10 mg total) by mouth once daily.    FLUTICASONE PROPIONATE (FLONASE) 50 MCG/ACTUATION NASAL SPRAY fluticasone propionate (FLONASE) 50 mcg/actuation nasal spray       1 spray (50 mcg total) by Each Nostril route once daily.    1 spray (50 mcg total) by Each Nostril route once daily.    LOSARTAN (COZAAR) 100 MG TABLET losartan (COZAAR) 100 MG tablet       Take 1 tablet (100 mg total) by mouth once daily.    Take 1 tablet (100 mg total) by mouth once daily.   Discontinued Medications    DUREZOL 0.05 % DROP OPHTHALMIC SOLUTION    INT 1 GTT IN OS QID UTD    OFLOXACIN (OCUFLOX) 0.3 % OPHTHALMIC SOLUTION    INT 1 GTT IN OS QID    OMEPRAZOLE (PRILOSEC) 10 MG CAPSULE    Take 10 mg by mouth.    PROLENSA 0.07 % DROP    INT 1 GTT IN OS QD    SUPREP BOWEL PREP KIT 17.5-3.13-1.6 GRAM SOLR    MX AND DRK UTD         CARE TEAM:  Patient Care Team:  Janine Acuña MD as PCP - General (Internal Medicine)  Eligio Culp Jr., MD as Consulting Physician (Urology)  Carmelita Moreno LPN as Licensed Practical Nurse  Tacho Carroll MD as Consulting Physician (Gastroenterology)           SCREENING HISTORY:  Health Maintenance       Date Due Completion Date    Shingles Vaccine (1 of 2) 05/04/2002 ---    Influenza Vaccine (1) 08/01/2020 12/2/2019    LDCT Lung Screen 07/15/2021 7/15/2020    PROSTATE-SPECIFIC ANTIGEN 12/11/2021 12/11/2020    Lipid Panel 12/11/2021 12/11/2020    Hemoglobin A1c 12/11/2021 12/11/2020    High Dose Statin 12/14/2021 12/14/2020    DEXA SCAN 01/02/2022 1/2/2020    Colorectal Cancer Screening 08/20/2023 8/20/2020    TETANUS VACCINE 04/13/2025 4/13/2015            REVIEW OF SYSTEMS:   The patient reports: fair dietary habits.  The patient reports : that they do not exercise, but stay active.  Review of Systems   Constitutional: Negative for chills, fatigue,  fever and unexpected weight change.   HENT: Negative for congestion, ear pain, sore throat and voice change.    Eyes: Negative for photophobia, pain, discharge and visual disturbance.   Respiratory: Positive for cough (- chronic; has had full GI workup). Negative for shortness of breath and wheezing.    Cardiovascular: Negative for chest pain, palpitations and leg swelling.   Gastrointestinal: Negative for abdominal pain, blood in stool, constipation, diarrhea, nausea and vomiting.   Genitourinary: Negative for dysuria and frequency.   Musculoskeletal: Positive for arthralgias (- mild; chronic). Negative for gait problem, joint swelling and neck stiffness.   Skin: Negative for color change and rash.   Neurological: Negative for seizures, weakness and headaches.   Hematological: Negative for adenopathy. Does not bruise/bleed easily.   Psychiatric/Behavioral: Negative for behavioral problems and dysphoric mood. The patient is not nervous/anxious.      ROS : patient denies: difficulty initiating urination          PHYSICAL EXAM:  Vitals:    12/14/20 1120   BP: 112/60   Pulse: 65   Temp: 98.1 °F (36.7 °C)     Weight: 96.5 kg (212 lb 10.1 oz)   Height: 6' (182.9 cm)   Body mass index is 28.84 kg/m².    General appearance - alert, well appearing, and in no distress, overweight  Psychiatric - alert, oriented to person, place, and time, normal mood, behavior, speech, dress, motor activity, and thought processes  Eyes - pupils equal and reactive, extraocular eye movements intact, sclera anicteric  Mouth - not examined; patient wearing mask due to Covid 19 pandemic  Neck - supple, no significant adenopathy, carotids upstroke normal bilaterally, no bruits, thyroid exam: thyroid is normal in size without nodules or tenderness  Lymphatics - no palpable cervical lymphadenopathy  Chest - clear to auscultation, no wheezes, rales or rhonchi, symmetric air entry  Heart - normal rate and regular rhythm, no gallops noted  Abdomen -  soft, nontender, nondistended, no masses or organomegaly   Male - not examined  Back exam - full range of motion, no tenderness, palpable spasm or pain on motion, in depth exam deferred  Neurological - alert, normal speech, no focal findings or movement disorder noted, cranial nerves II through XII intact  Musculoskeletal - patient noted to have Mild osteoarthritic changes to both knee joints. No joint effusions noted., no muscular tenderness noted  Extremities - peripheral pulses normal, no pedal edema, no clubbing or cyanosis  Skin - normal coloration and turgor, no rashes, no suspicious skin lesions noted      Labs:  Results for orders placed or performed in visit on 12/11/20   Comprehensive Metabolic Panel   Result Value Ref Range    Sodium 139 136 - 145 mmol/L    Potassium 4.1 3.5 - 5.1 mmol/L    Chloride 104 95 - 110 mmol/L    CO2 22 (L) 23 - 29 mmol/L    Glucose 91 70 - 110 mg/dL    BUN 16 8 - 23 mg/dL    Creatinine 1.0 0.5 - 1.4 mg/dL    Calcium 8.9 8.7 - 10.5 mg/dL    Total Protein 7.6 6.0 - 8.4 g/dL    Albumin 3.9 3.5 - 5.2 g/dL    Total Bilirubin 0.4 0.1 - 1.0 mg/dL    Alkaline Phosphatase 66 55 - 135 U/L    AST 17 10 - 40 U/L    ALT 14 10 - 44 U/L    Anion Gap 13 8 - 16 mmol/L    eGFR if African American >60.0 >60 mL/min/1.73 m^2    eGFR if non African American >60.0 >60 mL/min/1.73 m^2   Lipid Panel   Result Value Ref Range    Cholesterol 190 120 - 199 mg/dL    Triglycerides 58 30 - 150 mg/dL    HDL 52 40 - 75 mg/dL    LDL Cholesterol 126.4 63 - 159 mg/dL    HDL/Cholesterol Ratio 27.4 20.0 - 50.0 %    Total Cholesterol/HDL Ratio 3.7 2.0 - 5.0    Non-HDL Cholesterol 138 mg/dL   PSA, Screening   Result Value Ref Range    PSA, Screen 1.7 0.00 - 4.00 ng/mL   Hemoglobin A1C   Result Value Ref Range    Hemoglobin A1C 5.6 4.0 - 5.6 %    Estimated Avg Glucose 114 68 - 131 mg/dL            ASSESSMENT AND PLAN:  1. Routine medical exam  Counseled on age appropriate medical preventative services including age  appropriate cancer screenings, age appropriate eye and dental exams, over all nutritional health, need for a consistent exercise regimen, and an over all push towards maintaining a vigorous and active lifestyle.  Counseled on age appropriate vaccines and discussed upcoming health care needs based on age/gender. Discussed good sleep hygiene and stress management.    2. Essential hypertension  Discussed sodium restriction, maintaining ideal body weight and regular exercise program as physiologic means to achieve blood pressure control. The patient will strive towards this.   The current medical regimen is effective;  continue present plan and medications. Recommended patient to check home readings to monitor and see me for followup as scheduled or sooner as needed.   Patient was educated that both decongestant and anti-inflammatory medication may raise blood pressure.  The patient is not active on the digital hypertension program.  - losartan (COZAAR) 100 MG tablet; Take 1 tablet (100 mg total) by mouth once daily.  Dispense: 90 tablet; Refill: 1  - amLODIPine (NORVASC) 5 MG tablet; TAKE 1 TABLET(5 MG) BY MOUTH EVERY DAY  Dispense: 90 tablet; Refill: 1    3. Asymptomatic PVD (peripheral vascular disease)/4. Aortic arch atherosclerosis  I evaluated and reviewed with the patient their cardiovascular risk:  The 10-year ASCVD risk score (Be[place] DC Jr., et al., 2013) is: 9.7%    Values used to calculate the score:      Age: 70 years      Sex: Male      Is Non- : No      Diabetic: No      Tobacco smoker: No      Systolic Blood Pressure: 90 mmHg      Is BP treated: Yes      HDL Cholesterol: 52 mg/dL      Total Cholesterol: 190 mg/dL  We discussed that there would not be a benefit for the patient to take a daily aspirin.  We discussed that there is an indication for the patient to be on statin therapy, if tolerated. I will start him on lipitor 10mg daily. He will let me know if he develops any side  effects.  - atorvastatin (LIPITOR) 10 MG tablet; Take 1 tablet (10 mg total) by mouth once daily.  Dispense: 90 tablet; Refill: 3    5. Elevated hemidiaphragm  Stable. Asymptomatic. Observe.    6. Prediabetes  Stable. We discussed low sugar/low carbohydrate diet and regular exercise to prevent progression. No need for prescription medication at this time.    7. Gastroesophageal reflux disease without esophagitis  Symptoms controlled: yes, but needs to take medication on a daily basis to control symptoms.   Reflux precautions discussed (eliminate tobacco if a smoker; minimize caffeine, chocolate and red/white peppermint intake; avoid heavy and spicy meals; don't lay down within 2-3 hours after eating; minimize the intake of NSAIDs). Medication as needed.   Patient asked to take medication breaks, if possible - discussed chronic use can limit calcium absorption (which can lead to osteopenia/osteoporosis), increases the risk for intestinal infections, and can cause kidney damage. There are also some newer studies that show possible increased risk of mortality.    8. Elevated LFTs  His numbers have improved over the last few years with discontinuation of alcohol use.  May have some mild fatty liver, but is overall doing well and will continue with low-fat diet and regular exercise.  He is followed by GI.    9. Osteopenia of neck of femur, unspecified laterality  We discussed adequate calcium and vitamin D supplementation. We discussed fall precautions. He is up to date on his BMD.  He has been on Fosamax once weekly.  I will change this to Reclast infusion to see if this will help with either his cough or reflux symptoms.    10. Mild anxiety  The current medical regimen is effective;  continue present plan and medications.   - citalopram (CELEXA) 10 MG tablet; Take 1 tablet (10 mg total) by mouth once daily.  Dispense: 90 tablet; Refill: 1    11. Overweight (BMI 25.0-29.9)  The patient is asked to continue to make an  attempt to improve diet and exercise patterns to aid in medical management of this problem.     12. Flu vaccine need    - Influenza (FLUAD) - Quadrivalent (Adjuvanted) *Preferred* (65+) (PF)    13. Need for shingles vaccine    - (In Office Administered) Zoster Recombinant Vaccine    14. Advanced directives, counseling/discussion  Advance Care Planning   I initiated the process and explained the importance of advance care planning today with the patient.  Advanced directives were discussed due to patient's age and/or chronic medical conditions. Prognosis based on current condition: good.   Paperwork was previously given to complete living will (at this point in time, the patient does have full decision-making capacity.  We discussed different extreme health states that he could experience, and reviewed what kind of medical care he would want in those situations) and medical POA (The patient received detailed information about the importance of designating a Health Care Power of . He was also instructed to communicate with this person about their wishes for future healthcare, should he become sick and lose decision-making capacity).   [place] was not discussed.   Approximately 3 minute(s) were spent on counseling/discussion regarding end of life decision making.           15. Cough  He has had a full GI workup.  He is daily PPI.  Will refer him to pulmonary for further evaluation.  - Ambulatory referral/consult to Pulmonology; Future    16. Allergic rhinitis, unspecified seasonality, unspecified trigger  The current medical regimen is effective;  continue present plan and medications.   - fluticasone propionate (FLONASE) 50 mcg/actuation nasal spray; 1 spray (50 mcg total) by Each Nostril route once daily.  Dispense: 48 g; Refill: 1           Follow up in 6 months (on 6/14/2021), or if symptoms worsen or fail to improve, for follow up chronic medical conditions.. or sooner as needed.

## 2021-01-08 ENCOUNTER — INFUSION (OUTPATIENT)
Dept: INFUSION THERAPY | Facility: HOSPITAL | Age: 71
End: 2021-01-08
Attending: INTERNAL MEDICINE
Payer: COMMERCIAL

## 2021-01-08 VITALS
TEMPERATURE: 98 F | RESPIRATION RATE: 17 BRPM | OXYGEN SATURATION: 95 % | SYSTOLIC BLOOD PRESSURE: 116 MMHG | HEART RATE: 84 BPM | DIASTOLIC BLOOD PRESSURE: 63 MMHG

## 2021-01-08 DIAGNOSIS — M85.859 OSTEOPENIA OF NECK OF FEMUR, UNSPECIFIED LATERALITY: Primary | ICD-10-CM

## 2021-01-08 PROCEDURE — 63600175 PHARM REV CODE 636 W HCPCS: Performed by: INTERNAL MEDICINE

## 2021-01-08 PROCEDURE — 96374 THER/PROPH/DIAG INJ IV PUSH: CPT

## 2021-01-08 RX ORDER — SODIUM CHLORIDE 0.9 % (FLUSH) 0.9 %
10 SYRINGE (ML) INJECTION
Status: CANCELLED | OUTPATIENT
Start: 2021-01-08

## 2021-01-08 RX ORDER — HEPARIN 100 UNIT/ML
500 SYRINGE INTRAVENOUS
Status: CANCELLED | OUTPATIENT
Start: 2021-01-08

## 2021-01-08 RX ORDER — ZOLEDRONIC ACID 5 MG/100ML
5 INJECTION, SOLUTION INTRAVENOUS
Status: COMPLETED | OUTPATIENT
Start: 2021-01-08 | End: 2021-01-08

## 2021-01-08 RX ORDER — ZOLEDRONIC ACID 5 MG/100ML
5 INJECTION, SOLUTION INTRAVENOUS
Status: CANCELLED | OUTPATIENT
Start: 2021-01-08

## 2021-01-08 RX ADMIN — ZOLEDRONIC ACID 5 MG: 5 SOLUTION INTRAVENOUS at 09:01

## 2021-02-04 ENCOUNTER — OFFICE VISIT (OUTPATIENT)
Dept: PULMONOLOGY | Facility: CLINIC | Age: 71
End: 2021-02-04
Payer: COMMERCIAL

## 2021-02-04 VITALS
DIASTOLIC BLOOD PRESSURE: 60 MMHG | HEIGHT: 72 IN | OXYGEN SATURATION: 93 % | HEART RATE: 94 BPM | BODY MASS INDEX: 28.86 KG/M2 | SYSTOLIC BLOOD PRESSURE: 90 MMHG | WEIGHT: 213.06 LBS | TEMPERATURE: 99 F

## 2021-02-04 DIAGNOSIS — R05.3 CHRONIC COUGH: Primary | ICD-10-CM

## 2021-02-04 DIAGNOSIS — R91.8 PULMONARY NODULES/LESIONS, MULTIPLE: ICD-10-CM

## 2021-02-04 PROCEDURE — 99999 PR PBB SHADOW E&M-EST. PATIENT-LVL IV: ICD-10-PCS | Mod: PBBFAC,,, | Performed by: NURSE PRACTITIONER

## 2021-02-04 PROCEDURE — 1126F AMNT PAIN NOTED NONE PRSNT: CPT | Mod: S$GLB,,, | Performed by: NURSE PRACTITIONER

## 2021-02-04 PROCEDURE — 99203 OFFICE O/P NEW LOW 30 MIN: CPT | Mod: S$GLB,,, | Performed by: NURSE PRACTITIONER

## 2021-02-04 PROCEDURE — 1101F PR PT FALLS ASSESS DOC 0-1 FALLS W/OUT INJ PAST YR: ICD-10-PCS | Mod: CPTII,S$GLB,, | Performed by: NURSE PRACTITIONER

## 2021-02-04 PROCEDURE — 3074F SYST BP LT 130 MM HG: CPT | Mod: CPTII,S$GLB,, | Performed by: NURSE PRACTITIONER

## 2021-02-04 PROCEDURE — 3288F FALL RISK ASSESSMENT DOCD: CPT | Mod: CPTII,S$GLB,, | Performed by: NURSE PRACTITIONER

## 2021-02-04 PROCEDURE — 3288F PR FALLS RISK ASSESSMENT DOCUMENTED: ICD-10-PCS | Mod: CPTII,S$GLB,, | Performed by: NURSE PRACTITIONER

## 2021-02-04 PROCEDURE — 1126F PR PAIN SEVERITY QUANTIFIED, NO PAIN PRESENT: ICD-10-PCS | Mod: S$GLB,,, | Performed by: NURSE PRACTITIONER

## 2021-02-04 PROCEDURE — 99999 PR PBB SHADOW E&M-EST. PATIENT-LVL IV: CPT | Mod: PBBFAC,,, | Performed by: NURSE PRACTITIONER

## 2021-02-04 PROCEDURE — 3008F PR BODY MASS INDEX (BMI) DOCUMENTED: ICD-10-PCS | Mod: CPTII,S$GLB,, | Performed by: NURSE PRACTITIONER

## 2021-02-04 PROCEDURE — 1101F PT FALLS ASSESS-DOCD LE1/YR: CPT | Mod: CPTII,S$GLB,, | Performed by: NURSE PRACTITIONER

## 2021-02-04 PROCEDURE — 3074F PR MOST RECENT SYSTOLIC BLOOD PRESSURE < 130 MM HG: ICD-10-PCS | Mod: CPTII,S$GLB,, | Performed by: NURSE PRACTITIONER

## 2021-02-04 PROCEDURE — 99203 PR OFFICE/OUTPT VISIT, NEW, LEVL III, 30-44 MIN: ICD-10-PCS | Mod: S$GLB,,, | Performed by: NURSE PRACTITIONER

## 2021-02-04 PROCEDURE — 1159F MED LIST DOCD IN RCRD: CPT | Mod: S$GLB,,, | Performed by: NURSE PRACTITIONER

## 2021-02-04 PROCEDURE — 3078F DIAST BP <80 MM HG: CPT | Mod: CPTII,S$GLB,, | Performed by: NURSE PRACTITIONER

## 2021-02-04 PROCEDURE — 3008F BODY MASS INDEX DOCD: CPT | Mod: CPTII,S$GLB,, | Performed by: NURSE PRACTITIONER

## 2021-02-04 PROCEDURE — 3078F PR MOST RECENT DIASTOLIC BLOOD PRESSURE < 80 MM HG: ICD-10-PCS | Mod: CPTII,S$GLB,, | Performed by: NURSE PRACTITIONER

## 2021-02-04 PROCEDURE — 1159F PR MEDICATION LIST DOCUMENTED IN MEDICAL RECORD: ICD-10-PCS | Mod: S$GLB,,, | Performed by: NURSE PRACTITIONER

## 2021-02-04 RX ORDER — ALBUTEROL SULFATE 90 UG/1
2 AEROSOL, METERED RESPIRATORY (INHALATION) EVERY 6 HOURS PRN
Qty: 18 G | Refills: 0 | Status: SHIPPED | OUTPATIENT
Start: 2021-02-04 | End: 2021-02-04 | Stop reason: SDUPTHER

## 2021-02-04 RX ORDER — ALBUTEROL SULFATE 90 UG/1
2 AEROSOL, METERED RESPIRATORY (INHALATION) EVERY 6 HOURS PRN
Qty: 18 G | Refills: 0 | Status: SHIPPED | OUTPATIENT
Start: 2021-02-04 | End: 2021-03-09 | Stop reason: SDUPTHER

## 2021-02-07 PROBLEM — R91.8 PULMONARY NODULES/LESIONS, MULTIPLE: Status: ACTIVE | Noted: 2021-02-07

## 2021-02-07 PROBLEM — R05.3 CHRONIC COUGH: Status: ACTIVE | Noted: 2021-02-07

## 2021-02-15 ENCOUNTER — LAB VISIT (OUTPATIENT)
Dept: FAMILY MEDICINE | Facility: CLINIC | Age: 71
End: 2021-02-15
Payer: COMMERCIAL

## 2021-02-15 ENCOUNTER — CLINICAL SUPPORT (OUTPATIENT)
Dept: FAMILY MEDICINE | Facility: CLINIC | Age: 71
End: 2021-02-15
Payer: COMMERCIAL

## 2021-02-15 DIAGNOSIS — Z23 NEED FOR ZOSTER VACCINE: Primary | ICD-10-CM

## 2021-02-15 DIAGNOSIS — R05.3 CHRONIC COUGH: ICD-10-CM

## 2021-02-15 PROCEDURE — 90471 IMMUNIZATION ADMIN: CPT | Mod: S$GLB,,, | Performed by: INTERNAL MEDICINE

## 2021-02-15 PROCEDURE — 90750 ZOSTER RECOMBINANT VACCINE: ICD-10-PCS | Mod: S$GLB,,, | Performed by: INTERNAL MEDICINE

## 2021-02-15 PROCEDURE — U0005 INFEC AGEN DETEC AMPLI PROBE: HCPCS

## 2021-02-15 PROCEDURE — 90750 HZV VACC RECOMBINANT IM: CPT | Mod: S$GLB,,, | Performed by: INTERNAL MEDICINE

## 2021-02-15 PROCEDURE — U0003 INFECTIOUS AGENT DETECTION BY NUCLEIC ACID (DNA OR RNA); SEVERE ACUTE RESPIRATORY SYNDROME CORONAVIRUS 2 (SARS-COV-2) (CORONAVIRUS DISEASE [COVID-19]), AMPLIFIED PROBE TECHNIQUE, MAKING USE OF HIGH THROUGHPUT TECHNOLOGIES AS DESCRIBED BY CMS-2020-01-R: HCPCS

## 2021-02-15 PROCEDURE — 99499 UNLISTED E&M SERVICE: CPT | Mod: S$GLB,,, | Performed by: INTERNAL MEDICINE

## 2021-02-15 PROCEDURE — 90471 ZOSTER RECOMBINANT VACCINE: ICD-10-PCS | Mod: S$GLB,,, | Performed by: INTERNAL MEDICINE

## 2021-02-15 PROCEDURE — 99499 NO LOS: ICD-10-PCS | Mod: S$GLB,,, | Performed by: INTERNAL MEDICINE

## 2021-02-16 LAB — SARS-COV-2 RNA RESP QL NAA+PROBE: NOT DETECTED

## 2021-02-18 ENCOUNTER — HOSPITAL ENCOUNTER (OUTPATIENT)
Dept: RESPIRATORY THERAPY | Facility: HOSPITAL | Age: 71
Discharge: HOME | End: 2021-02-18
Attending: INTERNAL MEDICINE
Payer: COMMERCIAL

## 2021-02-18 VITALS — OXYGEN SATURATION: 96 % | HEART RATE: 96 BPM | RESPIRATION RATE: 18 BRPM

## 2021-02-18 DIAGNOSIS — R05.3 CHRONIC COUGH: ICD-10-CM

## 2021-02-18 PROCEDURE — 94060 EVALUATION OF WHEEZING: CPT | Mod: 26,,, | Performed by: INTERNAL MEDICINE

## 2021-02-18 PROCEDURE — 94729 DIFFUSING CAPACITY: CPT

## 2021-02-18 PROCEDURE — 94727 GAS DIL/WSHOT DETER LNG VOL: CPT

## 2021-02-18 PROCEDURE — 94727 PR PULM FUNCTION TEST BY GAS: ICD-10-PCS | Mod: 26,,, | Performed by: INTERNAL MEDICINE

## 2021-02-18 PROCEDURE — 25000242 PHARM REV CODE 250 ALT 637 W/ HCPCS: Performed by: INTERNAL MEDICINE

## 2021-02-18 PROCEDURE — 94010 BREATHING CAPACITY TEST: CPT

## 2021-02-18 PROCEDURE — 94729 PR C02/MEMBANE DIFFUSE CAPACITY: ICD-10-PCS | Mod: 26,,, | Performed by: INTERNAL MEDICINE

## 2021-02-18 PROCEDURE — 94060 PR EVAL OF BRONCHOSPASM: ICD-10-PCS | Mod: 26,,, | Performed by: INTERNAL MEDICINE

## 2021-02-18 PROCEDURE — 94729 DIFFUSING CAPACITY: CPT | Mod: 26,,, | Performed by: INTERNAL MEDICINE

## 2021-02-18 PROCEDURE — 94727 GAS DIL/WSHOT DETER LNG VOL: CPT | Mod: 26,,, | Performed by: INTERNAL MEDICINE

## 2021-02-18 RX ORDER — ALBUTEROL SULFATE 2.5 MG/.5ML
2.5 SOLUTION RESPIRATORY (INHALATION) ONCE
Status: COMPLETED | OUTPATIENT
Start: 2021-02-18 | End: 2021-02-18

## 2021-02-18 RX ADMIN — ALBUTEROL SULFATE 2.5 MG: 2.5 SOLUTION RESPIRATORY (INHALATION) at 10:02

## 2021-02-21 LAB
BRPFT: NORMAL
DLCO ADJ PRE: 15.28 ML/(MIN*MMHG)
DLCO SINGLE BREATH LLN: 22.37
DLCO SINGLE BREATH PRE REF: 46.7 %
DLCO SINGLE BREATH REF: 29.3
DLCOC SBVA LLN: 2.79
DLCOC SBVA PRE REF: 66.6 %
DLCOC SBVA REF: 3.89
DLCOC SINGLE BREATH LLN: 22.37
DLCOC SINGLE BREATH PRE REF: 52.1 %
DLCOC SINGLE BREATH REF: 29.3
DLCOVA LLN: 2.79
DLCOVA PRE REF: 59.7 %
DLCOVA PRE: 2.32 ML/(MIN*MMHG*L)
DLCOVA REF: 3.89
DLVAADJ PRE: 2.59 ML/(MIN*MMHG*L)
ERVN2 LLN: -16448.86
ERVN2 PRE REF: 42.1 %
ERVN2 PRE: 0.48 L
ERVN2 REF: 1.14
FEF 25 75 CHG: 10 %
FEF 25 75 LLN: 1.16
FEF 25 75 POST REF: 103 %
FEF 25 75 PRE REF: 93.7 %
FEF 25 75 REF: 2.63
FET100 CHG: -3.5 %
FEV1 CHG: 5.4 %
FEV1 FVC CHG: 1 %
FEV1 FVC LLN: 63
FEV1 FVC POST REF: 102 %
FEV1 FVC PRE REF: 101 %
FEV1 FVC REF: 76
FEV1 LLN: 2.52
FEV1 POST REF: 88.6 %
FEV1 PRE REF: 84.1 %
FEV1 REF: 3.48
FRCN2 LLN: 2.82
FRCN2 PRE REF: 58.6 %
FRCN2 REF: 3.8
FVC CHG: 4.3 %
FVC LLN: 3.43
FVC POST REF: 86.5 %
FVC PRE REF: 82.9 %
FVC REF: 4.61
IVC PRE: 3.81 L
IVC SINGLE BREATH LLN: 3.43
IVC SINGLE BREATH PRE REF: 82.7 %
IVC SINGLE BREATH REF: 4.61
PEF CHG: -7.4 %
PEF LLN: 6.57
PEF POST REF: 98.7 %
PEF PRE REF: 106.5 %
PEF REF: 9.02
POST FEF 25 75: 2.71 L/S
POST FET 100: 7.93 SEC
POST FEV1 FVC: 77.31 %
POST FEV1: 3.08 L
POST FVC: 3.98 L
POST PEF: 8.9 L/S
PRE DLCO: 13.7 ML/(MIN*MMHG)
PRE FEF 25 75: 2.46 L/S
PRE FET 100: 8.22 SEC
PRE FEV1 FVC: 76.53 %
PRE FEV1: 2.92 L
PRE FRC N2: 2.23 L
PRE FVC: 3.82 L
PRE PEF: 9.61 L/S
RVN2 LLN: 1.99
RVN2 PRE REF: 65.7 %
RVN2 PRE: 1.75 L
RVN2 REF: 2.66
RVN2TLCN2 LLN: 31.5
RVN2TLCN2 PRE REF: 79.2 %
RVN2TLCN2 PRE: 32.05 %
RVN2TLCN2 REF: 40.48
TLCN2 LLN: 6.39
TLCN2 PRE REF: 72.4 %
TLCN2 PRE: 5.46 L
TLCN2 REF: 7.54
VA PRE: 5.9 L
VA SINGLE BREATH LLN: 7.39
VA SINGLE BREATH PRE REF: 79.8 %
VA SINGLE BREATH REF: 7.39
VCMAXN2 LLN: 3.43
VCMAXN2 PRE REF: 80.5 %
VCMAXN2 PRE: 3.71 L
VCMAXN2 REF: 4.61

## 2021-02-24 ENCOUNTER — TELEPHONE (OUTPATIENT)
Dept: PULMONOLOGY | Facility: CLINIC | Age: 71
End: 2021-02-24

## 2021-03-09 DIAGNOSIS — R05.3 CHRONIC COUGH: ICD-10-CM

## 2021-03-09 DIAGNOSIS — F41.9 MILD ANXIETY: ICD-10-CM

## 2021-03-09 DIAGNOSIS — I70.0 AORTIC ARCH ATHEROSCLEROSIS: ICD-10-CM

## 2021-03-09 DIAGNOSIS — J30.9 ALLERGIC RHINITIS, UNSPECIFIED SEASONALITY, UNSPECIFIED TRIGGER: ICD-10-CM

## 2021-03-09 DIAGNOSIS — I10 ESSENTIAL HYPERTENSION: ICD-10-CM

## 2021-03-09 RX ORDER — ATORVASTATIN CALCIUM 10 MG/1
10 TABLET, FILM COATED ORAL DAILY
Qty: 90 TABLET | Refills: 0 | Status: SHIPPED | OUTPATIENT
Start: 2021-03-09 | End: 2021-06-17 | Stop reason: SDUPTHER

## 2021-03-09 RX ORDER — LOSARTAN POTASSIUM 100 MG/1
100 TABLET ORAL DAILY
Qty: 90 TABLET | Refills: 0 | Status: SHIPPED | OUTPATIENT
Start: 2021-03-09 | End: 2021-06-17 | Stop reason: SDUPTHER

## 2021-03-09 RX ORDER — FLUTICASONE PROPIONATE 50 MCG
1 SPRAY, SUSPENSION (ML) NASAL DAILY
Qty: 48 G | Refills: 0 | Status: SHIPPED | OUTPATIENT
Start: 2021-03-09 | End: 2021-06-17 | Stop reason: SDUPTHER

## 2021-03-09 RX ORDER — CITALOPRAM 10 MG/1
10 TABLET ORAL DAILY
Qty: 90 TABLET | Refills: 0 | Status: SHIPPED | OUTPATIENT
Start: 2021-03-09 | End: 2021-06-17 | Stop reason: SDUPTHER

## 2021-03-09 RX ORDER — ALBUTEROL SULFATE 90 UG/1
2 AEROSOL, METERED RESPIRATORY (INHALATION) EVERY 6 HOURS PRN
Qty: 18 G | Refills: 0 | Status: SHIPPED | OUTPATIENT
Start: 2021-03-09 | End: 2021-06-17 | Stop reason: SDUPTHER

## 2021-03-09 RX ORDER — AMLODIPINE BESYLATE 5 MG/1
TABLET ORAL
Qty: 90 TABLET | Refills: 0 | Status: SHIPPED | OUTPATIENT
Start: 2021-03-09 | End: 2021-06-17 | Stop reason: SDUPTHER

## 2021-03-29 ENCOUNTER — IMMUNIZATION (OUTPATIENT)
Dept: PRIMARY CARE CLINIC | Facility: CLINIC | Age: 71
End: 2021-03-29
Payer: COMMERCIAL

## 2021-03-29 DIAGNOSIS — Z23 NEED FOR VACCINATION: Primary | ICD-10-CM

## 2021-03-29 PROCEDURE — 91301 PR SARS-COV-2 COVID-19 VACCINE, NO PRSV, 100MCG/0.5ML, IM: ICD-10-PCS | Mod: S$GLB,,, | Performed by: INTERNAL MEDICINE

## 2021-03-29 PROCEDURE — 0011A PR IMMUNIZ ADMIN, SARS-COV-2 COVID-19 VACC, 100MCG/0.5ML, 1ST DOSE: CPT | Mod: CV19,S$GLB,, | Performed by: INTERNAL MEDICINE

## 2021-03-29 PROCEDURE — 0011A PR IMMUNIZ ADMIN, SARS-COV-2 COVID-19 VACC, 100MCG/0.5ML, 1ST DOSE: ICD-10-PCS | Mod: CV19,S$GLB,, | Performed by: INTERNAL MEDICINE

## 2021-03-29 PROCEDURE — 91301 PR SARS-COV-2 COVID-19 VACCINE, NO PRSV, 100MCG/0.5ML, IM: CPT | Mod: S$GLB,,, | Performed by: INTERNAL MEDICINE

## 2021-03-29 RX ADMIN — Medication 0.5 ML: at 02:03

## 2021-04-28 ENCOUNTER — IMMUNIZATION (OUTPATIENT)
Dept: PRIMARY CARE CLINIC | Facility: CLINIC | Age: 71
End: 2021-04-28
Payer: COMMERCIAL

## 2021-04-28 DIAGNOSIS — Z23 NEED FOR VACCINATION: Primary | ICD-10-CM

## 2021-04-28 PROCEDURE — 91301 PR SARS-COV-2 COVID-19 VACCINE, NO PRSV, 100MCG/0.5ML, IM: ICD-10-PCS | Mod: S$GLB,,, | Performed by: INTERNAL MEDICINE

## 2021-04-28 PROCEDURE — 91301 PR SARS-COV-2 COVID-19 VACCINE, NO PRSV, 100MCG/0.5ML, IM: CPT | Mod: S$GLB,,, | Performed by: INTERNAL MEDICINE

## 2021-04-28 PROCEDURE — 0012A PR IMMUNIZ ADMIN, SARS-COV-2 COVID-19 VACC, 100MCG/0.5ML, 2ND DOSE: ICD-10-PCS | Mod: CV19,S$GLB,, | Performed by: INTERNAL MEDICINE

## 2021-04-28 PROCEDURE — 0012A PR IMMUNIZ ADMIN, SARS-COV-2 COVID-19 VACC, 100MCG/0.5ML, 2ND DOSE: CPT | Mod: CV19,S$GLB,, | Performed by: INTERNAL MEDICINE

## 2021-04-28 RX ADMIN — Medication 0.5 ML: at 03:04

## 2021-06-17 DIAGNOSIS — F41.9 MILD ANXIETY: ICD-10-CM

## 2021-06-17 DIAGNOSIS — J30.9 ALLERGIC RHINITIS, UNSPECIFIED SEASONALITY, UNSPECIFIED TRIGGER: ICD-10-CM

## 2021-06-17 DIAGNOSIS — I10 ESSENTIAL HYPERTENSION: ICD-10-CM

## 2021-06-17 DIAGNOSIS — R05.3 CHRONIC COUGH: ICD-10-CM

## 2021-06-17 DIAGNOSIS — I70.0 AORTIC ARCH ATHEROSCLEROSIS: ICD-10-CM

## 2021-06-17 RX ORDER — CITALOPRAM 10 MG/1
10 TABLET ORAL DAILY
Qty: 90 TABLET | Refills: 0 | Status: SHIPPED | OUTPATIENT
Start: 2021-06-17 | End: 2021-09-17 | Stop reason: SDUPTHER

## 2021-06-17 RX ORDER — ALBUTEROL SULFATE 90 UG/1
2 AEROSOL, METERED RESPIRATORY (INHALATION) EVERY 6 HOURS PRN
Qty: 18 G | Refills: 0 | Status: SHIPPED | OUTPATIENT
Start: 2021-06-17 | End: 2021-09-17 | Stop reason: SDUPTHER

## 2021-06-17 RX ORDER — FLUTICASONE PROPIONATE 50 MCG
1 SPRAY, SUSPENSION (ML) NASAL DAILY
Qty: 48 G | Refills: 0 | Status: SHIPPED | OUTPATIENT
Start: 2021-06-17 | End: 2021-09-17 | Stop reason: SDUPTHER

## 2021-06-17 RX ORDER — AMLODIPINE BESYLATE 5 MG/1
TABLET ORAL
Qty: 90 TABLET | Refills: 0 | Status: SHIPPED | OUTPATIENT
Start: 2021-06-17 | End: 2021-09-17 | Stop reason: SDUPTHER

## 2021-06-17 RX ORDER — ATORVASTATIN CALCIUM 10 MG/1
10 TABLET, FILM COATED ORAL DAILY
Qty: 90 TABLET | Refills: 0 | Status: SHIPPED | OUTPATIENT
Start: 2021-06-17 | End: 2021-09-17 | Stop reason: SDUPTHER

## 2021-06-17 RX ORDER — LOSARTAN POTASSIUM 100 MG/1
100 TABLET ORAL DAILY
Qty: 90 TABLET | Refills: 0 | Status: SHIPPED | OUTPATIENT
Start: 2021-06-17 | End: 2021-09-17 | Stop reason: SDUPTHER

## 2021-09-16 ENCOUNTER — OFFICE VISIT (OUTPATIENT)
Dept: UROLOGY | Facility: CLINIC | Age: 71
End: 2021-09-16
Payer: COMMERCIAL

## 2021-09-16 ENCOUNTER — HOSPITAL ENCOUNTER (OUTPATIENT)
Dept: RADIOLOGY | Facility: HOSPITAL | Age: 71
Discharge: HOME | End: 2021-09-16
Attending: UROLOGY
Payer: COMMERCIAL

## 2021-09-16 VITALS — WEIGHT: 211.63 LBS | BODY MASS INDEX: 28.05 KG/M2 | HEIGHT: 73 IN

## 2021-09-16 DIAGNOSIS — N48.1 BALANITIS: ICD-10-CM

## 2021-09-16 DIAGNOSIS — N39.0 URINARY TRACT INFECTION WITHOUT HEMATURIA, SITE UNSPECIFIED: ICD-10-CM

## 2021-09-16 DIAGNOSIS — N39.0 URINARY TRACT INFECTION WITHOUT HEMATURIA, SITE UNSPECIFIED: Primary | ICD-10-CM

## 2021-09-16 PROCEDURE — 3008F PR BODY MASS INDEX (BMI) DOCUMENTED: ICD-10-PCS | Mod: CPTII,S$GLB,, | Performed by: UROLOGY

## 2021-09-16 PROCEDURE — 87086 URINE CULTURE/COLONY COUNT: CPT | Performed by: UROLOGY

## 2021-09-16 PROCEDURE — 99999 PR PBB SHADOW E&M-EST. PATIENT-LVL III: CPT | Mod: PBBFAC,,, | Performed by: UROLOGY

## 2021-09-16 PROCEDURE — 76770 US RETROPERITONEAL COMPLETE: ICD-10-PCS | Mod: 26,,, | Performed by: RADIOLOGY

## 2021-09-16 PROCEDURE — 4010F PR ACE/ARB THEARPY RXD/TAKEN: ICD-10-PCS | Mod: CPTII,S$GLB,, | Performed by: UROLOGY

## 2021-09-16 PROCEDURE — 99204 PR OFFICE/OUTPT VISIT, NEW, LEVL IV, 45-59 MIN: ICD-10-PCS | Mod: 25,S$GLB,, | Performed by: UROLOGY

## 2021-09-16 PROCEDURE — 3288F FALL RISK ASSESSMENT DOCD: CPT | Mod: CPTII,S$GLB,, | Performed by: UROLOGY

## 2021-09-16 PROCEDURE — 99204 OFFICE O/P NEW MOD 45 MIN: CPT | Mod: 25,S$GLB,, | Performed by: UROLOGY

## 2021-09-16 PROCEDURE — 4010F ACE/ARB THERAPY RXD/TAKEN: CPT | Mod: CPTII,S$GLB,, | Performed by: UROLOGY

## 2021-09-16 PROCEDURE — 87186 SC STD MICRODIL/AGAR DIL: CPT | Performed by: UROLOGY

## 2021-09-16 PROCEDURE — 1160F PR REVIEW ALL MEDS BY PRESCRIBER/CLIN PHARMACIST DOCUMENTED: ICD-10-PCS | Mod: CPTII,S$GLB,, | Performed by: UROLOGY

## 2021-09-16 PROCEDURE — 87088 URINE BACTERIA CULTURE: CPT | Performed by: UROLOGY

## 2021-09-16 PROCEDURE — 1159F MED LIST DOCD IN RCRD: CPT | Mod: CPTII,S$GLB,, | Performed by: UROLOGY

## 2021-09-16 PROCEDURE — 1101F PT FALLS ASSESS-DOCD LE1/YR: CPT | Mod: CPTII,S$GLB,, | Performed by: UROLOGY

## 2021-09-16 PROCEDURE — 76770 US EXAM ABDO BACK WALL COMP: CPT | Mod: 26,,, | Performed by: RADIOLOGY

## 2021-09-16 PROCEDURE — 87077 CULTURE AEROBIC IDENTIFY: CPT | Performed by: UROLOGY

## 2021-09-16 PROCEDURE — 3008F BODY MASS INDEX DOCD: CPT | Mod: CPTII,S$GLB,, | Performed by: UROLOGY

## 2021-09-16 PROCEDURE — 1126F PR PAIN SEVERITY QUANTIFIED, NO PAIN PRESENT: ICD-10-PCS | Mod: CPTII,S$GLB,, | Performed by: UROLOGY

## 2021-09-16 PROCEDURE — 96372 THER/PROPH/DIAG INJ SC/IM: CPT | Mod: S$GLB,,, | Performed by: UROLOGY

## 2021-09-16 PROCEDURE — 3288F PR FALLS RISK ASSESSMENT DOCUMENTED: ICD-10-PCS | Mod: CPTII,S$GLB,, | Performed by: UROLOGY

## 2021-09-16 PROCEDURE — 96372 PR INJECTION,THERAP/PROPH/DIAG2ST, IM OR SUBCUT: ICD-10-PCS | Mod: S$GLB,,, | Performed by: UROLOGY

## 2021-09-16 PROCEDURE — 76770 US EXAM ABDO BACK WALL COMP: CPT | Mod: TC

## 2021-09-16 PROCEDURE — 1101F PR PT FALLS ASSESS DOC 0-1 FALLS W/OUT INJ PAST YR: ICD-10-PCS | Mod: CPTII,S$GLB,, | Performed by: UROLOGY

## 2021-09-16 PROCEDURE — 1160F RVW MEDS BY RX/DR IN RCRD: CPT | Mod: CPTII,S$GLB,, | Performed by: UROLOGY

## 2021-09-16 PROCEDURE — 99999 PR PBB SHADOW E&M-EST. PATIENT-LVL III: ICD-10-PCS | Mod: PBBFAC,,, | Performed by: UROLOGY

## 2021-09-16 PROCEDURE — 1126F AMNT PAIN NOTED NONE PRSNT: CPT | Mod: CPTII,S$GLB,, | Performed by: UROLOGY

## 2021-09-16 PROCEDURE — 1159F PR MEDICATION LIST DOCUMENTED IN MEDICAL RECORD: ICD-10-PCS | Mod: CPTII,S$GLB,, | Performed by: UROLOGY

## 2021-09-16 RX ORDER — AMOXICILLIN AND CLAVULANATE POTASSIUM 875; 125 MG/1; MG/1
1 TABLET, FILM COATED ORAL 2 TIMES DAILY
Qty: 28 TABLET | Refills: 1 | Status: ON HOLD | OUTPATIENT
Start: 2021-09-16 | End: 2021-10-15

## 2021-09-16 RX ORDER — NYSTATIN AND TRIAMCINOLONE ACETONIDE 100000; 1 [USP'U]/G; MG/G
CREAM TOPICAL 4 TIMES DAILY
Qty: 30 G | Refills: 4 | Status: SHIPPED | OUTPATIENT
Start: 2021-09-16 | End: 2023-11-02 | Stop reason: ALTCHOICE

## 2021-09-16 RX ORDER — GENTAMICIN SULFATE 40 MG/ML
160 INJECTION, SOLUTION INTRAMUSCULAR; INTRAVENOUS
Status: COMPLETED | OUTPATIENT
Start: 2021-09-16 | End: 2021-09-16

## 2021-09-16 RX ADMIN — GENTAMICIN SULFATE 160 MG: 40 INJECTION, SOLUTION INTRAMUSCULAR; INTRAVENOUS at 10:09

## 2021-09-17 ENCOUNTER — PATIENT MESSAGE (OUTPATIENT)
Dept: UROLOGY | Facility: CLINIC | Age: 71
End: 2021-09-17

## 2021-09-17 DIAGNOSIS — I10 ESSENTIAL HYPERTENSION: ICD-10-CM

## 2021-09-17 DIAGNOSIS — R05.3 CHRONIC COUGH: ICD-10-CM

## 2021-09-17 DIAGNOSIS — J30.9 ALLERGIC RHINITIS, UNSPECIFIED SEASONALITY, UNSPECIFIED TRIGGER: ICD-10-CM

## 2021-09-17 DIAGNOSIS — F41.9 MILD ANXIETY: ICD-10-CM

## 2021-09-17 DIAGNOSIS — I70.0 AORTIC ARCH ATHEROSCLEROSIS: ICD-10-CM

## 2021-09-17 RX ORDER — ATORVASTATIN CALCIUM 10 MG/1
10 TABLET, FILM COATED ORAL DAILY
Qty: 90 TABLET | Refills: 0 | Status: SHIPPED | OUTPATIENT
Start: 2021-09-17 | End: 2021-12-08 | Stop reason: SDUPTHER

## 2021-09-17 RX ORDER — LOSARTAN POTASSIUM 100 MG/1
100 TABLET ORAL DAILY
Qty: 90 TABLET | Refills: 0 | Status: SHIPPED | OUTPATIENT
Start: 2021-09-17 | End: 2021-12-08 | Stop reason: SDUPTHER

## 2021-09-17 RX ORDER — AMLODIPINE BESYLATE 5 MG/1
TABLET ORAL
Qty: 90 TABLET | Refills: 0 | Status: SHIPPED | OUTPATIENT
Start: 2021-09-17 | End: 2021-12-08 | Stop reason: SDUPTHER

## 2021-09-17 RX ORDER — FLUTICASONE PROPIONATE 50 MCG
1 SPRAY, SUSPENSION (ML) NASAL DAILY
Qty: 48 G | Refills: 0 | Status: SHIPPED | OUTPATIENT
Start: 2021-09-17 | End: 2021-12-08 | Stop reason: SDUPTHER

## 2021-09-17 RX ORDER — ALBUTEROL SULFATE 90 UG/1
2 AEROSOL, METERED RESPIRATORY (INHALATION) EVERY 6 HOURS PRN
Qty: 18 G | Refills: 0 | Status: SHIPPED | OUTPATIENT
Start: 2021-09-17

## 2021-09-17 RX ORDER — CITALOPRAM 10 MG/1
10 TABLET ORAL DAILY
Qty: 90 TABLET | Refills: 0 | Status: SHIPPED | OUTPATIENT
Start: 2021-09-17 | End: 2021-12-08 | Stop reason: SDUPTHER

## 2021-09-18 LAB — BACTERIA UR CULT: ABNORMAL

## 2021-09-30 ENCOUNTER — OFFICE VISIT (OUTPATIENT)
Dept: UROLOGY | Facility: CLINIC | Age: 71
End: 2021-09-30
Payer: COMMERCIAL

## 2021-09-30 ENCOUNTER — TELEPHONE (OUTPATIENT)
Dept: UROLOGY | Facility: CLINIC | Age: 71
End: 2021-09-30

## 2021-09-30 VITALS
SYSTOLIC BLOOD PRESSURE: 115 MMHG | HEIGHT: 73 IN | WEIGHT: 218.69 LBS | BODY MASS INDEX: 28.98 KG/M2 | DIASTOLIC BLOOD PRESSURE: 73 MMHG | HEART RATE: 96 BPM

## 2021-09-30 DIAGNOSIS — N39.0 URINARY TRACT INFECTION WITHOUT HEMATURIA, SITE UNSPECIFIED: ICD-10-CM

## 2021-09-30 DIAGNOSIS — N35.911 STRICTURE OF URETHRAL MEATUS IN MALE, UNSPECIFIED STRICTURE TYPE: Primary | ICD-10-CM

## 2021-09-30 PROCEDURE — 3078F PR MOST RECENT DIASTOLIC BLOOD PRESSURE < 80 MM HG: ICD-10-PCS | Mod: CPTII,S$GLB,, | Performed by: UROLOGY

## 2021-09-30 PROCEDURE — 1160F PR REVIEW ALL MEDS BY PRESCRIBER/CLIN PHARMACIST DOCUMENTED: ICD-10-PCS | Mod: CPTII,S$GLB,, | Performed by: UROLOGY

## 2021-09-30 PROCEDURE — 3288F PR FALLS RISK ASSESSMENT DOCUMENTED: ICD-10-PCS | Mod: CPTII,S$GLB,, | Performed by: UROLOGY

## 2021-09-30 PROCEDURE — 3288F FALL RISK ASSESSMENT DOCD: CPT | Mod: CPTII,S$GLB,, | Performed by: UROLOGY

## 2021-09-30 PROCEDURE — 3074F SYST BP LT 130 MM HG: CPT | Mod: CPTII,S$GLB,, | Performed by: UROLOGY

## 2021-09-30 PROCEDURE — 1126F PR PAIN SEVERITY QUANTIFIED, NO PAIN PRESENT: ICD-10-PCS | Mod: CPTII,S$GLB,, | Performed by: UROLOGY

## 2021-09-30 PROCEDURE — 1101F PT FALLS ASSESS-DOCD LE1/YR: CPT | Mod: CPTII,S$GLB,, | Performed by: UROLOGY

## 2021-09-30 PROCEDURE — 99214 OFFICE O/P EST MOD 30 MIN: CPT | Mod: S$GLB,,, | Performed by: UROLOGY

## 2021-09-30 PROCEDURE — 1101F PR PT FALLS ASSESS DOC 0-1 FALLS W/OUT INJ PAST YR: ICD-10-PCS | Mod: CPTII,S$GLB,, | Performed by: UROLOGY

## 2021-09-30 PROCEDURE — 1126F AMNT PAIN NOTED NONE PRSNT: CPT | Mod: CPTII,S$GLB,, | Performed by: UROLOGY

## 2021-09-30 PROCEDURE — 99999 PR PBB SHADOW E&M-EST. PATIENT-LVL III: CPT | Mod: PBBFAC,,, | Performed by: UROLOGY

## 2021-09-30 PROCEDURE — 3008F BODY MASS INDEX DOCD: CPT | Mod: CPTII,S$GLB,, | Performed by: UROLOGY

## 2021-09-30 PROCEDURE — 1160F RVW MEDS BY RX/DR IN RCRD: CPT | Mod: CPTII,S$GLB,, | Performed by: UROLOGY

## 2021-09-30 PROCEDURE — 4010F PR ACE/ARB THEARPY RXD/TAKEN: ICD-10-PCS | Mod: CPTII,S$GLB,, | Performed by: UROLOGY

## 2021-09-30 PROCEDURE — 3078F DIAST BP <80 MM HG: CPT | Mod: CPTII,S$GLB,, | Performed by: UROLOGY

## 2021-09-30 PROCEDURE — 99999 PR PBB SHADOW E&M-EST. PATIENT-LVL III: ICD-10-PCS | Mod: PBBFAC,,, | Performed by: UROLOGY

## 2021-09-30 PROCEDURE — 4010F ACE/ARB THERAPY RXD/TAKEN: CPT | Mod: CPTII,S$GLB,, | Performed by: UROLOGY

## 2021-09-30 PROCEDURE — 1159F MED LIST DOCD IN RCRD: CPT | Mod: CPTII,S$GLB,, | Performed by: UROLOGY

## 2021-09-30 PROCEDURE — 99214 PR OFFICE/OUTPT VISIT, EST, LEVL IV, 30-39 MIN: ICD-10-PCS | Mod: S$GLB,,, | Performed by: UROLOGY

## 2021-09-30 PROCEDURE — 3074F PR MOST RECENT SYSTOLIC BLOOD PRESSURE < 130 MM HG: ICD-10-PCS | Mod: CPTII,S$GLB,, | Performed by: UROLOGY

## 2021-09-30 PROCEDURE — 1159F PR MEDICATION LIST DOCUMENTED IN MEDICAL RECORD: ICD-10-PCS | Mod: CPTII,S$GLB,, | Performed by: UROLOGY

## 2021-09-30 PROCEDURE — 3008F PR BODY MASS INDEX (BMI) DOCUMENTED: ICD-10-PCS | Mod: CPTII,S$GLB,, | Performed by: UROLOGY

## 2021-09-30 RX ORDER — AMOXICILLIN AND CLAVULANATE POTASSIUM 875; 125 MG/1; MG/1
1 TABLET, FILM COATED ORAL 2 TIMES DAILY
Qty: 60 TABLET | Refills: 1 | Status: SHIPPED | OUTPATIENT
Start: 2021-09-30 | End: 2021-12-15 | Stop reason: ALTCHOICE

## 2021-10-04 ENCOUNTER — LAB VISIT (OUTPATIENT)
Dept: LAB | Facility: HOSPITAL | Age: 71
End: 2021-10-04
Attending: UROLOGY
Payer: COMMERCIAL

## 2021-10-04 DIAGNOSIS — N35.911 STRICTURE OF URETHRAL MEATUS IN MALE, UNSPECIFIED STRICTURE TYPE: ICD-10-CM

## 2021-10-04 PROCEDURE — 87077 CULTURE AEROBIC IDENTIFY: CPT | Performed by: UROLOGY

## 2021-10-04 PROCEDURE — 87088 URINE BACTERIA CULTURE: CPT | Performed by: UROLOGY

## 2021-10-04 PROCEDURE — 87186 SC STD MICRODIL/AGAR DIL: CPT | Performed by: UROLOGY

## 2021-10-04 PROCEDURE — 87086 URINE CULTURE/COLONY COUNT: CPT | Performed by: UROLOGY

## 2021-10-06 LAB — BACTERIA UR CULT: ABNORMAL

## 2021-10-11 ENCOUNTER — CLINICAL SUPPORT (OUTPATIENT)
Dept: UROLOGY | Facility: CLINIC | Age: 71
End: 2021-10-11
Payer: COMMERCIAL

## 2021-10-11 VITALS
HEART RATE: 78 BPM | BODY MASS INDEX: 28.98 KG/M2 | DIASTOLIC BLOOD PRESSURE: 83 MMHG | SYSTOLIC BLOOD PRESSURE: 131 MMHG | HEIGHT: 73 IN | WEIGHT: 218.69 LBS

## 2021-10-11 PROCEDURE — 99999 PR PBB SHADOW E&M-EST. PATIENT-LVL III: ICD-10-PCS | Mod: PBBFAC,,,

## 2021-10-11 PROCEDURE — 99999 PR PBB SHADOW E&M-EST. PATIENT-LVL III: CPT | Mod: PBBFAC,,,

## 2021-10-12 ENCOUNTER — HOSPITAL ENCOUNTER (OUTPATIENT)
Dept: PREADMISSION TESTING | Facility: HOSPITAL | Age: 71
Discharge: HOME | End: 2021-10-12
Attending: UROLOGY
Payer: COMMERCIAL

## 2021-10-12 ENCOUNTER — CLINICAL SUPPORT (OUTPATIENT)
Dept: UROLOGY | Facility: CLINIC | Age: 71
End: 2021-10-12
Payer: COMMERCIAL

## 2021-10-12 DIAGNOSIS — N35.911 STRICTURE OF URETHRAL MEATUS IN MALE, UNSPECIFIED STRICTURE TYPE: ICD-10-CM

## 2021-10-12 LAB — SARS-COV-2 RDRP RESP QL NAA+PROBE: NEGATIVE

## 2021-10-12 PROCEDURE — U0002 COVID-19 LAB TEST NON-CDC: HCPCS | Performed by: UROLOGY

## 2021-10-12 PROCEDURE — 99999 PR PBB SHADOW E&M-EST. PATIENT-LVL I: CPT | Mod: PBBFAC,,,

## 2021-10-12 PROCEDURE — 99999 PR PBB SHADOW E&M-EST. PATIENT-LVL I: ICD-10-PCS | Mod: PBBFAC,,,

## 2021-10-13 ENCOUNTER — CLINICAL SUPPORT (OUTPATIENT)
Dept: UROLOGY | Facility: CLINIC | Age: 71
End: 2021-10-13
Payer: COMMERCIAL

## 2021-10-13 ENCOUNTER — TELEPHONE (OUTPATIENT)
Dept: UROLOGY | Facility: CLINIC | Age: 71
End: 2021-10-13

## 2021-10-13 VITALS
HEART RATE: 103 BPM | HEIGHT: 73 IN | WEIGHT: 218.69 LBS | DIASTOLIC BLOOD PRESSURE: 81 MMHG | SYSTOLIC BLOOD PRESSURE: 129 MMHG | BODY MASS INDEX: 28.98 KG/M2

## 2021-10-13 DIAGNOSIS — N39.0 URINARY TRACT INFECTION WITHOUT HEMATURIA, SITE UNSPECIFIED: Primary | ICD-10-CM

## 2021-10-13 DIAGNOSIS — N39.0 URINARY TRACT INFECTION WITHOUT HEMATURIA, SITE UNSPECIFIED: ICD-10-CM

## 2021-10-13 PROCEDURE — 87086 URINE CULTURE/COLONY COUNT: CPT | Performed by: UROLOGY

## 2021-10-13 PROCEDURE — 87077 CULTURE AEROBIC IDENTIFY: CPT | Performed by: UROLOGY

## 2021-10-13 PROCEDURE — 99999 PR PBB SHADOW E&M-EST. PATIENT-LVL III: CPT | Mod: PBBFAC,,,

## 2021-10-13 PROCEDURE — 87088 URINE BACTERIA CULTURE: CPT | Performed by: UROLOGY

## 2021-10-13 PROCEDURE — 87186 SC STD MICRODIL/AGAR DIL: CPT | Performed by: UROLOGY

## 2021-10-13 PROCEDURE — 99999 PR PBB SHADOW E&M-EST. PATIENT-LVL III: ICD-10-PCS | Mod: PBBFAC,,,

## 2021-10-14 ENCOUNTER — TELEPHONE (OUTPATIENT)
Dept: UROLOGY | Facility: CLINIC | Age: 71
End: 2021-10-14

## 2021-10-15 ENCOUNTER — HOSPITAL ENCOUNTER (OUTPATIENT)
Facility: HOSPITAL | Age: 71
Discharge: HOME | End: 2021-10-15
Attending: UROLOGY | Admitting: UROLOGY
Payer: COMMERCIAL

## 2021-10-15 ENCOUNTER — ANESTHESIA EVENT (OUTPATIENT)
Dept: SURGERY | Facility: HOSPITAL | Age: 71
End: 2021-10-15
Payer: COMMERCIAL

## 2021-10-15 ENCOUNTER — ANESTHESIA (OUTPATIENT)
Dept: SURGERY | Facility: HOSPITAL | Age: 71
End: 2021-10-15
Payer: COMMERCIAL

## 2021-10-15 VITALS
TEMPERATURE: 98 F | RESPIRATION RATE: 18 BRPM | WEIGHT: 210 LBS | DIASTOLIC BLOOD PRESSURE: 60 MMHG | SYSTOLIC BLOOD PRESSURE: 148 MMHG | OXYGEN SATURATION: 96 % | HEART RATE: 75 BPM | BODY MASS INDEX: 27.71 KG/M2

## 2021-10-15 DIAGNOSIS — N35.919 URETHRAL MEATAL STENOSIS: ICD-10-CM

## 2021-10-15 LAB — BACTERIA UR CULT: ABNORMAL

## 2021-10-15 PROCEDURE — 71000015 HC POSTOP RECOV 1ST HR: Performed by: UROLOGY

## 2021-10-15 PROCEDURE — C1769 GUIDE WIRE: HCPCS | Performed by: UROLOGY

## 2021-10-15 PROCEDURE — 63600175 PHARM REV CODE 636 W HCPCS: Performed by: NURSE ANESTHETIST, CERTIFIED REGISTERED

## 2021-10-15 PROCEDURE — 63600175 PHARM REV CODE 636 W HCPCS: Performed by: STUDENT IN AN ORGANIZED HEALTH CARE EDUCATION/TRAINING PROGRAM

## 2021-10-15 PROCEDURE — 52281 CYSTOSCOPY AND TREATMENT: CPT | Mod: ,,, | Performed by: UROLOGY

## 2021-10-15 PROCEDURE — 25000003 PHARM REV CODE 250: Performed by: NURSE ANESTHETIST, CERTIFIED REGISTERED

## 2021-10-15 PROCEDURE — 37000008 HC ANESTHESIA 1ST 15 MINUTES: Performed by: UROLOGY

## 2021-10-15 PROCEDURE — D9220A PRA ANESTHESIA: ICD-10-PCS | Mod: ANES,,, | Performed by: STUDENT IN AN ORGANIZED HEALTH CARE EDUCATION/TRAINING PROGRAM

## 2021-10-15 PROCEDURE — 52281 PR CYSTOSCOPY,DIL URETHRAL STRICTURE: ICD-10-PCS | Mod: ,,, | Performed by: UROLOGY

## 2021-10-15 PROCEDURE — C1758 CATHETER, URETERAL: HCPCS | Performed by: UROLOGY

## 2021-10-15 PROCEDURE — 74420 PR  X-RAY RETROGRADE PYELOGRAM: ICD-10-PCS | Mod: 26,,, | Performed by: UROLOGY

## 2021-10-15 PROCEDURE — 25500020 PHARM REV CODE 255: Performed by: UROLOGY

## 2021-10-15 PROCEDURE — 37000009 HC ANESTHESIA EA ADD 15 MINS: Performed by: UROLOGY

## 2021-10-15 PROCEDURE — 36000707: Performed by: UROLOGY

## 2021-10-15 PROCEDURE — D9220A PRA ANESTHESIA: Mod: ANES,,, | Performed by: STUDENT IN AN ORGANIZED HEALTH CARE EDUCATION/TRAINING PROGRAM

## 2021-10-15 PROCEDURE — 74420 UROGRAPHY RTRGR +-KUB: CPT | Mod: 26,,, | Performed by: UROLOGY

## 2021-10-15 PROCEDURE — D9220A PRA ANESTHESIA: ICD-10-PCS | Mod: CRNA,,, | Performed by: NURSE ANESTHETIST, CERTIFIED REGISTERED

## 2021-10-15 PROCEDURE — 71000044 HC DOSC ROUTINE RECOVERY FIRST HOUR: Performed by: UROLOGY

## 2021-10-15 PROCEDURE — 36000706: Performed by: UROLOGY

## 2021-10-15 PROCEDURE — D9220A PRA ANESTHESIA: Mod: CRNA,,, | Performed by: NURSE ANESTHETIST, CERTIFIED REGISTERED

## 2021-10-15 RX ORDER — AMOXICILLIN AND CLAVULANATE POTASSIUM 875; 125 MG/1; MG/1
1 TABLET, FILM COATED ORAL 2 TIMES DAILY
Qty: 14 TABLET | Refills: 0 | Status: SHIPPED | OUTPATIENT
Start: 2021-10-15 | End: 2021-10-22

## 2021-10-15 RX ORDER — FENTANYL CITRATE 50 UG/ML
25 INJECTION, SOLUTION INTRAMUSCULAR; INTRAVENOUS EVERY 5 MIN PRN
Status: DISCONTINUED | OUTPATIENT
Start: 2021-10-15 | End: 2021-10-15 | Stop reason: HOSPADM

## 2021-10-15 RX ORDER — SODIUM CHLORIDE 0.9 % (FLUSH) 0.9 %
10 SYRINGE (ML) INJECTION
Status: DISCONTINUED | OUTPATIENT
Start: 2021-10-15 | End: 2021-10-15 | Stop reason: HOSPADM

## 2021-10-15 RX ORDER — EPHEDRINE SULFATE 50 MG/ML
INJECTION, SOLUTION INTRAVENOUS
Status: DISCONTINUED | OUTPATIENT
Start: 2021-10-15 | End: 2021-10-15

## 2021-10-15 RX ORDER — HYDROCODONE BITARTRATE AND ACETAMINOPHEN 5; 325 MG/1; MG/1
1 TABLET ORAL EVERY 4 HOURS PRN
Status: DISCONTINUED | OUTPATIENT
Start: 2021-10-15 | End: 2021-10-15 | Stop reason: HOSPADM

## 2021-10-15 RX ORDER — ONDANSETRON 2 MG/ML
INJECTION INTRAMUSCULAR; INTRAVENOUS
Status: DISCONTINUED | OUTPATIENT
Start: 2021-10-15 | End: 2021-10-15

## 2021-10-15 RX ORDER — OXYCODONE HYDROCHLORIDE 5 MG/1
5 TABLET ORAL
Status: DISCONTINUED | OUTPATIENT
Start: 2021-10-15 | End: 2021-10-15 | Stop reason: HOSPADM

## 2021-10-15 RX ORDER — OXYCODONE AND ACETAMINOPHEN 5; 325 MG/1; MG/1
1 TABLET ORAL EVERY 4 HOURS PRN
Qty: 5 TABLET | Refills: 0 | Status: SHIPPED | OUTPATIENT
Start: 2021-10-15 | End: 2021-12-15 | Stop reason: ALTCHOICE

## 2021-10-15 RX ORDER — PROPOFOL 10 MG/ML
VIAL (ML) INTRAVENOUS
Status: DISCONTINUED | OUTPATIENT
Start: 2021-10-15 | End: 2021-10-15

## 2021-10-15 RX ORDER — DEXMEDETOMIDINE HYDROCHLORIDE 100 UG/ML
INJECTION, SOLUTION INTRAVENOUS
Status: DISCONTINUED | OUTPATIENT
Start: 2021-10-15 | End: 2021-10-15

## 2021-10-15 RX ORDER — OXYBUTYNIN CHLORIDE 5 MG/1
5 TABLET ORAL 3 TIMES DAILY PRN
Qty: 30 TABLET | Refills: 0 | Status: SHIPPED | OUTPATIENT
Start: 2021-10-15 | End: 2021-10-25

## 2021-10-15 RX ORDER — LIDOCAINE HYDROCHLORIDE 20 MG/ML
INJECTION, SOLUTION EPIDURAL; INFILTRATION; INTRACAUDAL; PERINEURAL
Status: DISCONTINUED | OUTPATIENT
Start: 2021-10-15 | End: 2021-10-15

## 2021-10-15 RX ORDER — DEXAMETHASONE SODIUM PHOSPHATE 4 MG/ML
INJECTION, SOLUTION INTRA-ARTICULAR; INTRALESIONAL; INTRAMUSCULAR; INTRAVENOUS; SOFT TISSUE
Status: DISCONTINUED | OUTPATIENT
Start: 2021-10-15 | End: 2021-10-15

## 2021-10-15 RX ORDER — FAMOTIDINE 10 MG/ML
INJECTION INTRAVENOUS
Status: DISCONTINUED | OUTPATIENT
Start: 2021-10-15 | End: 2021-10-15

## 2021-10-15 RX ORDER — MIDAZOLAM HYDROCHLORIDE 1 MG/ML
INJECTION, SOLUTION INTRAMUSCULAR; INTRAVENOUS
Status: DISCONTINUED | OUTPATIENT
Start: 2021-10-15 | End: 2021-10-15

## 2021-10-15 RX ORDER — HALOPERIDOL 5 MG/ML
0.5 INJECTION INTRAMUSCULAR EVERY 10 MIN PRN
Status: DISCONTINUED | OUTPATIENT
Start: 2021-10-15 | End: 2021-10-15 | Stop reason: HOSPADM

## 2021-10-15 RX ORDER — FENTANYL CITRATE 50 UG/ML
INJECTION, SOLUTION INTRAMUSCULAR; INTRAVENOUS
Status: DISCONTINUED | OUTPATIENT
Start: 2021-10-15 | End: 2021-10-15

## 2021-10-15 RX ORDER — GENTAMICIN SULFATE 100 MG/100ML
240 INJECTION, SOLUTION INTRAVENOUS
Status: COMPLETED | OUTPATIENT
Start: 2021-10-15 | End: 2021-10-15

## 2021-10-15 RX ADMIN — MIDAZOLAM HYDROCHLORIDE 2 MG: 1 INJECTION, SOLUTION INTRAMUSCULAR; INTRAVENOUS at 01:10

## 2021-10-15 RX ADMIN — FAMOTIDINE 20 MG: 10 INJECTION INTRAVENOUS at 01:10

## 2021-10-15 RX ADMIN — FENTANYL CITRATE 50 MCG: 50 INJECTION INTRAMUSCULAR; INTRAVENOUS at 01:10

## 2021-10-15 RX ADMIN — EPHEDRINE SULFATE 7.5 MG: 50 INJECTION INTRAVENOUS at 01:10

## 2021-10-15 RX ADMIN — SODIUM CHLORIDE: 0.9 INJECTION, SOLUTION INTRAVENOUS at 01:10

## 2021-10-15 RX ADMIN — DEXMEDETOMIDINE HYDROCHLORIDE 8 MCG: 100 INJECTION, SOLUTION INTRAVENOUS at 01:10

## 2021-10-15 RX ADMIN — GENTAMICIN SULFATE 240 MG: 40 INJECTION, SOLUTION INTRAMUSCULAR; INTRAVENOUS at 01:10

## 2021-10-15 RX ADMIN — DEXAMETHASONE SODIUM PHOSPHATE 4 MG: 4 INJECTION INTRA-ARTICULAR; INTRALESIONAL; INTRAMUSCULAR; INTRAVENOUS; SOFT TISSUE at 01:10

## 2021-10-15 RX ADMIN — AMPICILLIN 1 G: 1 INJECTION, POWDER, FOR SOLUTION INTRAMUSCULAR; INTRAVENOUS at 01:10

## 2021-10-15 RX ADMIN — ONDANSETRON 4 MG: 2 INJECTION INTRAMUSCULAR; INTRAVENOUS at 01:10

## 2021-10-15 RX ADMIN — LIDOCAINE HYDROCHLORIDE 100 MG: 20 INJECTION, SOLUTION EPIDURAL; INFILTRATION; INTRACAUDAL at 01:10

## 2021-10-15 RX ADMIN — PROPOFOL 150 MG: 10 INJECTION, EMULSION INTRAVENOUS at 01:10

## 2021-10-22 ENCOUNTER — OFFICE VISIT (OUTPATIENT)
Dept: UROLOGY | Facility: CLINIC | Age: 71
End: 2021-10-22
Payer: COMMERCIAL

## 2021-10-22 DIAGNOSIS — Z98.890 POST-OPERATIVE STATE: Primary | ICD-10-CM

## 2021-10-22 DIAGNOSIS — N35.819 OTHER STRICTURE OF URETHRA IN MALE: ICD-10-CM

## 2021-10-22 DIAGNOSIS — Z46.6 ENCOUNTER FOR FOLEY CATHETER REMOVAL: ICD-10-CM

## 2021-10-22 DIAGNOSIS — N35.914 ANTERIOR URETHRAL STRICTURE: ICD-10-CM

## 2021-10-22 PROCEDURE — 1160F PR REVIEW ALL MEDS BY PRESCRIBER/CLIN PHARMACIST DOCUMENTED: ICD-10-PCS | Mod: CPTII,S$GLB,, | Performed by: NURSE PRACTITIONER

## 2021-10-22 PROCEDURE — 3288F FALL RISK ASSESSMENT DOCD: CPT | Mod: CPTII,S$GLB,, | Performed by: NURSE PRACTITIONER

## 2021-10-22 PROCEDURE — 1160F RVW MEDS BY RX/DR IN RCRD: CPT | Mod: CPTII,S$GLB,, | Performed by: NURSE PRACTITIONER

## 2021-10-22 PROCEDURE — 3288F PR FALLS RISK ASSESSMENT DOCUMENTED: ICD-10-PCS | Mod: CPTII,S$GLB,, | Performed by: NURSE PRACTITIONER

## 2021-10-22 PROCEDURE — 1101F PT FALLS ASSESS-DOCD LE1/YR: CPT | Mod: CPTII,S$GLB,, | Performed by: NURSE PRACTITIONER

## 2021-10-22 PROCEDURE — 1159F PR MEDICATION LIST DOCUMENTED IN MEDICAL RECORD: ICD-10-PCS | Mod: CPTII,S$GLB,, | Performed by: NURSE PRACTITIONER

## 2021-10-22 PROCEDURE — 99024 PR POST-OP FOLLOW-UP VISIT: ICD-10-PCS | Mod: S$GLB,,, | Performed by: NURSE PRACTITIONER

## 2021-10-22 PROCEDURE — 99999 PR PBB SHADOW E&M-EST. PATIENT-LVL IV: CPT | Mod: PBBFAC,,, | Performed by: NURSE PRACTITIONER

## 2021-10-22 PROCEDURE — 1159F MED LIST DOCD IN RCRD: CPT | Mod: CPTII,S$GLB,, | Performed by: NURSE PRACTITIONER

## 2021-10-22 PROCEDURE — 99999 PR PBB SHADOW E&M-EST. PATIENT-LVL IV: ICD-10-PCS | Mod: PBBFAC,,, | Performed by: NURSE PRACTITIONER

## 2021-10-22 PROCEDURE — 1101F PR PT FALLS ASSESS DOC 0-1 FALLS W/OUT INJ PAST YR: ICD-10-PCS | Mod: CPTII,S$GLB,, | Performed by: NURSE PRACTITIONER

## 2021-10-22 PROCEDURE — 4010F ACE/ARB THERAPY RXD/TAKEN: CPT | Mod: CPTII,S$GLB,, | Performed by: NURSE PRACTITIONER

## 2021-10-22 PROCEDURE — 4010F PR ACE/ARB THEARPY RXD/TAKEN: ICD-10-PCS | Mod: CPTII,S$GLB,, | Performed by: NURSE PRACTITIONER

## 2021-10-22 PROCEDURE — 99024 POSTOP FOLLOW-UP VISIT: CPT | Mod: S$GLB,,, | Performed by: NURSE PRACTITIONER

## 2021-10-29 RX ORDER — SULFAMETHOXAZOLE AND TRIMETHOPRIM 800; 160 MG/1; MG/1
1 TABLET ORAL 2 TIMES DAILY
Qty: 20 TABLET | Refills: 0 | Status: SHIPPED | OUTPATIENT
Start: 2021-10-29 | End: 2021-11-08

## 2021-11-08 ENCOUNTER — PATIENT MESSAGE (OUTPATIENT)
Dept: UROLOGY | Facility: CLINIC | Age: 71
End: 2021-11-08
Payer: COMMERCIAL

## 2021-11-18 ENCOUNTER — OFFICE VISIT (OUTPATIENT)
Dept: UROLOGY | Facility: CLINIC | Age: 71
End: 2021-11-18
Payer: COMMERCIAL

## 2021-11-18 VITALS
BODY MASS INDEX: 27.7 KG/M2 | HEART RATE: 82 BPM | HEIGHT: 73 IN | DIASTOLIC BLOOD PRESSURE: 78 MMHG | SYSTOLIC BLOOD PRESSURE: 114 MMHG | WEIGHT: 209 LBS

## 2021-11-18 DIAGNOSIS — N35.911 STRICTURE OF URETHRAL MEATUS IN MALE, UNSPECIFIED STRICTURE TYPE: Primary | ICD-10-CM

## 2021-11-18 PROCEDURE — 1159F PR MEDICATION LIST DOCUMENTED IN MEDICAL RECORD: ICD-10-PCS | Mod: CPTII,S$GLB,, | Performed by: UROLOGY

## 2021-11-18 PROCEDURE — 1101F PT FALLS ASSESS-DOCD LE1/YR: CPT | Mod: CPTII,S$GLB,, | Performed by: UROLOGY

## 2021-11-18 PROCEDURE — 1159F MED LIST DOCD IN RCRD: CPT | Mod: CPTII,S$GLB,, | Performed by: UROLOGY

## 2021-11-18 PROCEDURE — 1160F PR REVIEW ALL MEDS BY PRESCRIBER/CLIN PHARMACIST DOCUMENTED: ICD-10-PCS | Mod: CPTII,S$GLB,, | Performed by: UROLOGY

## 2021-11-18 PROCEDURE — 3008F BODY MASS INDEX DOCD: CPT | Mod: CPTII,S$GLB,, | Performed by: UROLOGY

## 2021-11-18 PROCEDURE — 3074F PR MOST RECENT SYSTOLIC BLOOD PRESSURE < 130 MM HG: ICD-10-PCS | Mod: CPTII,S$GLB,, | Performed by: UROLOGY

## 2021-11-18 PROCEDURE — 99999 PR PBB SHADOW E&M-EST. PATIENT-LVL IV: CPT | Mod: PBBFAC,,, | Performed by: UROLOGY

## 2021-11-18 PROCEDURE — 4010F PR ACE/ARB THEARPY RXD/TAKEN: ICD-10-PCS | Mod: CPTII,S$GLB,, | Performed by: UROLOGY

## 2021-11-18 PROCEDURE — 4010F ACE/ARB THERAPY RXD/TAKEN: CPT | Mod: CPTII,S$GLB,, | Performed by: UROLOGY

## 2021-11-18 PROCEDURE — 1160F RVW MEDS BY RX/DR IN RCRD: CPT | Mod: CPTII,S$GLB,, | Performed by: UROLOGY

## 2021-11-18 PROCEDURE — 3078F PR MOST RECENT DIASTOLIC BLOOD PRESSURE < 80 MM HG: ICD-10-PCS | Mod: CPTII,S$GLB,, | Performed by: UROLOGY

## 2021-11-18 PROCEDURE — 3288F FALL RISK ASSESSMENT DOCD: CPT | Mod: CPTII,S$GLB,, | Performed by: UROLOGY

## 2021-11-18 PROCEDURE — 99213 OFFICE O/P EST LOW 20 MIN: CPT | Mod: S$GLB,,, | Performed by: UROLOGY

## 2021-11-18 PROCEDURE — 1126F AMNT PAIN NOTED NONE PRSNT: CPT | Mod: CPTII,S$GLB,, | Performed by: UROLOGY

## 2021-11-18 PROCEDURE — 3074F SYST BP LT 130 MM HG: CPT | Mod: CPTII,S$GLB,, | Performed by: UROLOGY

## 2021-11-18 PROCEDURE — 3078F DIAST BP <80 MM HG: CPT | Mod: CPTII,S$GLB,, | Performed by: UROLOGY

## 2021-11-18 PROCEDURE — 99213 PR OFFICE/OUTPT VISIT, EST, LEVL III, 20-29 MIN: ICD-10-PCS | Mod: S$GLB,,, | Performed by: UROLOGY

## 2021-11-18 PROCEDURE — 3008F PR BODY MASS INDEX (BMI) DOCUMENTED: ICD-10-PCS | Mod: CPTII,S$GLB,, | Performed by: UROLOGY

## 2021-11-18 PROCEDURE — 3288F PR FALLS RISK ASSESSMENT DOCUMENTED: ICD-10-PCS | Mod: CPTII,S$GLB,, | Performed by: UROLOGY

## 2021-11-18 PROCEDURE — 1126F PR PAIN SEVERITY QUANTIFIED, NO PAIN PRESENT: ICD-10-PCS | Mod: CPTII,S$GLB,, | Performed by: UROLOGY

## 2021-11-18 PROCEDURE — 1101F PR PT FALLS ASSESS DOC 0-1 FALLS W/OUT INJ PAST YR: ICD-10-PCS | Mod: CPTII,S$GLB,, | Performed by: UROLOGY

## 2021-11-18 PROCEDURE — 99999 PR PBB SHADOW E&M-EST. PATIENT-LVL IV: ICD-10-PCS | Mod: PBBFAC,,, | Performed by: UROLOGY

## 2021-11-19 NOTE — PATIENT INSTRUCTIONS
Continue watching diet closely   Continue all current medications   Avoid excessive sugars and carbohydrates in diet    No

## 2021-12-02 DIAGNOSIS — R73.03 PREDIABETES: ICD-10-CM

## 2021-12-02 DIAGNOSIS — I10 ESSENTIAL HYPERTENSION: Primary | ICD-10-CM

## 2021-12-03 ENCOUNTER — TELEPHONE (OUTPATIENT)
Dept: FAMILY MEDICINE | Facility: CLINIC | Age: 71
End: 2021-12-03
Payer: COMMERCIAL

## 2021-12-03 DIAGNOSIS — E55.9 VITAMIN D DEFICIENCY: Primary | ICD-10-CM

## 2021-12-07 NOTE — TELEPHONE ENCOUNTER
Orders signed - please schedule.  I sent the Rx to the pharmacy.  Recommend patient get flu shot at pharmacy prior to office visit.   rib pain/injury

## 2021-12-08 DIAGNOSIS — F41.9 MILD ANXIETY: ICD-10-CM

## 2021-12-08 DIAGNOSIS — J30.9 ALLERGIC RHINITIS, UNSPECIFIED SEASONALITY, UNSPECIFIED TRIGGER: ICD-10-CM

## 2021-12-08 DIAGNOSIS — R05.3 CHRONIC COUGH: ICD-10-CM

## 2021-12-08 DIAGNOSIS — I10 ESSENTIAL HYPERTENSION: ICD-10-CM

## 2021-12-08 DIAGNOSIS — I70.0 AORTIC ARCH ATHEROSCLEROSIS: ICD-10-CM

## 2021-12-08 RX ORDER — ALBUTEROL SULFATE 90 UG/1
2 AEROSOL, METERED RESPIRATORY (INHALATION) EVERY 6 HOURS PRN
Qty: 18 G | Refills: 0 | Status: CANCELLED | OUTPATIENT
Start: 2021-12-08

## 2021-12-08 RX ORDER — LOSARTAN POTASSIUM 100 MG/1
100 TABLET ORAL DAILY
Qty: 90 TABLET | Refills: 0 | Status: SHIPPED | OUTPATIENT
Start: 2021-12-08 | End: 2022-03-23 | Stop reason: SDUPTHER

## 2021-12-08 RX ORDER — ATORVASTATIN CALCIUM 10 MG/1
10 TABLET, FILM COATED ORAL DAILY
Qty: 90 TABLET | Refills: 0 | Status: SHIPPED | OUTPATIENT
Start: 2021-12-08 | End: 2022-03-23 | Stop reason: SDUPTHER

## 2021-12-08 RX ORDER — CITALOPRAM 10 MG/1
10 TABLET ORAL DAILY
Qty: 90 TABLET | Refills: 0 | Status: SHIPPED | OUTPATIENT
Start: 2021-12-08 | End: 2021-12-15

## 2021-12-08 RX ORDER — FLUTICASONE PROPIONATE 50 MCG
1 SPRAY, SUSPENSION (ML) NASAL DAILY
Qty: 48 G | Refills: 0 | Status: SHIPPED | OUTPATIENT
Start: 2021-12-08 | End: 2023-11-20 | Stop reason: SDUPTHER

## 2021-12-08 RX ORDER — AMLODIPINE BESYLATE 5 MG/1
TABLET ORAL
Qty: 90 TABLET | Refills: 0 | Status: SHIPPED | OUTPATIENT
Start: 2021-12-08 | End: 2022-03-23 | Stop reason: SDUPTHER

## 2021-12-13 ENCOUNTER — LAB VISIT (OUTPATIENT)
Dept: LAB | Facility: HOSPITAL | Age: 71
End: 2021-12-13
Attending: INTERNAL MEDICINE
Payer: COMMERCIAL

## 2021-12-13 DIAGNOSIS — I10 ESSENTIAL HYPERTENSION: ICD-10-CM

## 2021-12-13 DIAGNOSIS — R73.03 PREDIABETES: ICD-10-CM

## 2021-12-13 LAB
ALBUMIN SERPL BCP-MCNC: 4 G/DL (ref 3.5–5.2)
ALP SERPL-CCNC: 69 U/L (ref 55–135)
ALT SERPL W/O P-5'-P-CCNC: 13 U/L (ref 10–44)
ANION GAP SERPL CALC-SCNC: 14 MMOL/L (ref 8–16)
AST SERPL-CCNC: 18 U/L (ref 10–40)
BASOPHILS # BLD AUTO: 0.06 K/UL (ref 0–0.2)
BASOPHILS NFR BLD: 0.9 % (ref 0–1.9)
BILIRUB SERPL-MCNC: 0.4 MG/DL (ref 0.1–1)
BUN SERPL-MCNC: 17 MG/DL (ref 8–23)
CALCIUM SERPL-MCNC: 9.5 MG/DL (ref 8.7–10.5)
CHLORIDE SERPL-SCNC: 106 MMOL/L (ref 95–110)
CHOLEST SERPL-MCNC: 146 MG/DL (ref 120–199)
CHOLEST/HDLC SERPL: 3.1 {RATIO} (ref 2–5)
CO2 SERPL-SCNC: 17 MMOL/L (ref 23–29)
CREAT SERPL-MCNC: 1 MG/DL (ref 0.5–1.4)
DIFFERENTIAL METHOD: ABNORMAL
EOSINOPHIL # BLD AUTO: 0.3 K/UL (ref 0–0.5)
EOSINOPHIL NFR BLD: 4.1 % (ref 0–8)
ERYTHROCYTE [DISTWIDTH] IN BLOOD BY AUTOMATED COUNT: 19.4 % (ref 11.5–14.5)
EST. GFR  (AFRICAN AMERICAN): >60 ML/MIN/1.73 M^2
EST. GFR  (NON AFRICAN AMERICAN): >60 ML/MIN/1.73 M^2
ESTIMATED AVG GLUCOSE: 117 MG/DL (ref 68–131)
GLUCOSE SERPL-MCNC: 94 MG/DL (ref 70–110)
HBA1C MFR BLD: 5.7 % (ref 4–5.6)
HCT VFR BLD AUTO: 37.4 % (ref 40–54)
HDLC SERPL-MCNC: 47 MG/DL (ref 40–75)
HDLC SERPL: 32.2 % (ref 20–50)
HGB BLD-MCNC: 11.4 G/DL (ref 14–18)
IMM GRANULOCYTES # BLD AUTO: 0.03 K/UL (ref 0–0.04)
IMM GRANULOCYTES NFR BLD AUTO: 0.5 % (ref 0–0.5)
LDLC SERPL CALC-MCNC: 83.6 MG/DL (ref 63–159)
LYMPHOCYTES # BLD AUTO: 1.5 K/UL (ref 1–4.8)
LYMPHOCYTES NFR BLD: 23.7 % (ref 18–48)
MCH RBC QN AUTO: 25.7 PG (ref 27–31)
MCHC RBC AUTO-ENTMCNC: 30.5 G/DL (ref 32–36)
MCV RBC AUTO: 84 FL (ref 82–98)
MONOCYTES # BLD AUTO: 0.8 K/UL (ref 0.3–1)
MONOCYTES NFR BLD: 11.9 % (ref 4–15)
NEUTROPHILS # BLD AUTO: 3.7 K/UL (ref 1.8–7.7)
NEUTROPHILS NFR BLD: 58.9 % (ref 38–73)
NONHDLC SERPL-MCNC: 99 MG/DL
NRBC BLD-RTO: 0 /100 WBC
PLATELET # BLD AUTO: 325 K/UL (ref 150–450)
PMV BLD AUTO: 8.9 FL (ref 9.2–12.9)
POTASSIUM SERPL-SCNC: 4.8 MMOL/L (ref 3.5–5.1)
PROT SERPL-MCNC: 7.6 G/DL (ref 6–8.4)
RBC # BLD AUTO: 4.44 M/UL (ref 4.6–6.2)
SODIUM SERPL-SCNC: 137 MMOL/L (ref 136–145)
TRIGL SERPL-MCNC: 77 MG/DL (ref 30–150)
WBC # BLD AUTO: 6.36 K/UL (ref 3.9–12.7)

## 2021-12-13 PROCEDURE — 85025 COMPLETE CBC W/AUTO DIFF WBC: CPT | Performed by: INTERNAL MEDICINE

## 2021-12-13 PROCEDURE — 80061 LIPID PANEL: CPT | Performed by: INTERNAL MEDICINE

## 2021-12-13 PROCEDURE — 83036 HEMOGLOBIN GLYCOSYLATED A1C: CPT | Performed by: INTERNAL MEDICINE

## 2021-12-13 PROCEDURE — 36415 COLL VENOUS BLD VENIPUNCTURE: CPT | Mod: PO | Performed by: INTERNAL MEDICINE

## 2021-12-13 PROCEDURE — 80053 COMPREHEN METABOLIC PANEL: CPT | Performed by: INTERNAL MEDICINE

## 2021-12-15 ENCOUNTER — OFFICE VISIT (OUTPATIENT)
Dept: FAMILY MEDICINE | Facility: CLINIC | Age: 71
End: 2021-12-15
Payer: COMMERCIAL

## 2021-12-15 VITALS
SYSTOLIC BLOOD PRESSURE: 118 MMHG | WEIGHT: 211.44 LBS | OXYGEN SATURATION: 97 % | HEART RATE: 83 BPM | DIASTOLIC BLOOD PRESSURE: 62 MMHG | HEIGHT: 73 IN | BODY MASS INDEX: 28.02 KG/M2 | TEMPERATURE: 98 F

## 2021-12-15 DIAGNOSIS — K21.9 GASTROESOPHAGEAL REFLUX DISEASE WITHOUT ESOPHAGITIS: ICD-10-CM

## 2021-12-15 DIAGNOSIS — R73.03 PREDIABETES: ICD-10-CM

## 2021-12-15 DIAGNOSIS — R91.8 PULMONARY NODULES/LESIONS, MULTIPLE: ICD-10-CM

## 2021-12-15 DIAGNOSIS — Z23 FLU VACCINE NEED: ICD-10-CM

## 2021-12-15 DIAGNOSIS — E55.9 VITAMIN D DEFICIENCY: ICD-10-CM

## 2021-12-15 DIAGNOSIS — I10 ESSENTIAL HYPERTENSION: ICD-10-CM

## 2021-12-15 DIAGNOSIS — F41.9 MILD ANXIETY: ICD-10-CM

## 2021-12-15 DIAGNOSIS — E66.3 OVERWEIGHT (BMI 25.0-29.9): ICD-10-CM

## 2021-12-15 DIAGNOSIS — Z87.891 HISTORY OF TOBACCO ABUSE: ICD-10-CM

## 2021-12-15 DIAGNOSIS — I70.0 AORTIC ARCH ATHEROSCLEROSIS: ICD-10-CM

## 2021-12-15 DIAGNOSIS — I73.9 ASYMPTOMATIC PVD (PERIPHERAL VASCULAR DISEASE): ICD-10-CM

## 2021-12-15 DIAGNOSIS — R05.3 CHRONIC COUGH: ICD-10-CM

## 2021-12-15 DIAGNOSIS — M85.859 OSTEOPENIA OF NECK OF FEMUR, UNSPECIFIED LATERALITY: ICD-10-CM

## 2021-12-15 DIAGNOSIS — Z00.00 ROUTINE MEDICAL EXAM: Primary | ICD-10-CM

## 2021-12-15 PROCEDURE — 99999 PR PBB SHADOW E&M-EST. PATIENT-LVL IV: CPT | Mod: PBBFAC,,, | Performed by: INTERNAL MEDICINE

## 2021-12-15 PROCEDURE — 99999 PR PBB SHADOW E&M-EST. PATIENT-LVL IV: ICD-10-PCS | Mod: PBBFAC,,, | Performed by: INTERNAL MEDICINE

## 2021-12-15 PROCEDURE — 90694 FLU VACCINE - QUADRIVALENT - ADJUVANTED: ICD-10-PCS | Mod: S$GLB,,, | Performed by: INTERNAL MEDICINE

## 2021-12-15 PROCEDURE — 90471 FLU VACCINE - QUADRIVALENT - ADJUVANTED: ICD-10-PCS | Mod: S$GLB,,, | Performed by: INTERNAL MEDICINE

## 2021-12-15 PROCEDURE — 99397 PR PREVENTIVE VISIT,EST,65 & OVER: ICD-10-PCS | Mod: 25,S$GLB,, | Performed by: INTERNAL MEDICINE

## 2021-12-15 PROCEDURE — 99397 PER PM REEVAL EST PAT 65+ YR: CPT | Mod: 25,S$GLB,, | Performed by: INTERNAL MEDICINE

## 2021-12-15 PROCEDURE — 90694 VACC AIIV4 NO PRSRV 0.5ML IM: CPT | Mod: S$GLB,,, | Performed by: INTERNAL MEDICINE

## 2021-12-15 PROCEDURE — 90471 IMMUNIZATION ADMIN: CPT | Mod: S$GLB,,, | Performed by: INTERNAL MEDICINE

## 2021-12-15 PROCEDURE — 4010F PR ACE/ARB THEARPY RXD/TAKEN: ICD-10-PCS | Mod: CPTII,S$GLB,, | Performed by: INTERNAL MEDICINE

## 2021-12-15 PROCEDURE — 4010F ACE/ARB THERAPY RXD/TAKEN: CPT | Mod: CPTII,S$GLB,, | Performed by: INTERNAL MEDICINE

## 2021-12-15 RX ORDER — CITALOPRAM 20 MG/1
20 TABLET, FILM COATED ORAL DAILY
Qty: 90 TABLET | Refills: 1 | Status: SHIPPED | OUTPATIENT
Start: 2021-12-15 | End: 2022-03-23 | Stop reason: SDUPTHER

## 2021-12-15 RX ORDER — OMEPRAZOLE 20 MG/1
20 TABLET, DELAYED RELEASE ORAL DAILY PRN
COMMUNITY
End: 2023-11-02 | Stop reason: ALTCHOICE

## 2021-12-17 ENCOUNTER — PATIENT MESSAGE (OUTPATIENT)
Dept: UROLOGY | Facility: CLINIC | Age: 71
End: 2021-12-17
Payer: COMMERCIAL

## 2021-12-17 ENCOUNTER — HOSPITAL ENCOUNTER (OUTPATIENT)
Dept: RADIOLOGY | Facility: HOSPITAL | Age: 71
Discharge: HOME | End: 2021-12-17
Attending: INTERNAL MEDICINE
Payer: COMMERCIAL

## 2021-12-17 DIAGNOSIS — Z87.891 HISTORY OF TOBACCO ABUSE: ICD-10-CM

## 2021-12-17 PROCEDURE — 71271 CT THORAX LUNG CANCER SCR C-: CPT | Mod: TC

## 2021-12-17 PROCEDURE — 71271 CT CHEST LUNG SCREENING LOW DOSE: ICD-10-PCS | Mod: 26,,, | Performed by: RADIOLOGY

## 2021-12-17 PROCEDURE — 71271 CT THORAX LUNG CANCER SCR C-: CPT | Mod: 26,,, | Performed by: RADIOLOGY

## 2021-12-28 ENCOUNTER — TELEPHONE (OUTPATIENT)
Dept: UROLOGY | Facility: CLINIC | Age: 71
End: 2021-12-28
Payer: COMMERCIAL

## 2022-01-15 ENCOUNTER — PATIENT MESSAGE (OUTPATIENT)
Dept: UROLOGY | Facility: CLINIC | Age: 72
End: 2022-01-15

## 2022-01-31 ENCOUNTER — HOSPITAL ENCOUNTER (OUTPATIENT)
Dept: RADIOLOGY | Facility: CLINIC | Age: 72
Discharge: HOME | End: 2022-01-31
Attending: INTERNAL MEDICINE
Payer: COMMERCIAL

## 2022-01-31 DIAGNOSIS — E55.9 VITAMIN D DEFICIENCY: ICD-10-CM

## 2022-01-31 PROCEDURE — 77080 DEXA BONE DENSITY SPINE HIP: ICD-10-PCS | Mod: 26,,, | Performed by: INTERNAL MEDICINE

## 2022-01-31 PROCEDURE — 77080 DXA BONE DENSITY AXIAL: CPT | Mod: 26,,, | Performed by: INTERNAL MEDICINE

## 2022-01-31 PROCEDURE — 77080 DXA BONE DENSITY AXIAL: CPT | Mod: TC,PO

## 2022-02-07 ENCOUNTER — TELEPHONE (OUTPATIENT)
Dept: FAMILY MEDICINE | Facility: CLINIC | Age: 72
End: 2022-02-07
Payer: MEDICARE

## 2022-02-07 DIAGNOSIS — M85.859 OSTEOPENIA OF NECK OF FEMUR, UNSPECIFIED LATERALITY: ICD-10-CM

## 2022-02-07 RX ORDER — SODIUM CHLORIDE 0.9 % (FLUSH) 0.9 %
10 SYRINGE (ML) INJECTION
Status: CANCELLED | OUTPATIENT
Start: 2022-02-07

## 2022-02-07 RX ORDER — ZOLEDRONIC ACID 5 MG/100ML
5 INJECTION, SOLUTION INTRAVENOUS
Status: CANCELLED | OUTPATIENT
Start: 2022-02-07

## 2022-02-07 RX ORDER — HEPARIN 100 UNIT/ML
500 SYRINGE INTRAVENOUS
Status: CANCELLED | OUTPATIENT
Start: 2022-02-07

## 2022-02-07 NOTE — TELEPHONE ENCOUNTER
Please call patient: BMD shows persistent thinning of the bones. He is due for his yearly Reclast infusion. Someone will call him to schedule.

## 2022-02-08 NOTE — TELEPHONE ENCOUNTER
Notified pt of BMD result & informed of expecting call from Reclast infusion. Verbalized understanding & thank you.

## 2022-02-09 ENCOUNTER — IMMUNIZATION (OUTPATIENT)
Dept: PHARMACY | Facility: CLINIC | Age: 72
End: 2022-02-09
Payer: MEDICARE

## 2022-02-09 DIAGNOSIS — Z23 NEED FOR VACCINATION: Primary | ICD-10-CM

## 2022-02-21 ENCOUNTER — OFFICE VISIT (OUTPATIENT)
Dept: UROLOGY | Facility: CLINIC | Age: 72
End: 2022-02-21
Payer: MEDICARE

## 2022-02-21 VITALS
BODY MASS INDEX: 27.2 KG/M2 | SYSTOLIC BLOOD PRESSURE: 120 MMHG | HEIGHT: 73 IN | WEIGHT: 205.25 LBS | HEART RATE: 126 BPM | DIASTOLIC BLOOD PRESSURE: 77 MMHG

## 2022-02-21 DIAGNOSIS — N40.1 BPH WITH URINARY OBSTRUCTION: ICD-10-CM

## 2022-02-21 DIAGNOSIS — N35.919 STRICTURE OF MALE URETHRA, UNSPECIFIED STRICTURE TYPE: Primary | ICD-10-CM

## 2022-02-21 DIAGNOSIS — N13.8 BPH WITH URINARY OBSTRUCTION: ICD-10-CM

## 2022-02-21 PROCEDURE — 1126F PR PAIN SEVERITY QUANTIFIED, NO PAIN PRESENT: ICD-10-PCS | Mod: HCNC,CPTII,S$GLB, | Performed by: UROLOGY

## 2022-02-21 PROCEDURE — 3074F PR MOST RECENT SYSTOLIC BLOOD PRESSURE < 130 MM HG: ICD-10-PCS | Mod: HCNC,CPTII,S$GLB, | Performed by: UROLOGY

## 2022-02-21 PROCEDURE — 3078F PR MOST RECENT DIASTOLIC BLOOD PRESSURE < 80 MM HG: ICD-10-PCS | Mod: HCNC,CPTII,S$GLB, | Performed by: UROLOGY

## 2022-02-21 PROCEDURE — 1126F AMNT PAIN NOTED NONE PRSNT: CPT | Mod: HCNC,CPTII,S$GLB, | Performed by: UROLOGY

## 2022-02-21 PROCEDURE — 99213 PR OFFICE/OUTPT VISIT, EST, LEVL III, 20-29 MIN: ICD-10-PCS | Mod: HCNC,S$GLB,, | Performed by: UROLOGY

## 2022-02-21 PROCEDURE — 3078F DIAST BP <80 MM HG: CPT | Mod: HCNC,CPTII,S$GLB, | Performed by: UROLOGY

## 2022-02-21 PROCEDURE — 99213 OFFICE O/P EST LOW 20 MIN: CPT | Mod: HCNC,S$GLB,, | Performed by: UROLOGY

## 2022-02-21 PROCEDURE — 1101F PR PT FALLS ASSESS DOC 0-1 FALLS W/OUT INJ PAST YR: ICD-10-PCS | Mod: HCNC,CPTII,S$GLB, | Performed by: UROLOGY

## 2022-02-21 PROCEDURE — 3008F BODY MASS INDEX DOCD: CPT | Mod: HCNC,CPTII,S$GLB, | Performed by: UROLOGY

## 2022-02-21 PROCEDURE — 1159F MED LIST DOCD IN RCRD: CPT | Mod: HCNC,CPTII,S$GLB, | Performed by: UROLOGY

## 2022-02-21 PROCEDURE — 1159F PR MEDICATION LIST DOCUMENTED IN MEDICAL RECORD: ICD-10-PCS | Mod: HCNC,CPTII,S$GLB, | Performed by: UROLOGY

## 2022-02-21 PROCEDURE — 1101F PT FALLS ASSESS-DOCD LE1/YR: CPT | Mod: HCNC,CPTII,S$GLB, | Performed by: UROLOGY

## 2022-02-21 PROCEDURE — 3288F PR FALLS RISK ASSESSMENT DOCUMENTED: ICD-10-PCS | Mod: HCNC,CPTII,S$GLB, | Performed by: UROLOGY

## 2022-02-21 PROCEDURE — 3288F FALL RISK ASSESSMENT DOCD: CPT | Mod: HCNC,CPTII,S$GLB, | Performed by: UROLOGY

## 2022-02-21 PROCEDURE — 3074F SYST BP LT 130 MM HG: CPT | Mod: HCNC,CPTII,S$GLB, | Performed by: UROLOGY

## 2022-02-21 PROCEDURE — 3008F PR BODY MASS INDEX (BMI) DOCUMENTED: ICD-10-PCS | Mod: HCNC,CPTII,S$GLB, | Performed by: UROLOGY

## 2022-02-21 PROCEDURE — 99999 PR PBB SHADOW E&M-EST. PATIENT-LVL III: ICD-10-PCS | Mod: PBBFAC,HCNC,, | Performed by: UROLOGY

## 2022-02-21 PROCEDURE — 99999 PR PBB SHADOW E&M-EST. PATIENT-LVL III: CPT | Mod: PBBFAC,HCNC,, | Performed by: UROLOGY

## 2022-02-21 NOTE — PROGRESS NOTES
Subjective:       Patient ID: Vinnie Scott is a 71 y.o. male.    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    Mild anxiety     Overweight(278.02)     Personal history of colonic polyps October 2010, May 2015    Prediabetes     Urinary anastomotic stricture        Past Surgical History:   Procedure Laterality Date    ADENOIDECTOMY      COLONOSCOPY N/A 5/31/2017    Procedure: COLONOSCOPY;  Surgeon: Fatoumata Cook MD;  Location: [facility];  Service: Endoscopy;  Laterality: N/A;    CYSTOGRAM N/A 10/15/2021    Procedure: CYSTOGRAM;  Surgeon: Eligio Culp Jr., MD;  Location: [address];  Service: Urology;  Laterality: N/A;    CYSTOSCOPY N/A 10/15/2021    Procedure: CYSTOSCOPY;  Surgeon: Eligio Culp Jr., MD;  Location: [facility] OR [address] [place] [address];  Service: Urology;  Laterality: N/A;    DILATION OF URETHRA N/A 10/15/2021    Procedure: DILATION, URETHRA;  Surgeon: Eligio Culp Jr., MD;  Location: [facility] OR [address] [place] [address];  Service: Urology;  Laterality: N/A;    FRACTURE SURGERY      Left collarbone    left ankle fracture repair Left 12/2018    TONSILLECTOMY      urinary stricture clearing      VASECTOMY         Family History   Problem Relation Age of Onset    Lung cancer Father     Diabetes Father     Uterine cancer Mother     Heart disease Mother     Drug abuse Brother     Alcohol abuse Brother     Prostate cancer Neg Hx     Colon cancer Neg Hx        Social History     Socioeconomic History    Marital status:     Number of children: 2   Occupational History     Occupation: 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[employer]     Employer: [employer]   Tobacco Use    Smoking status: Former Smoker     Packs/day: 1.00     Years: 45.00     Pack years: 45.00     Types: Cigarettes     Quit date: 9/20/2011     Years since quitting: 10.4    Smokeless tobacco: Never Used   Substance and Sexual Activity    Alcohol use: Yes     Comment: 4-5 beers daily    Drug use: No       Allergies:  Ciprofloxacin and Lisinopril    Medications:    Current Outpatient Medications:     albuterol (PROVENTIL/VENTOLIN HFA) 90 mcg/actuation inhaler, Inhale 2 puffs into the lungs every 6 (six) hours as needed for Wheezing or Shortness of Breath. Rescue, Disp: 18 g, Rfl: 0    amLODIPine (NORVASC) 5 MG tablet, TAKE 1 TABLET(5 MG) BY MOUTH EVERY DAY, Disp: 90 tablet, Rfl: 0    atorvastatin (LIPITOR) 10 MG tablet, Take 1 tablet (10 mg total) by mouth once daily., Disp: 90 tablet, Rfl: 0    citalopram (CELEXA) 20 MG tablet, Take 1 tablet (20 mg total) by mouth once daily., Disp: 90 tablet, Rfl: 1    fexofenadine (ALLEGRA) 180 MG tablet, Take 1 tablet (180 mg total) by mouth once daily. (Patient taking differently: Take 180 mg by mouth nightly.), Disp: 90 tablet, Rfl: 0    fluticasone propionate (FLONASE) 50 mcg/actuation nasal spray, 1 spray (50 mcg total) by Each Nostril route once daily., Disp: 48 g, Rfl: 0    losartan (COZAAR) 100 MG tablet, Take 1 tablet (100 mg total) by mouth once daily., Disp: 90 tablet, Rfl: 0    nystatin-triamcinolone (MYCOLOG II) cream, Apply topically 4 (four) times daily., Disp: 30 g, Rfl: 4    omeprazole (PRILOSEC OTC) 20 MG tablet, Take 20 mg by mouth daily as needed., Disp: , Rfl:     oxybutynin (DITROPAN) 5 MG Tab, Take 1 tablet (5 mg total) by mouth 3 (three) times daily as needed (bladder spasms)., Disp: 30 tablet, Rfl: 0    Review of Systems   Constitutional: Negative for activity change, appetite change, chills, diaphoresis, fatigue, fever and unexpected weight  change.   HENT: Negative for congestion, dental problem, hearing loss, mouth sores, postnasal drip, rhinorrhea, sinus pressure and trouble swallowing.    Eyes: Negative for pain, discharge and itching.   Respiratory: Negative for apnea, cough, choking, chest tightness, shortness of breath and wheezing.    Cardiovascular: Negative for chest pain, palpitations and leg swelling.   Gastrointestinal: Negative for abdominal distention, abdominal pain, anal bleeding, blood in stool, constipation, diarrhea, nausea, rectal pain and vomiting.   Endocrine: Negative for polydipsia and polyuria.   Genitourinary: Negative for decreased urine volume, difficulty urinating, dysuria, enuresis, flank pain, frequency, genital sores, hematuria, penile discharge, penile pain, penile swelling, scrotal swelling, testicular pain and urgency.   Musculoskeletal: Negative for arthralgias, back pain and myalgias.   Skin: Negative for color change, rash and wound.   Neurological: Negative for dizziness, syncope, speech difficulty, light-headedness and headaches.   Hematological: Negative for adenopathy. Does not bruise/bleed easily.   Psychiatric/Behavioral: Negative for behavioral problems, confusion, hallucinations and sleep disturbance.       Objective:      Physical Exam  Constitutional:       Appearance: He is well-developed.   HENT:      Head: Normocephalic.   Cardiovascular:      Rate and Rhythm: Normal rate.   Pulmonary:      Effort: Pulmonary effort is normal.   Abdominal:      Palpations: Abdomen is soft.   Skin:     General: Skin is warm.   Neurological:      Mental Status: He is alert.         Assessment:       1. Stricture of male urethra, unspecified stricture type    2. BPH with urinary obstruction        Plan:       Vinnie was seen today for follow-up.    Diagnoses and all orders for this visit:    Stricture of male urethra, unspecified stricture type  -     US Retroperitoneal Complete; Future    BPH with urinary obstruction  -      Prostate Specific Antigen, Diagnostic; Future      renal ultrasound and then switched to yearly.  He will continue doing CIC with a 16 Maltese catheter every other day

## 2022-02-24 ENCOUNTER — INFUSION (OUTPATIENT)
Dept: INFUSION THERAPY | Facility: HOSPITAL | Age: 72
End: 2022-02-24
Attending: INTERNAL MEDICINE
Payer: MEDICARE

## 2022-02-24 VITALS
DIASTOLIC BLOOD PRESSURE: 60 MMHG | TEMPERATURE: 99 F | OXYGEN SATURATION: 95 % | SYSTOLIC BLOOD PRESSURE: 100 MMHG | RESPIRATION RATE: 16 BRPM | HEART RATE: 107 BPM

## 2022-02-24 DIAGNOSIS — M85.859 OSTEOPENIA OF NECK OF FEMUR, UNSPECIFIED LATERALITY: Primary | ICD-10-CM

## 2022-02-24 PROCEDURE — 63600175 PHARM REV CODE 636 W HCPCS: Mod: HCNC | Performed by: INTERNAL MEDICINE

## 2022-02-24 PROCEDURE — 96365 THER/PROPH/DIAG IV INF INIT: CPT | Mod: HCNC

## 2022-02-24 RX ORDER — ZOLEDRONIC ACID 5 MG/100ML
5 INJECTION, SOLUTION INTRAVENOUS
Status: CANCELLED | OUTPATIENT
Start: 2022-02-24

## 2022-02-24 RX ORDER — SODIUM CHLORIDE 0.9 % (FLUSH) 0.9 %
10 SYRINGE (ML) INJECTION
Status: CANCELLED | OUTPATIENT
Start: 2022-02-24

## 2022-02-24 RX ORDER — ZOLEDRONIC ACID 5 MG/100ML
5 INJECTION, SOLUTION INTRAVENOUS
Status: COMPLETED | OUTPATIENT
Start: 2022-02-24 | End: 2022-02-24

## 2022-02-24 RX ORDER — HEPARIN 100 UNIT/ML
500 SYRINGE INTRAVENOUS
Status: CANCELLED | OUTPATIENT
Start: 2022-02-24

## 2022-02-24 RX ADMIN — ZOLEDRONIC ACID 5 MG: 0.05 INJECTION, SOLUTION INTRAVENOUS at 12:02

## 2022-02-24 NOTE — PLAN OF CARE
Pt arrived to unit for yearly Reclast infusion. Most recent calcium reviewed and within normal limits. Pt denies any recent falls. Reports he takes vitamin D at home. VSS. Reclast tolerated well. Receives appointments through [facility]. Left unit in NAD at time of discharge.

## 2022-03-23 DIAGNOSIS — I70.0 AORTIC ARCH ATHEROSCLEROSIS: ICD-10-CM

## 2022-03-23 DIAGNOSIS — I10 ESSENTIAL HYPERTENSION: ICD-10-CM

## 2022-03-23 DIAGNOSIS — F41.9 MILD ANXIETY: ICD-10-CM

## 2022-03-23 RX ORDER — CITALOPRAM 20 MG/1
20 TABLET, FILM COATED ORAL DAILY
Qty: 90 TABLET | Refills: 1 | Status: SHIPPED | OUTPATIENT
Start: 2022-03-23 | End: 2022-06-16 | Stop reason: SDUPTHER

## 2022-03-23 RX ORDER — AMLODIPINE BESYLATE 5 MG/1
TABLET ORAL
Qty: 90 TABLET | Refills: 1 | Status: SHIPPED | OUTPATIENT
Start: 2022-03-23 | End: 2022-06-16 | Stop reason: SDUPTHER

## 2022-03-23 RX ORDER — LOSARTAN POTASSIUM 100 MG/1
100 TABLET ORAL DAILY
Qty: 90 TABLET | Refills: 1 | Status: SHIPPED | OUTPATIENT
Start: 2022-03-23 | End: 2022-06-16 | Stop reason: SDUPTHER

## 2022-03-23 RX ORDER — ATORVASTATIN CALCIUM 10 MG/1
10 TABLET, FILM COATED ORAL DAILY
Qty: 90 TABLET | Refills: 1 | Status: SHIPPED | OUTPATIENT
Start: 2022-03-23 | End: 2022-06-16 | Stop reason: SDUPTHER

## 2022-03-23 NOTE — TELEPHONE ENCOUNTER
No new care gaps identified.  Powered by [employer]. Reference number: [id number].   3/23/2022 9:23:26 AM CDT

## 2022-06-16 ENCOUNTER — PES CALL (OUTPATIENT)
Dept: ADMINISTRATIVE | Facility: CLINIC | Age: 72
End: 2022-06-16
Payer: MEDICARE

## 2022-07-19 ENCOUNTER — PATIENT MESSAGE (OUTPATIENT)
Dept: RESEARCH | Facility: CLINIC | Age: 72
End: 2022-07-19
Payer: MEDICARE

## 2022-09-30 ENCOUNTER — PATIENT MESSAGE (OUTPATIENT)
Dept: FAMILY MEDICINE | Facility: CLINIC | Age: 72
End: 2022-09-30
Payer: MEDICARE

## 2022-11-16 ENCOUNTER — PES CALL (OUTPATIENT)
Dept: ADMINISTRATIVE | Facility: CLINIC | Age: 72
End: 2022-11-16
Payer: MEDICARE

## 2022-12-28 ENCOUNTER — PES CALL (OUTPATIENT)
Dept: ADMINISTRATIVE | Facility: CLINIC | Age: 72
End: 2022-12-28
Payer: MEDICARE

## 2023-01-24 ENCOUNTER — PATIENT MESSAGE (OUTPATIENT)
Dept: FAMILY MEDICINE | Facility: CLINIC | Age: 73
End: 2023-01-24
Payer: MEDICARE

## 2023-01-24 ENCOUNTER — HOSPITAL ENCOUNTER (OUTPATIENT)
Dept: RADIOLOGY | Facility: HOSPITAL | Age: 73
Discharge: HOME | End: 2023-01-24
Attending: UROLOGY
Payer: MEDICARE

## 2023-01-24 DIAGNOSIS — N35.919 STRICTURE OF MALE URETHRA, UNSPECIFIED STRICTURE TYPE: ICD-10-CM

## 2023-01-24 PROCEDURE — 76770 US EXAM ABDO BACK WALL COMP: CPT | Mod: 26,HCNC,, | Performed by: RADIOLOGY

## 2023-01-24 PROCEDURE — 76770 US EXAM ABDO BACK WALL COMP: CPT | Mod: TC,HCNC

## 2023-01-24 PROCEDURE — 76770 US RETROPERITONEAL COMPLETE: ICD-10-PCS | Mod: 26,HCNC,, | Performed by: RADIOLOGY

## 2023-01-25 DIAGNOSIS — I10 ESSENTIAL HYPERTENSION: ICD-10-CM

## 2023-01-26 RX ORDER — PANTOPRAZOLE SODIUM 40 MG/1
TABLET, DELAYED RELEASE ORAL
COMMUNITY
Start: 2022-11-09

## 2023-02-02 ENCOUNTER — TELEPHONE (OUTPATIENT)
Dept: FAMILY MEDICINE | Facility: CLINIC | Age: 73
End: 2023-02-02
Payer: MEDICARE

## 2023-02-02 NOTE — TELEPHONE ENCOUNTER
LVM for pt to call the clinic back about overdue flu vaccine. Left message via [employer] as well.
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
## 2023-02-03 ENCOUNTER — PATIENT OUTREACH (OUTPATIENT)
Dept: ADMINISTRATIVE | Facility: HOSPITAL | Age: 73
End: 2023-02-03
Payer: MEDICARE

## 2023-02-03 DIAGNOSIS — I10 ESSENTIAL HYPERTENSION: Primary | ICD-10-CM

## 2023-02-03 DIAGNOSIS — R73.03 PREDIABETES: ICD-10-CM

## 2023-02-03 NOTE — PROGRESS NOTES
[facility] 1 Panel Continuity 01.17.2023 - the patient already has an appointment scheduled on 04/26/23 w/ dr. Janine Acuña.

## 2023-02-09 DIAGNOSIS — Z00.00 ENCOUNTER FOR MEDICARE ANNUAL WELLNESS EXAM: ICD-10-CM

## 2023-02-16 ENCOUNTER — OFFICE VISIT (OUTPATIENT)
Dept: UROLOGY | Facility: CLINIC | Age: 73
End: 2023-02-16
Payer: MEDICARE

## 2023-02-16 VITALS
WEIGHT: 200.63 LBS | SYSTOLIC BLOOD PRESSURE: 88 MMHG | HEIGHT: 73 IN | HEART RATE: 105 BPM | BODY MASS INDEX: 26.59 KG/M2 | DIASTOLIC BLOOD PRESSURE: 57 MMHG

## 2023-02-16 DIAGNOSIS — N35.911 STRICTURE OF URETHRAL MEATUS IN MALE, UNSPECIFIED STRICTURE TYPE: Primary | ICD-10-CM

## 2023-02-16 PROCEDURE — 99213 OFFICE O/P EST LOW 20 MIN: CPT | Mod: HCNC,S$GLB,, | Performed by: UROLOGY

## 2023-02-16 PROCEDURE — 3074F SYST BP LT 130 MM HG: CPT | Mod: HCNC,CPTII,S$GLB, | Performed by: UROLOGY

## 2023-02-16 PROCEDURE — 1101F PT FALLS ASSESS-DOCD LE1/YR: CPT | Mod: HCNC,CPTII,S$GLB, | Performed by: UROLOGY

## 2023-02-16 PROCEDURE — 1159F MED LIST DOCD IN RCRD: CPT | Mod: HCNC,CPTII,S$GLB, | Performed by: UROLOGY

## 2023-02-16 PROCEDURE — 3288F FALL RISK ASSESSMENT DOCD: CPT | Mod: HCNC,CPTII,S$GLB, | Performed by: UROLOGY

## 2023-02-16 PROCEDURE — 3074F PR MOST RECENT SYSTOLIC BLOOD PRESSURE < 130 MM HG: ICD-10-PCS | Mod: HCNC,CPTII,S$GLB, | Performed by: UROLOGY

## 2023-02-16 PROCEDURE — 1159F PR MEDICATION LIST DOCUMENTED IN MEDICAL RECORD: ICD-10-PCS | Mod: HCNC,CPTII,S$GLB, | Performed by: UROLOGY

## 2023-02-16 PROCEDURE — 4010F PR ACE/ARB THEARPY RXD/TAKEN: ICD-10-PCS | Mod: HCNC,CPTII,S$GLB, | Performed by: UROLOGY

## 2023-02-16 PROCEDURE — 4010F ACE/ARB THERAPY RXD/TAKEN: CPT | Mod: HCNC,CPTII,S$GLB, | Performed by: UROLOGY

## 2023-02-16 PROCEDURE — 3078F DIAST BP <80 MM HG: CPT | Mod: HCNC,CPTII,S$GLB, | Performed by: UROLOGY

## 2023-02-16 PROCEDURE — 3078F PR MOST RECENT DIASTOLIC BLOOD PRESSURE < 80 MM HG: ICD-10-PCS | Mod: HCNC,CPTII,S$GLB, | Performed by: UROLOGY

## 2023-02-16 PROCEDURE — 99999 PR PBB SHADOW E&M-EST. PATIENT-LVL III: CPT | Mod: PBBFAC,HCNC,, | Performed by: UROLOGY

## 2023-02-16 PROCEDURE — 99999 PR PBB SHADOW E&M-EST. PATIENT-LVL III: ICD-10-PCS | Mod: PBBFAC,HCNC,, | Performed by: UROLOGY

## 2023-02-16 PROCEDURE — 1126F PR PAIN SEVERITY QUANTIFIED, NO PAIN PRESENT: ICD-10-PCS | Mod: HCNC,CPTII,S$GLB, | Performed by: UROLOGY

## 2023-02-16 PROCEDURE — 3288F PR FALLS RISK ASSESSMENT DOCUMENTED: ICD-10-PCS | Mod: HCNC,CPTII,S$GLB, | Performed by: UROLOGY

## 2023-02-16 PROCEDURE — 3008F PR BODY MASS INDEX (BMI) DOCUMENTED: ICD-10-PCS | Mod: HCNC,CPTII,S$GLB, | Performed by: UROLOGY

## 2023-02-16 PROCEDURE — 3008F BODY MASS INDEX DOCD: CPT | Mod: HCNC,CPTII,S$GLB, | Performed by: UROLOGY

## 2023-02-16 PROCEDURE — 1126F AMNT PAIN NOTED NONE PRSNT: CPT | Mod: HCNC,CPTII,S$GLB, | Performed by: UROLOGY

## 2023-02-16 PROCEDURE — 99213 PR OFFICE/OUTPT VISIT, EST, LEVL III, 20-29 MIN: ICD-10-PCS | Mod: HCNC,S$GLB,, | Performed by: UROLOGY

## 2023-02-16 PROCEDURE — 1101F PR PT FALLS ASSESS DOC 0-1 FALLS W/OUT INJ PAST YR: ICD-10-PCS | Mod: HCNC,CPTII,S$GLB, | Performed by: UROLOGY

## 2023-02-16 NOTE — PROGRESS NOTES
Subjective:       Patient ID: Vinnie Scott is a 72 y.o. male.    Chief Complaint: No chief complaint on file.    HPI patient  has meatal stenosis and is doing well.  He catheterizes once or twice a week and lately he has been using the blue meatal dilator and that has kept him open quite nicely.    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    Mild anxiety     Overweight(278.02)     Personal history of colonic polyps October 2010, May 2015    Prediabetes     Urinary anastomotic stricture        Past Surgical History:   Procedure Laterality Date    ADENOIDECTOMY      COLONOSCOPY N/A 5/31/2017    Procedure: COLONOSCOPY;  Surgeon: Fatoumata Cook MD;  Location: [facility];  Service: Endoscopy;  Laterality: N/A;    CYSTOGRAM N/A 10/15/2021    Procedure: CYSTOGRAM;  Surgeon: Eligio Culp Jr., MD;  Location: [address];  Service: Urology;  Laterality: N/A;    CYSTOSCOPY N/A 10/15/2021    Procedure: CYSTOSCOPY;  Surgeon: Eligio Culp Jr., MD;  Location: [address];  Service: Urology;  Laterality: N/A;    DILATION OF URETHRA N/A 10/15/2021    Procedure: DILATION, URETHRA;  Surgeon: Eligio Culp Jr., MD;  Location: [address];  Service: Urology;  Laterality: N/A;    FRACTURE SURGERY      Left collarbone    left ankle fracture repair Left 12/2018    TONSILLECTOMY      urinary stricture clearing      VASECTOMY         Family History   Problem Relation Age of Onset    Lung cancer Father     Diabetes Father     Uterine cancer Mother     Heart disease Mother     Drug abuse Brother     Alcohol abuse Brother     Prostate cancer Neg Hx     Colon cancer Neg Hx        Social  History     Socioeconomic History    Marital status:     Number of children: 2   Occupational History    Occupation: Property Owl at [employer]     Employer: [employer]   Tobacco Use    Smoking status: Former     Packs/day: 1.00     Years: 45.00     Pack years: 45.00     Types: Cigarettes     Quit date: 2011     Years since quittin.4    Smokeless tobacco: Never   Substance and Sexual Activity    Alcohol use: Yes     Comment: 4-5 beers daily    Drug use: No       Allergies:  Ciprofloxacin and Lisinopril    Medications:    Current Outpatient Medications:     albuterol (PROVENTIL/VENTOLIN HFA) 90 mcg/actuation inhaler, Inhale 2 puffs into the lungs every 6 (six) hours as needed for Wheezing or Shortness of Breath. Rescue, Disp: 18 g, Rfl: 0    amLODIPine (NORVASC) 5 MG tablet, TAKE 1 TABLET(5 MG) BY MOUTH EVERY DAY, Disp: 90 tablet, Rfl: 0    atorvastatin (LIPITOR) 10 MG tablet, Take 1 tablet (10 mg total) by mouth once daily., Disp: 90 tablet, Rfl: 0    citalopram (CELEXA) 20 MG tablet, Take 1 tablet (20 mg total) by mouth once daily., Disp: 90 tablet, Rfl: 0    fexofenadine (ALLEGRA) 180 MG tablet, Take 1 tablet (180 mg total) by mouth once daily. (Patient taking differently: Take 180 mg by mouth nightly.), Disp: 90 tablet, Rfl: 0    fluticasone propionate (FLONASE) 50 mcg/actuation nasal spray, 1 spray (50 mcg total) by Each Nostril route once daily., Disp: 48 g, Rfl: 0    losartan (COZAAR) 100 MG tablet, Take 1 tablet (100 mg total) by mouth once daily., Disp: 90 tablet, Rfl: 0    nystatin-triamcinolone (MYCOLOG II) cream, Apply topically 4 (four) times daily., Disp: 30 g, Rfl: 4    omeprazole (PRILOSEC OTC) 20 MG tablet, Take 20 mg by mouth daily as needed., Disp: , Rfl:     pantoprazole (PROTONIX) 40 MG tablet, , Disp: , Rfl:     oxybutynin (DITROPAN) 5 MG Tab, Take 1 tablet (5 mg total) by mouth 3 (three) times daily as needed (bladder spasms)., Disp: 30 tablet, Rfl:  0    Review of Systems   Constitutional:  Negative for activity change, appetite change, chills, diaphoresis, fatigue, fever and unexpected weight change.   HENT:  Negative for congestion, dental problem, hearing loss, mouth sores, postnasal drip, rhinorrhea, sinus pressure and trouble swallowing.    Eyes:  Negative for pain, discharge and itching.   Respiratory:  Negative for apnea, cough, choking, chest tightness, shortness of breath and wheezing.    Cardiovascular:  Negative for chest pain, palpitations and leg swelling.   Gastrointestinal:  Negative for abdominal distention, abdominal pain, anal bleeding, blood in stool, constipation, diarrhea, nausea, rectal pain and vomiting.   Endocrine: Negative for polydipsia and polyuria.   Genitourinary:  Negative for decreased urine volume, difficulty urinating, dysuria, enuresis, flank pain, frequency, genital sores, hematuria, penile discharge, penile pain, penile swelling, scrotal swelling, testicular pain and urgency.   Musculoskeletal:  Negative for arthralgias, back pain and myalgias.   Skin:  Negative for color change, rash and wound.   Neurological:  Negative for dizziness, syncope, speech difficulty, light-headedness and headaches.   Hematological:  Negative for adenopathy. Does not bruise/bleed easily.   Psychiatric/Behavioral:  Negative for behavioral problems, confusion, hallucinations and sleep disturbance.      Objective:      Physical Exam  Constitutional:       Appearance: He is well-developed.   HENT:      Head: Normocephalic.   Cardiovascular:      Rate and Rhythm: Normal rate.   Pulmonary:      Effort: Pulmonary effort is normal.   Abdominal:      Palpations: Abdomen is soft.   Genitourinary:     Prostate: Normal.      Comments: Prostate is small and flat without nodule  Skin:     General: Skin is warm.   Neurological:      Mental Status: He is alert.       Assessment:       1. Stricture of urethral meatus in male, unspecified stricture type           Plan:       Diagnoses and all orders for this visit:    Stricture of urethral meatus in male, unspecified stricture type        Return to clinic 1 year and continue dilatation

## 2023-03-17 ENCOUNTER — PATIENT MESSAGE (OUTPATIENT)
Dept: INFECTIOUS DISEASES | Facility: CLINIC | Age: 73
End: 2023-03-17
Payer: MEDICARE

## 2023-03-20 ENCOUNTER — PES CALL (OUTPATIENT)
Dept: ADMINISTRATIVE | Facility: CLINIC | Age: 73
End: 2023-03-20
Payer: MEDICARE

## 2023-03-20 ENCOUNTER — TELEPHONE (OUTPATIENT)
Dept: FAMILY MEDICINE | Facility: CLINIC | Age: 73
End: 2023-03-20

## 2023-04-20 ENCOUNTER — TELEPHONE (OUTPATIENT)
Dept: FAMILY MEDICINE | Facility: CLINIC | Age: 73
End: 2023-04-20
Payer: MEDICARE

## 2023-04-20 DIAGNOSIS — Z12.5 PROSTATE CANCER SCREENING: ICD-10-CM

## 2023-04-20 DIAGNOSIS — I10 ESSENTIAL HYPERTENSION: Primary | ICD-10-CM

## 2023-04-20 DIAGNOSIS — R73.03 PREDIABETES: ICD-10-CM

## 2023-04-21 ENCOUNTER — LAB VISIT (OUTPATIENT)
Dept: LAB | Facility: HOSPITAL | Age: 73
End: 2023-04-21
Attending: INTERNAL MEDICINE
Payer: MEDICARE

## 2023-04-21 DIAGNOSIS — I10 ESSENTIAL HYPERTENSION: ICD-10-CM

## 2023-04-21 DIAGNOSIS — Z12.5 PROSTATE CANCER SCREENING: ICD-10-CM

## 2023-04-21 DIAGNOSIS — R73.03 PREDIABETES: ICD-10-CM

## 2023-04-21 LAB
ALBUMIN SERPL BCP-MCNC: 4 G/DL (ref 3.5–5.2)
ALP SERPL-CCNC: 79 U/L (ref 55–135)
ALT SERPL W/O P-5'-P-CCNC: 20 U/L (ref 10–44)
ANION GAP SERPL CALC-SCNC: 14 MMOL/L (ref 8–16)
AST SERPL-CCNC: 23 U/L (ref 10–40)
BASOPHILS # BLD AUTO: 0.05 K/UL (ref 0–0.2)
BASOPHILS NFR BLD: 0.6 % (ref 0–1.9)
BILIRUB SERPL-MCNC: 0.6 MG/DL (ref 0.1–1)
BUN SERPL-MCNC: 24 MG/DL (ref 8–23)
CALCIUM SERPL-MCNC: 9.1 MG/DL (ref 8.7–10.5)
CHLORIDE SERPL-SCNC: 103 MMOL/L (ref 95–110)
CHOLEST SERPL-MCNC: 151 MG/DL (ref 120–199)
CHOLEST/HDLC SERPL: 2.4 {RATIO} (ref 2–5)
CO2 SERPL-SCNC: 22 MMOL/L (ref 23–29)
COMPLEXED PSA SERPL-MCNC: 4 NG/ML (ref 0–4)
CREAT SERPL-MCNC: 1.4 MG/DL (ref 0.5–1.4)
DIFFERENTIAL METHOD: ABNORMAL
EOSINOPHIL # BLD AUTO: 0 K/UL (ref 0–0.5)
EOSINOPHIL NFR BLD: 0.4 % (ref 0–8)
ERYTHROCYTE [DISTWIDTH] IN BLOOD BY AUTOMATED COUNT: 19 % (ref 11.5–14.5)
EST. GFR  (NO RACE VARIABLE): 54.1 ML/MIN/1.73 M^2
ESTIMATED AVG GLUCOSE: 114 MG/DL (ref 68–131)
GLUCOSE SERPL-MCNC: 106 MG/DL (ref 70–110)
HBA1C MFR BLD: 5.6 % (ref 4–5.6)
HCT VFR BLD AUTO: 35 % (ref 40–54)
HDLC SERPL-MCNC: 62 MG/DL (ref 40–75)
HDLC SERPL: 41.1 % (ref 20–50)
HGB BLD-MCNC: 10.3 G/DL (ref 14–18)
IMM GRANULOCYTES # BLD AUTO: 0.02 K/UL (ref 0–0.04)
IMM GRANULOCYTES NFR BLD AUTO: 0.2 % (ref 0–0.5)
LDLC SERPL CALC-MCNC: 78.8 MG/DL (ref 63–159)
LYMPHOCYTES # BLD AUTO: 1.2 K/UL (ref 1–4.8)
LYMPHOCYTES NFR BLD: 13.5 % (ref 18–48)
MCH RBC QN AUTO: 24.3 PG (ref 27–31)
MCHC RBC AUTO-ENTMCNC: 29.4 G/DL (ref 32–36)
MCV RBC AUTO: 83 FL (ref 82–98)
MONOCYTES # BLD AUTO: 0.8 K/UL (ref 0.3–1)
MONOCYTES NFR BLD: 9.1 % (ref 4–15)
NEUTROPHILS # BLD AUTO: 6.5 K/UL (ref 1.8–7.7)
NEUTROPHILS NFR BLD: 76.2 % (ref 38–73)
NONHDLC SERPL-MCNC: 89 MG/DL
NRBC BLD-RTO: 0 /100 WBC
PLATELET # BLD AUTO: 353 K/UL (ref 150–450)
PMV BLD AUTO: 8.2 FL (ref 9.2–12.9)
POTASSIUM SERPL-SCNC: 4.6 MMOL/L (ref 3.5–5.1)
PROT SERPL-MCNC: 7.8 G/DL (ref 6–8.4)
RBC # BLD AUTO: 4.24 M/UL (ref 4.6–6.2)
SODIUM SERPL-SCNC: 139 MMOL/L (ref 136–145)
TRIGL SERPL-MCNC: 51 MG/DL (ref 30–150)
WBC # BLD AUTO: 8.57 K/UL (ref 3.9–12.7)

## 2023-04-21 PROCEDURE — 80053 COMPREHEN METABOLIC PANEL: CPT | Mod: HCNC | Performed by: INTERNAL MEDICINE

## 2023-04-21 PROCEDURE — 80061 LIPID PANEL: CPT | Mod: HCNC | Performed by: INTERNAL MEDICINE

## 2023-04-21 PROCEDURE — 84153 ASSAY OF PSA TOTAL: CPT | Mod: HCNC | Performed by: INTERNAL MEDICINE

## 2023-04-21 PROCEDURE — 83036 HEMOGLOBIN GLYCOSYLATED A1C: CPT | Mod: HCNC | Performed by: INTERNAL MEDICINE

## 2023-04-21 PROCEDURE — 36415 COLL VENOUS BLD VENIPUNCTURE: CPT | Mod: HCNC,PO | Performed by: INTERNAL MEDICINE

## 2023-04-21 PROCEDURE — 85025 COMPLETE CBC W/AUTO DIFF WBC: CPT | Mod: HCNC | Performed by: INTERNAL MEDICINE

## 2023-04-26 ENCOUNTER — PATIENT MESSAGE (OUTPATIENT)
Dept: ADMINISTRATIVE | Facility: OTHER | Age: 73
End: 2023-04-26
Payer: MEDICARE

## 2023-04-26 ENCOUNTER — OFFICE VISIT (OUTPATIENT)
Dept: FAMILY MEDICINE | Facility: CLINIC | Age: 73
End: 2023-04-26
Payer: MEDICARE

## 2023-04-26 ENCOUNTER — PATIENT MESSAGE (OUTPATIENT)
Dept: FAMILY MEDICINE | Facility: CLINIC | Age: 73
End: 2023-04-26

## 2023-04-26 VITALS
TEMPERATURE: 98 F | HEART RATE: 95 BPM | HEIGHT: 73 IN | SYSTOLIC BLOOD PRESSURE: 112 MMHG | BODY MASS INDEX: 27.29 KG/M2 | OXYGEN SATURATION: 97 % | WEIGHT: 205.94 LBS | DIASTOLIC BLOOD PRESSURE: 68 MMHG

## 2023-04-26 DIAGNOSIS — D50.9 IRON DEFICIENCY ANEMIA, UNSPECIFIED IRON DEFICIENCY ANEMIA TYPE: ICD-10-CM

## 2023-04-26 DIAGNOSIS — F41.9 MILD ANXIETY: ICD-10-CM

## 2023-04-26 DIAGNOSIS — R09.81 CHRONIC NASAL CONGESTION: ICD-10-CM

## 2023-04-26 DIAGNOSIS — R73.03 PREDIABETES: ICD-10-CM

## 2023-04-26 DIAGNOSIS — J30.89 NON-SEASONAL ALLERGIC RHINITIS, UNSPECIFIED TRIGGER: ICD-10-CM

## 2023-04-26 DIAGNOSIS — M85.859 OSTEOPENIA OF NECK OF FEMUR, UNSPECIFIED LATERALITY: ICD-10-CM

## 2023-04-26 DIAGNOSIS — E55.9 VITAMIN D DEFICIENCY: ICD-10-CM

## 2023-04-26 DIAGNOSIS — Z00.00 ROUTINE MEDICAL EXAM: Primary | ICD-10-CM

## 2023-04-26 DIAGNOSIS — R91.8 PULMONARY NODULES/LESIONS, MULTIPLE: ICD-10-CM

## 2023-04-26 DIAGNOSIS — R05.3 CHRONIC COUGH: ICD-10-CM

## 2023-04-26 DIAGNOSIS — I73.9 ASYMPTOMATIC PVD (PERIPHERAL VASCULAR DISEASE): ICD-10-CM

## 2023-04-26 DIAGNOSIS — I10 ESSENTIAL HYPERTENSION: ICD-10-CM

## 2023-04-26 DIAGNOSIS — E66.3 OVERWEIGHT (BMI 25.0-29.9): ICD-10-CM

## 2023-04-26 DIAGNOSIS — Z87.891 PERSONAL HISTORY OF TOBACCO USE: ICD-10-CM

## 2023-04-26 DIAGNOSIS — J98.6 ELEVATED HEMIDIAPHRAGM: ICD-10-CM

## 2023-04-26 DIAGNOSIS — K21.9 GASTROESOPHAGEAL REFLUX DISEASE WITHOUT ESOPHAGITIS: ICD-10-CM

## 2023-04-26 PROCEDURE — 3074F SYST BP LT 130 MM HG: CPT | Mod: HCNC,CPTII,S$GLB, | Performed by: INTERNAL MEDICINE

## 2023-04-26 PROCEDURE — 3008F PR BODY MASS INDEX (BMI) DOCUMENTED: ICD-10-PCS | Mod: HCNC,CPTII,S$GLB, | Performed by: INTERNAL MEDICINE

## 2023-04-26 PROCEDURE — 3288F FALL RISK ASSESSMENT DOCD: CPT | Mod: HCNC,CPTII,S$GLB, | Performed by: INTERNAL MEDICINE

## 2023-04-26 PROCEDURE — 1101F PR PT FALLS ASSESS DOC 0-1 FALLS W/OUT INJ PAST YR: ICD-10-PCS | Mod: HCNC,CPTII,S$GLB, | Performed by: INTERNAL MEDICINE

## 2023-04-26 PROCEDURE — 1159F PR MEDICATION LIST DOCUMENTED IN MEDICAL RECORD: ICD-10-PCS | Mod: HCNC,CPTII,S$GLB, | Performed by: INTERNAL MEDICINE

## 2023-04-26 PROCEDURE — 3008F BODY MASS INDEX DOCD: CPT | Mod: HCNC,CPTII,S$GLB, | Performed by: INTERNAL MEDICINE

## 2023-04-26 PROCEDURE — 3074F PR MOST RECENT SYSTOLIC BLOOD PRESSURE < 130 MM HG: ICD-10-PCS | Mod: HCNC,CPTII,S$GLB, | Performed by: INTERNAL MEDICINE

## 2023-04-26 PROCEDURE — 3078F PR MOST RECENT DIASTOLIC BLOOD PRESSURE < 80 MM HG: ICD-10-PCS | Mod: HCNC,CPTII,S$GLB, | Performed by: INTERNAL MEDICINE

## 2023-04-26 PROCEDURE — 99397 PR PREVENTIVE VISIT,EST,65 & OVER: ICD-10-PCS | Mod: HCNC,S$GLB,, | Performed by: INTERNAL MEDICINE

## 2023-04-26 PROCEDURE — 99397 PER PM REEVAL EST PAT 65+ YR: CPT | Mod: HCNC,S$GLB,, | Performed by: INTERNAL MEDICINE

## 2023-04-26 PROCEDURE — 3078F DIAST BP <80 MM HG: CPT | Mod: HCNC,CPTII,S$GLB, | Performed by: INTERNAL MEDICINE

## 2023-04-26 PROCEDURE — 3288F PR FALLS RISK ASSESSMENT DOCUMENTED: ICD-10-PCS | Mod: HCNC,CPTII,S$GLB, | Performed by: INTERNAL MEDICINE

## 2023-04-26 PROCEDURE — 99999 PR PBB SHADOW E&M-EST. PATIENT-LVL IV: ICD-10-PCS | Mod: PBBFAC,HCNC,, | Performed by: INTERNAL MEDICINE

## 2023-04-26 PROCEDURE — 1160F RVW MEDS BY RX/DR IN RCRD: CPT | Mod: HCNC,CPTII,S$GLB, | Performed by: INTERNAL MEDICINE

## 2023-04-26 PROCEDURE — 3044F PR MOST RECENT HEMOGLOBIN A1C LEVEL <7.0%: ICD-10-PCS | Mod: HCNC,CPTII,S$GLB, | Performed by: INTERNAL MEDICINE

## 2023-04-26 PROCEDURE — 99499 UNLISTED E&M SERVICE: CPT | Mod: HCNC,S$GLB,, | Performed by: INTERNAL MEDICINE

## 2023-04-26 PROCEDURE — 1160F PR REVIEW ALL MEDS BY PRESCRIBER/CLIN PHARMACIST DOCUMENTED: ICD-10-PCS | Mod: HCNC,CPTII,S$GLB, | Performed by: INTERNAL MEDICINE

## 2023-04-26 PROCEDURE — 1125F PR PAIN SEVERITY QUANTIFIED, PAIN PRESENT: ICD-10-PCS | Mod: HCNC,CPTII,S$GLB, | Performed by: INTERNAL MEDICINE

## 2023-04-26 PROCEDURE — 1159F MED LIST DOCD IN RCRD: CPT | Mod: HCNC,CPTII,S$GLB, | Performed by: INTERNAL MEDICINE

## 2023-04-26 PROCEDURE — 4010F ACE/ARB THERAPY RXD/TAKEN: CPT | Mod: HCNC,CPTII,S$GLB, | Performed by: INTERNAL MEDICINE

## 2023-04-26 PROCEDURE — 3044F HG A1C LEVEL LT 7.0%: CPT | Mod: HCNC,CPTII,S$GLB, | Performed by: INTERNAL MEDICINE

## 2023-04-26 PROCEDURE — 4010F PR ACE/ARB THEARPY RXD/TAKEN: ICD-10-PCS | Mod: HCNC,CPTII,S$GLB, | Performed by: INTERNAL MEDICINE

## 2023-04-26 PROCEDURE — 99999 PR PBB SHADOW E&M-EST. PATIENT-LVL IV: CPT | Mod: PBBFAC,HCNC,, | Performed by: INTERNAL MEDICINE

## 2023-04-26 PROCEDURE — 1125F AMNT PAIN NOTED PAIN PRSNT: CPT | Mod: HCNC,CPTII,S$GLB, | Performed by: INTERNAL MEDICINE

## 2023-04-26 PROCEDURE — 1101F PT FALLS ASSESS-DOCD LE1/YR: CPT | Mod: HCNC,CPTII,S$GLB, | Performed by: INTERNAL MEDICINE

## 2023-04-26 PROCEDURE — 99499 RISK ADDL DX/OHS AUDIT: ICD-10-PCS | Mod: HCNC,S$GLB,, | Performed by: INTERNAL MEDICINE

## 2023-04-26 RX ORDER — ASPIRIN 81 MG/1
81 TABLET ORAL DAILY
Qty: 90 TABLET | Refills: 3 | Status: SHIPPED | OUTPATIENT
Start: 2023-04-26 | End: 2023-11-20 | Stop reason: SDUPTHER

## 2023-04-26 RX ORDER — HEPARIN 100 UNIT/ML
500 SYRINGE INTRAVENOUS
Status: CANCELLED | OUTPATIENT
Start: 2023-04-26

## 2023-04-26 RX ORDER — SODIUM CHLORIDE 0.9 % (FLUSH) 0.9 %
10 SYRINGE (ML) INJECTION
Status: CANCELLED | OUTPATIENT
Start: 2023-04-26

## 2023-04-26 RX ORDER — ZOLEDRONIC ACID 5 MG/100ML
5 INJECTION, SOLUTION INTRAVENOUS
Status: CANCELLED | OUTPATIENT
Start: 2023-04-26

## 2023-04-26 NOTE — PROGRESS NOTES
CHIEF COMPLAINT:   Chief Complaint   Patient presents with    Annual Exam         HISTORY OF PRESENT ILLNESS:  Vinnie Scott is a 72 y.o. male who presents to the clinic today for a routine medical physical exam. His last physical exam was approximately 1 years(s) ago.    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    Mild anxiety     Overweight(278.02)     Personal history of colonic polyps October 2010, May 2015    Prediabetes     Urinary anastomotic stricture        PAST SURGICAL HISTORY:  Past Surgical History:   Procedure Laterality Date    ADENOIDECTOMY      COLONOSCOPY N/A 5/31/2017    Procedure: COLONOSCOPY;  Surgeon: Fatoumata Cook MD;  Location: [facility];  Service: Endoscopy;  Laterality: N/A;    CYSTOGRAM N/A 10/15/2021    Procedure: CYSTOGRAM;  Surgeon: Eligio Culp Jr., MD;  Location: [address];  Service: Urology;  Laterality: N/A;    CYSTOSCOPY N/A 10/15/2021    Procedure: CYSTOSCOPY;  Surgeon: Eligio Culp Jr., MD;  Location: [address];  Service: Urology;  Laterality: N/A;    DILATION OF URETHRA N/A 10/15/2021    Procedure: DILATION, URETHRA;  Surgeon: Eligio Culp Jr., MD;  Location: [address];  Service: Urology;  Laterality: N/A;    FRACTURE SURGERY      Left collarbone    left ankle fracture repair Left 12/2018    TONSILLECTOMY      urinary stricture clearing      VASECTOMY         SOCIAL HISTORY:  Social History     Socioeconomic History    Marital status:     Number of children: 2   Occupational History    Occupation: [employer] at [employer]     Employer: [email or web address]    Tobacco Use    Smoking status: Former     Packs/day: 1.00     Years: 45.00     Pack years: 45.00     Types: Cigarettes     Quit date: 2011     Years since quittin.6    Smokeless tobacco: Never   Substance and Sexual Activity    Alcohol use: Yes     Comment: 4-5 beers daily    Drug use: No       FAMILY HISTORY:  Family History   Problem Relation Age of Onset    Lung cancer Father     Diabetes Father     Uterine cancer Mother     Heart disease Mother     Drug abuse Brother     Alcohol abuse Brother     Prostate cancer Neg Hx     Colon cancer Neg Hx        ALLERGIES AND MEDICATIONS: updated and reviewed.  Review of patient's allergies indicates:   Allergen Reactions    Ciprofloxacin      Other reaction(s): Muscle pain    Lisinopril Other (See Comments)     cough     Medication List with Changes/Refills   New Medications    ASPIRIN (ECOTRIN) 81 MG EC TABLET    Take 1 tablet (81 mg total) by mouth once daily.   Current Medications    ALBUTEROL (PROVENTIL/VENTOLIN HFA) 90 MCG/ACTUATION INHALER    Inhale 2 puffs into the lungs every 6 (six) hours as needed for Wheezing or Shortness of Breath. Rescue    AMLODIPINE (NORVASC) 5 MG TABLET    TAKE 1 TABLET(5 MG) BY MOUTH EVERY DAY    ATORVASTATIN (LIPITOR) 10 MG TABLET    Take 1 tablet (10 mg total) by mouth once daily.    CITALOPRAM (CELEXA) 20 MG TABLET    Take 1 tablet (20 mg total) by mouth once daily.    FEXOFENADINE (ALLEGRA) 180 MG TABLET    Take 1 tablet (180 mg total) by mouth once daily.    FLUTICASONE PROPIONATE (FLONASE) 50 MCG/ACTUATION NASAL SPRAY    1 spray (50 mcg total) by Each Nostril route once daily.    LOSARTAN (COZAAR) 100 MG TABLET    Take 1 tablet (100 mg total) by mouth once daily.    NYSTATIN-TRIAMCINOLONE (MYCOLOG II) CREAM    Apply topically 4 (four) times daily.    OMEPRAZOLE (PRILOSEC OTC) 20 MG TABLET    Take 20 mg by mouth daily as needed.    OXYBUTYNIN (DITROPAN) 5 MG TAB    Take 1 tablet (5 mg total) by mouth 3 (three) times daily  as needed (bladder spasms).    PANTOPRAZOLE (PROTONIX) 40 MG TABLET             CARE TEAM:  Patient Care Team:  Janine Acuña MD as PCP - General (Internal Medicine)  Eligio Culp Jr., MD as Consulting Physician (Urology)  Carmelita Moreno LPN as Care Coordinator  Tacho Carroll MD as Consulting Physician (Gastroenterology)              SCREENING HISTORY:  Health Maintenance         Date Due Completion Date    COVID-19 Vaccine (4 - Booster for Moderna series) 04/06/2022 2/9/2022    LDCT Lung Screen 12/17/2022 12/17/2021    Colorectal Cancer Screening 08/20/2023 8/20/2020    DEXA Scan 01/31/2024 1/31/2022    PROSTATE-SPECIFIC ANTIGEN 04/21/2024 4/21/2023    Lipid Panel 04/21/2024 4/21/2023    Hemoglobin A1c 04/21/2024 4/21/2023    High Dose Statin 04/26/2024 4/26/2023    TETANUS VACCINE 04/13/2025 4/13/2015              REVIEW OF SYSTEMS:   The patient reports: they are improving their dietary habits  - he is improving portion sizes and eating less carbs . He has also significantly cut down on his alcohol intake.  The patient reports  : that they do not exercise regularly  Review of Systems   Constitutional:  Negative for activity change and unexpected weight change.   HENT:  Positive for congestion (chronic). Negative for hearing loss, rhinorrhea and trouble swallowing.    Eyes:  Negative for discharge and visual disturbance.   Respiratory:  Positive for cough (chronic - has appointment to see pulmonology in June; also occurs after eating - has seen GI). Negative for chest tightness and wheezing.    Cardiovascular:  Negative for chest pain and palpitations.   Gastrointestinal:  Negative for blood in stool, constipation, diarrhea and vomiting.   Endocrine: Negative for polydipsia and polyuria.   Genitourinary:  Negative for difficulty urinating, hematuria and urgency.   Musculoskeletal:  Positive for arthralgias and joint swelling. Negative for neck pain.   Neurological:  Positive for dizziness (has  "intermittent feelings of vertigo). Negative for weakness and headaches.   Psychiatric/Behavioral:  Negative for confusion and dysphoric mood.              PHYSICAL EXAM:  274}  Vitals:    04/26/23 1352   BP: 112/68   BP Location: Left arm   Patient Position: Sitting   BP Method: Medium (Manual)   Pulse: 95   Temp: 97.7 °F (36.5 °C)   TempSrc: Oral   SpO2: 97%   Weight: 93.4 kg (205 lb 14.6 oz)   Height: 6' 1" (1.854 m)       Body mass index is 27.17 kg/m².    General appearance - alert, well appearing, and in no distress, overweight  Psychiatric - alert, oriented to person, place, and time, normal behavior, speech, dress, motor activity and thought process  Eyes - pupils equal and reactive, extraocular eye movements intact, sclera anicteric  Mouth - not examined  Neck - supple, no significant adenopathy, carotids upstroke normal bilaterally, no bruits  Lymphatics - no palpable cervical lymphadenopathy  Chest - clear to auscultation, no wheezes, rales or rhonchi, symmetric air entry  Heart - normal rate and regular rhythm, no gallops noted  Neurological - alert, normal speech, no focal findings; cranial nerves II through XII intact  Musculoskeletal - patient noted to have Mild-Moderate osteoarthritic changes to both knee joints. No joint effusions noted.  Extremities - no pedal edema noted  Skin - normal coloration, no suspicious skin lesions      Labs:  Results for orders placed or performed in visit on 04/21/23   CBC Auto Differential   Result Value Ref Range    WBC 8.57 3.90 - 12.70 K/uL    RBC 4.24 (L) 4.60 - 6.20 M/uL    Hemoglobin 10.3 (L) 14.0 - 18.0 g/dL    Hematocrit 35.0 (L) 40.0 - 54.0 %    MCV 83 82 - 98 fL    MCH 24.3 (L) 27.0 - 31.0 pg    MCHC 29.4 (L) 32.0 - 36.0 g/dL    RDW 19.0 (H) 11.5 - 14.5 %    Platelets 353 150 - 450 K/uL    MPV 8.2 (L) 9.2 - 12.9 fL    Immature Granulocytes 0.2 0.0 - 0.5 %    Gran # (ANC) 6.5 1.8 - 7.7 K/uL    Immature Grans (Abs) 0.02 0.00 - 0.04 K/uL    Lymph # 1.2 1.0 - 4.8 " K/uL    Mono # 0.8 0.3 - 1.0 K/uL    Eos # 0.0 0.0 - 0.5 K/uL    Baso # 0.05 0.00 - 0.20 K/uL    nRBC 0 0 /100 WBC    Gran % 76.2 (H) 38.0 - 73.0 %    Lymph % 13.5 (L) 18.0 - 48.0 %    Mono % 9.1 4.0 - 15.0 %    Eosinophil % 0.4 0.0 - 8.0 %    Basophil % 0.6 0.0 - 1.9 %    Differential Method Automated    Comprehensive Metabolic Panel   Result Value Ref Range    Sodium 139 136 - 145 mmol/L    Potassium 4.6 3.5 - 5.1 mmol/L    Chloride 103 95 - 110 mmol/L    CO2 22 (L) 23 - 29 mmol/L    Glucose 106 70 - 110 mg/dL    BUN 24 (H) 8 - 23 mg/dL    Creatinine 1.4 0.5 - 1.4 mg/dL    Calcium 9.1 8.7 - 10.5 mg/dL    Total Protein 7.8 6.0 - 8.4 g/dL    Albumin 4.0 3.5 - 5.2 g/dL    Total Bilirubin 0.6 0.1 - 1.0 mg/dL    Alkaline Phosphatase 79 55 - 135 U/L    AST 23 10 - 40 U/L    ALT 20 10 - 44 U/L    Anion Gap 14 8 - 16 mmol/L    eGFR 54.1 (A) >60 mL/min/1.73 m^2   Lipid Panel   Result Value Ref Range    Cholesterol 151 120 - 199 mg/dL    Triglycerides 51 30 - 150 mg/dL    HDL 62 40 - 75 mg/dL    LDL Cholesterol 78.8 63.0 - 159.0 mg/dL    HDL/Cholesterol Ratio 41.1 20.0 - 50.0 %    Total Cholesterol/HDL Ratio 2.4 2.0 - 5.0    Non-HDL Cholesterol 89 mg/dL   Hemoglobin A1C   Result Value Ref Range    Hemoglobin A1C 5.6 4.0 - 5.6 %    Estimated Avg Glucose 114 68 - 131 mg/dL   PSA, Screening   Result Value Ref Range    PSA, Screen 4.0 0.00 - 4.00 ng/mL            ASSESSMENT AND PLAN: 274}  1. Routine medical exam  Counseled on age appropriate medical preventative services including age appropriate cancer screenings, age appropriate eye and dental exams, over all nutritional health, need for a consistent exercise regimen, and an over all push towards maintaining a vigorous and active lifestyle.  Counseled on age appropriate vaccines and discussed upcoming health care needs based on age/gender. Discussed good sleep hygiene and stress management.    2. Essential hypertension  BP Readings from Last 1 Encounters:   04/26/23 112/68       He reports that his blood pressures recently have been running low.  He is currently not taking his amlodipine.  He will continue to hold the amlodipine for now.  He will enroll in the digital hypertension program.  -     Hypertension Digital Medicine ([facility]) Enrollment Order  -     Hypertension Digital Medicine ([facility]): Assign Onboarding Questionnaires    3. Asymptomatic PVD (peripheral vascular disease)  Will continue on his statin medication.  I asked him to start low-dose aspirin.  Observe.  Overview:  - extensive calcifications noted in peripheral vessels on ankle X-ray 12/2018    Orders:  -     aspirin (ECOTRIN) 81 MG EC tablet; Take 1 tablet (81 mg total) by mouth once daily.  Dispense: 90 tablet; Refill: 3    4. Pulmonary nodules/lesions, multiple  Is due for his lung cancer screening which we will schedule at this time.  Overview:  CT chest 7/14/2020: stable nodules compare to 1/14/2020 likely benign, recommended annual LDCT:      5. Chronic cough  Previously has chronic cough was felt to be multifactorial.  We can discussed reflux precautions.  He should not eat shortly before going to bed which she is currently doing.  He will continue on PPI per GI.  He has had EGD.  I will check a CT scan of his chest and he has an appointment with pulmonology in June.  I will also check a CT scan of his sinuses since he complains of chronic congestion.  Consider ENT consultation if symptoms worsen or persist.  Overview:  Multifactorial- gerd, AR, possible obstructive lung disease in a former smoker responsive to albuterol      6. Elevated hemidiaphragm  Stable. Observe.  Overview:  - chronic; right sided      7. Non-seasonal allergic rhinitis, unspecified trigger  The current medical regimen is effective;  continue present plan and medications.     8. Chronic nasal congestion  I will check a CT scan of his sinuses and address results accordingly.  -     CT Medtronic Sinuses without; Future; Expected date:  04/26/2023    9. Mild anxiety  The current medical regimen is effective;  continue present plan and medications.     10. Prediabetes  Lab Results   Component Value Date    HGBA1C 5.6 04/21/2023     Stable. We discussed low sugar/low carbohydrate diet and regular exercise to prevent progression. No need for prescription medication at this time.  Check A1c yearly.    11. Gastroesophageal reflux disease without esophagitis  Symptoms controlled: yes - but needs to take medication on a fairly regular basis to control symptoms.   Reflux precautions discussed (eliminate tobacco if a smoker; minimize caffeine, chocolate and red/white peppermint intake; avoid heavy and spicy meals; don't lay down within 2-3 hours after eating; minimize the intake of NSAIDs). Medication as needed.   Patient asked to take medication breaks, if possible - discussed chronic use can limit calcium absorption (which can lead to osteopenia/osteoporosis), increases the risk for intestinal infections, and can cause kidney damage. There are also some newer studies that show possible increased risk of mortality.    12. Osteopenia of neck of femur, unspecified laterality/13. Vitamin D deficiency  We discussed adequate calcium and vitamin D supplementation. We discussed fall precautions. He is up to date on his BMD. Continue current treatment regimen (yearly Reclast infusion - due now).  Overview:  - 1/2021 - s/p Reclast infusion  - 2/2022 - BMD recommends to continue yearly Reclast infusion      14. Overweight (BMI 25.0-29.9)  BMI Readings from Last 3 Encounters:   04/26/23 27.17 kg/m²   02/16/23 26.47 kg/m²   02/21/22 27.08 kg/m²     The patient is asked to continue to make an attempt to improve diet and exercise patterns to aid in medical management of this problem.     15. Personal history of tobacco use  I reviewed with the patient the [place] Preventative Services Task Force Recommendation on Lung Cancer Screening With Low-Dose Computed  Tomography.  Patient meets eligibility criteria as per current CMS guidelines for subsequent LDCT lung cancer screening (age 50-80; asymptomatic; tobacco smoking hx of at least 30 pack years; current smoker or one who has quit smoking within the last 15 years).  Benefits and harms of screening discussed with patient, including follow-up diagnostic testing, over-diagnosis, false positive rate, and total radiation exposure.  Patient counseled on the importance of adherence to annual lung cancer LDCT screening, impact of comorbidities and ability or willingness to undergo diagnosis and treatment.  Patient counseled on the importance of maintaining cigarette smoking abstinence if former smoker; or the importance of smoking cessation if current smoker and, if appropriate, furnishing of information about tobacco cessation interventions  All questions answered.   Patient wishes to proceed with continued annual surveillance testing.  -     CT Chest Lung Screening Low Dose; Future; Expected date: 04/26/2023    16. Iron deficiency anemia, unspecified iron deficiency anemia type  Advised patient to resume iron supplementation.    I discussed with the patient that his PSA was normal, but increased from previous.  I will try to contact his urologist to see if we need to follow-up on this.  He did see them recently and exam of his prostate was normal at that time.           Orders Placed This Encounter   Procedures    CT Medtronic Sinuses without    CT Chest Lung Screening Low Dose    Hypertension Digital Medicine ([facility]) Enrollment Order       Follow up in about 1 year (around 4/26/2024), or if symptoms worsen or fail to improve, for annual exam. or sooner as needed.

## 2023-05-04 ENCOUNTER — PATIENT MESSAGE (OUTPATIENT)
Dept: FAMILY MEDICINE | Facility: CLINIC | Age: 73
End: 2023-05-04
Payer: MEDICARE

## 2023-05-04 ENCOUNTER — PATIENT MESSAGE (OUTPATIENT)
Dept: ADMINISTRATIVE | Facility: OTHER | Age: 73
End: 2023-05-04
Payer: MEDICARE

## 2023-05-16 ENCOUNTER — HOSPITAL ENCOUNTER (OUTPATIENT)
Dept: RADIOLOGY | Facility: HOSPITAL | Age: 73
Discharge: HOME | End: 2023-05-16
Attending: INTERNAL MEDICINE
Payer: MEDICARE

## 2023-05-16 DIAGNOSIS — Z87.891 PERSONAL HISTORY OF TOBACCO USE: ICD-10-CM

## 2023-05-16 DIAGNOSIS — R05.3 CHRONIC COUGH: Primary | ICD-10-CM

## 2023-05-16 DIAGNOSIS — R09.81 CHRONIC NASAL CONGESTION: ICD-10-CM

## 2023-05-16 PROBLEM — J43.9 MILD EMPHYSEMA: Status: ACTIVE | Noted: 2023-05-16

## 2023-05-16 PROCEDURE — 70486 CT MAXILLOFACIAL W/O DYE: CPT | Mod: 26,,, | Performed by: RADIOLOGY

## 2023-05-16 PROCEDURE — 71271 CT THORAX LUNG CANCER SCR C-: CPT | Mod: TC

## 2023-05-16 PROCEDURE — 70486 CT MAXILLOFACIAL W/O DYE: CPT | Mod: TC

## 2023-05-16 PROCEDURE — 70486 CT MEDTRONIC SINUSES WITHOUT: ICD-10-PCS | Mod: 26,,, | Performed by: RADIOLOGY

## 2023-05-16 PROCEDURE — 71271 CT THORAX LUNG CANCER SCR C-: CPT | Mod: 26,,, | Performed by: RADIOLOGY

## 2023-05-16 PROCEDURE — 71271 CT CHEST LUNG SCREENING LOW DOSE: ICD-10-PCS | Mod: 26,,, | Performed by: RADIOLOGY

## 2023-05-22 ENCOUNTER — OFFICE VISIT (OUTPATIENT)
Dept: FAMILY MEDICINE | Facility: CLINIC | Age: 73
End: 2023-05-22
Payer: MEDICARE

## 2023-05-22 VITALS
OXYGEN SATURATION: 95 % | WEIGHT: 207.81 LBS | SYSTOLIC BLOOD PRESSURE: 120 MMHG | DIASTOLIC BLOOD PRESSURE: 60 MMHG | TEMPERATURE: 99 F | HEART RATE: 101 BPM | BODY MASS INDEX: 27.41 KG/M2

## 2023-05-22 DIAGNOSIS — I10 ESSENTIAL HYPERTENSION: ICD-10-CM

## 2023-05-22 DIAGNOSIS — Z71.89 ADVANCED DIRECTIVES, COUNSELING/DISCUSSION: ICD-10-CM

## 2023-05-22 DIAGNOSIS — Z00.00 ENCOUNTER FOR PREVENTIVE HEALTH EXAMINATION: ICD-10-CM

## 2023-05-22 DIAGNOSIS — K21.9 GASTROESOPHAGEAL REFLUX DISEASE WITHOUT ESOPHAGITIS: ICD-10-CM

## 2023-05-22 DIAGNOSIS — Z00.00 ENCOUNTER FOR MEDICARE ANNUAL WELLNESS EXAM: Primary | ICD-10-CM

## 2023-05-22 DIAGNOSIS — F41.9 MILD ANXIETY: ICD-10-CM

## 2023-05-22 DIAGNOSIS — E55.9 VITAMIN D DEFICIENCY: ICD-10-CM

## 2023-05-22 DIAGNOSIS — R73.03 PREDIABETES: ICD-10-CM

## 2023-05-22 DIAGNOSIS — J43.9 MILD EMPHYSEMA: ICD-10-CM

## 2023-05-22 DIAGNOSIS — R05.3 CHRONIC COUGH: ICD-10-CM

## 2023-05-22 PROCEDURE — 99999 PR PBB SHADOW E&M-EST. PATIENT-LVL IV: ICD-10-PCS | Mod: PBBFAC,,, | Performed by: NURSE PRACTITIONER

## 2023-05-22 PROCEDURE — 4010F PR ACE/ARB THEARPY RXD/TAKEN: ICD-10-PCS | Mod: CPTII,S$GLB,, | Performed by: NURSE PRACTITIONER

## 2023-05-22 PROCEDURE — 1126F AMNT PAIN NOTED NONE PRSNT: CPT | Mod: CPTII,S$GLB,, | Performed by: NURSE PRACTITIONER

## 2023-05-22 PROCEDURE — 3078F PR MOST RECENT DIASTOLIC BLOOD PRESSURE < 80 MM HG: ICD-10-PCS | Mod: CPTII,S$GLB,, | Performed by: NURSE PRACTITIONER

## 2023-05-22 PROCEDURE — 1159F PR MEDICATION LIST DOCUMENTED IN MEDICAL RECORD: ICD-10-PCS | Mod: CPTII,S$GLB,, | Performed by: NURSE PRACTITIONER

## 2023-05-22 PROCEDURE — 99999 PR PBB SHADOW E&M-EST. PATIENT-LVL IV: CPT | Mod: PBBFAC,,, | Performed by: NURSE PRACTITIONER

## 2023-05-22 PROCEDURE — 3078F DIAST BP <80 MM HG: CPT | Mod: CPTII,S$GLB,, | Performed by: NURSE PRACTITIONER

## 2023-05-22 PROCEDURE — 1170F PR FUNCTIONAL STATUS ASSESSED: ICD-10-PCS | Mod: CPTII,S$GLB,, | Performed by: NURSE PRACTITIONER

## 2023-05-22 PROCEDURE — 1159F MED LIST DOCD IN RCRD: CPT | Mod: CPTII,S$GLB,, | Performed by: NURSE PRACTITIONER

## 2023-05-22 PROCEDURE — 3074F SYST BP LT 130 MM HG: CPT | Mod: CPTII,S$GLB,, | Performed by: NURSE PRACTITIONER

## 2023-05-22 PROCEDURE — 3044F HG A1C LEVEL LT 7.0%: CPT | Mod: CPTII,S$GLB,, | Performed by: NURSE PRACTITIONER

## 2023-05-22 PROCEDURE — 3288F FALL RISK ASSESSMENT DOCD: CPT | Mod: CPTII,S$GLB,, | Performed by: NURSE PRACTITIONER

## 2023-05-22 PROCEDURE — 3074F PR MOST RECENT SYSTOLIC BLOOD PRESSURE < 130 MM HG: ICD-10-PCS | Mod: CPTII,S$GLB,, | Performed by: NURSE PRACTITIONER

## 2023-05-22 PROCEDURE — G0439 PR MEDICARE ANNUAL WELLNESS SUBSEQUENT VISIT: ICD-10-PCS | Mod: S$GLB,,, | Performed by: NURSE PRACTITIONER

## 2023-05-22 PROCEDURE — 3008F BODY MASS INDEX DOCD: CPT | Mod: CPTII,S$GLB,, | Performed by: NURSE PRACTITIONER

## 2023-05-22 PROCEDURE — 4010F ACE/ARB THERAPY RXD/TAKEN: CPT | Mod: CPTII,S$GLB,, | Performed by: NURSE PRACTITIONER

## 2023-05-22 PROCEDURE — 1170F FXNL STATUS ASSESSED: CPT | Mod: CPTII,S$GLB,, | Performed by: NURSE PRACTITIONER

## 2023-05-22 PROCEDURE — 1126F PR PAIN SEVERITY QUANTIFIED, NO PAIN PRESENT: ICD-10-PCS | Mod: CPTII,S$GLB,, | Performed by: NURSE PRACTITIONER

## 2023-05-22 PROCEDURE — 3288F PR FALLS RISK ASSESSMENT DOCUMENTED: ICD-10-PCS | Mod: CPTII,S$GLB,, | Performed by: NURSE PRACTITIONER

## 2023-05-22 PROCEDURE — 3044F PR MOST RECENT HEMOGLOBIN A1C LEVEL <7.0%: ICD-10-PCS | Mod: CPTII,S$GLB,, | Performed by: NURSE PRACTITIONER

## 2023-05-22 PROCEDURE — 1101F PR PT FALLS ASSESS DOC 0-1 FALLS W/OUT INJ PAST YR: ICD-10-PCS | Mod: CPTII,S$GLB,, | Performed by: NURSE PRACTITIONER

## 2023-05-22 PROCEDURE — 99214 OFFICE O/P EST MOD 30 MIN: CPT | Mod: PO | Performed by: NURSE PRACTITIONER

## 2023-05-22 PROCEDURE — 1101F PT FALLS ASSESS-DOCD LE1/YR: CPT | Mod: CPTII,S$GLB,, | Performed by: NURSE PRACTITIONER

## 2023-05-22 PROCEDURE — 3008F PR BODY MASS INDEX (BMI) DOCUMENTED: ICD-10-PCS | Mod: CPTII,S$GLB,, | Performed by: NURSE PRACTITIONER

## 2023-05-22 PROCEDURE — G0439 PPPS, SUBSEQ VISIT: HCPCS | Mod: S$GLB,,, | Performed by: NURSE PRACTITIONER

## 2023-05-22 NOTE — PROGRESS NOTES
HPI     Chief Complaint:  AWV    Vinnie Scott is a 73 y.o. male with multiple medical diagnoses as listed in the medical history and problem list that presented for a Medicare AWV and comprehensive Health Risk Assessment today.  Pt is new to me but is known to this clinic with his last appointment being 4/26/2023.      The following components were reviewed and updated:    Medical history  Family History  Social history  Allergies and Current Medications  Health Risk Assessment  Health Maintenance  Care Team         Assessment & Plan   (all problems are new to me)    Diagnoses and health risks identified today and associated recommendations/orders:    Problem List Items Addressed This Visit          Psychiatric    Mild anxiety    Encouraged the patient to perform self-calming techniques, such as deep breathing/relaxation techniques and exercise.    The current medical regimen is effective;  continue present plan         Pulmonary    Mild emphysema    The current medical regimen is effective;  continue present plan    Followed by pulmonology    Overview     - noted on CT scan of lungs 5/2023           Chronic cough    The current medical regimen is effective;  continue present plan    Followed by pulmonology    Overview     Multifactorial- gerd, AR, possible obstructive lung disease in a former smoker responsive to albuterol              Cardiac/Vascular    Essential hypertension    BP Readings from Last 3 Encounters:   05/22/23 120/60   04/26/23 112/68   02/16/23 (!) 88/57       -continue current medication regimen  -DASH diet, regular cardiovascular exercises, portion control  - ?weight loss  -f/u with BP logs in 2 weeks          Endocrine    Vitamin D deficiency    The current medical regimen is effective;  continue present plan      Prediabetes    Patient does have a Hx of prediabtes, which we discussed increased risk for diabetes in the future, therefore, I recommend dietary modification such as  lowering carbohydrates in diet (pasta, rice, bread, potatoes, crackers, cookies, candy, soda, etc.) to decrease the risk of progression to diabetes.             GI    Gastroesophageal reflux disease without esophagitis    -stable, discussed with patient about avoiding potential dietary triggers  -avoid spicy/greasy/sour/acidic foods, as well as tea/coffee/chocolate if possible  -Tylenol as needed for pain, avoid NSAIDs  -Keep food diary       Other Visit Diagnoses       Encounter for Medicare annual wellness exam    -  Primary    Counseled on age appropriate medical preventative services including age appropriate cancer screenings, age appropriate eye and dental exams, over all nutritional health, need for a consistent exercise regimen, and an over all push towards maintaining a vigorous and active lifestyle.  Counseled on age appropriate vaccines and discussed upcoming health care needs based on age/gender. Discussed good sleep hygiene and stress management.      Advanced directives, counseling/discussion        I offered to discuss advanced care planning, including how to pick a person who would make decisions for you if you were unable to make them for yourself, called a health care power of , and what kind of decisions you might make such as use of life sustaining treatments such as ventilators and tube feeding when faced with a life limiting illness recorded on a living will that they will need to know. (How you want to be cared for as you near the end of your natural life)     X Patient is interested in learning more about how to make advanced directives.  I provided them paperwork and offered to discuss this with them.      Encounter for preventive health examination        Counseled on age appropriate medical preventative services including age appropriate cancer screenings, age appropriate eye and dental exams, over all nutritional health, need for a consistent exercise regimen, and an over all push  towards maintaining a vigorous and active lifestyle.  Counseled on age appropriate vaccines and discussed upcoming health care needs based on age/gender. Discussed good sleep hygiene and stress management.                --------------------------------------------    ** See Completed Assessments for Annual Wellness Visit within the encounter summary.**    The following assessments were completed:  Living Situation  CAGE  Depression Screening  Timed Get Up and Go  Whisper Test  Cognitive Function Screening  Nutrition Screening  ADL Screening  PAQ Screening      Provided Vinnie Luu Tyler  with a 5-10 year written screening schedule and personal prevention plan. Recommendations were developed using the USPSTF age appropriate recommendations. Education, counseling, and referrals were provided as needed. After Visit Summary printed and given to patient which includes a list of additional screenings\tests needed.    Review for Opioid Screening: Pt does not have Rx for Opioids     Review for Substance Use Disorders: Patient does not abuse use substances    Exam     Review of Systems:  (as noted above)  Review of Systems   Constitutional:  Negative for fever.   HENT:  Negative for trouble swallowing.    Eyes:  Negative for visual disturbance.   Respiratory:  Positive for cough (chronic). Negative for chest tightness and shortness of breath.    Cardiovascular:  Negative for chest pain.   Gastrointestinal:  Negative for blood in stool.     Physical Exam:   Physical Exam  Constitutional:       General: He is not in acute distress.     Appearance: He is not ill-appearing or diaphoretic.   HENT:      Head: Normocephalic and atraumatic.   Eyes:      General: No scleral icterus.  Cardiovascular:      Rate and Rhythm: Normal rate and regular rhythm.      Pulses: Normal pulses.      Heart sounds: No murmur heard.    No friction rub. No gallop.   Pulmonary:      Effort: No respiratory distress.   Chest:      Chest wall: No  tenderness.   Musculoskeletal:      Cervical back: No rigidity.   Skin:     Capillary Refill: Capillary refill takes 2 to 3 seconds.   Neurological:      General: No focal deficit present.      Mental Status: He is alert and oriented to person, place, and time.     Vitals:    05/22/23 1026   BP: 120/60   BP Location: Left arm   Patient Position: Sitting   BP Method: Medium (Manual)   Pulse: 101   Temp: 98.5 °F (36.9 °C)   TempSrc: Oral   SpO2: 95%   Weight: 94.3 kg (207 lb 12.5 oz)      Body mass index is 27.41 kg/m².    Clock:                Health Maintenance:  Health Maintenance         Date Due Completion Date    High Dose Statin Never done ---    COVID-19 Vaccine (4 - Booster for Moderna series) 04/06/2022 2/9/2022    Colorectal Cancer Screening 08/20/2023 8/20/2020    DEXA Scan 01/31/2024 1/31/2022    PROSTATE-SPECIFIC ANTIGEN 04/21/2024 4/21/2023    Lipid Panel 04/21/2024 4/21/2023    Hemoglobin A1c 04/21/2024 4/21/2023    LDCT Lung Screen 05/16/2024 5/16/2023    TETANUS VACCINE 04/13/2025 4/13/2015            Advised patient on the importance of completing overdue health maintenance items      Follow Up:  Follow up in about 3 months (around 8/22/2023), or if symptoms worsen or fail to improve.    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    Mild anxiety     Mild emphysema 5/16/2023    Overweight(278.02)     Personal history of colonic polyps October 2010, May 2015    Prediabetes     Urinary anastomotic stricture        Past Surgical History:  Past Surgical History:   Procedure Laterality Date    ADENOIDECTOMY      COLONOSCOPY N/A 5/31/2017     Procedure: COLONOSCOPY;  Surgeon: Fatoumata Cook MD;  Location: [facility] ENDO;  Service: Endoscopy;  Laterality: N/A;    CYSTOGRAM N/A 10/15/2021    Procedure: CYSTOGRAM;  Surgeon: Eligio Culp Jr., MD;  Location: [address];  Service: Urology;  Laterality: N/A;    CYSTOSCOPY N/A 10/15/2021    Procedure: CYSTOSCOPY;  Surgeon: Eligio Culp Jr., MD;  Location: [facility] OR [address] [place] [address];  Service: Urology;  Laterality: N/A;    DILATION OF URETHRA N/A 10/15/2021    Procedure: DILATION, URETHRA;  Surgeon: Eligio Culp Jr., MD;  Location: [facility] OR [address] [place] [address];  Service: Urology;  Laterality: N/A;    FRACTURE SURGERY      Left collarbone    left ankle fracture repair Left 2018    TONSILLECTOMY      urinary stricture clearing      VASECTOMY         Social History:  Social History     Socioeconomic History    Marital status:     Number of children: 2   Occupational History    Occupation: [employer]     Employer: [employer]   Tobacco Use    Smoking status: Former     Packs/day: 1.00     Years: 45.00     Pack years: 45.00     Types: Cigarettes     Quit date: 2011     Years since quittin.6    Smokeless tobacco: Never   Substance and Sexual Activity    Alcohol use: Yes     Comment: 4-5 beers daily    Drug use: No     Social Determinants of Health     Financial Resource Strain: Low Risk     Difficulty of Paying Living Expenses: Not hard at all   Food Insecurity: No Food Insecurity    Worried About Running Out of Food in the Last Year: Never true    Ran Out of Food in the Last Year: Never true   Transportation Needs: No Transportation Needs    Lack of Transportation (Medical): No    Lack of Transportation (Non-Medical): No   Physical Activity: Insufficiently Active    Days of Exercise per Week: 2 days    Minutes of Exercise per Session: 10 min   Stress: No Stress Concern Present    Feeling of Stress : Not at all   Social Connections: Socially Integrated    Frequency of  Communication with Friends and Family: More than three times a week    Frequency of Social Gatherings with Friends and Family: Once a week    Attends [employer] Services: More than 4 times per year    Active Member of Clubs or Organizations: Yes    Attends Club or Organization Meetings: More than 4 times per year    Marital Status:    Housing Stability: Low Risk     Unable to Pay for Housing in the Last Year: No    Number of Places Lived in the Last Year: 1    Unstable Housing in the Last Year: No       Family History:  Family History   Problem Relation Age of Onset    Lung cancer Father     Diabetes Father     Uterine cancer Mother     Heart disease Mother     Drug abuse Brother     Alcohol abuse Brother     Prostate cancer Neg Hx     Colon cancer Neg Hx        Allergies and Medications: (updated and reviewed)  Review of patient's allergies indicates:   Allergen Reactions    Ciprofloxacin      Other reaction(s): Muscle pain    Lisinopril Other (See Comments)     cough     Current Outpatient Medications   Medication Sig Dispense Refill    amLODIPine (NORVASC) 5 MG tablet TAKE 1 TABLET(5 MG) BY MOUTH EVERY DAY 90 tablet 0    aspirin (ECOTRIN) 81 MG EC tablet Take 1 tablet (81 mg total) by mouth once daily. 90 tablet 3    citalopram (CELEXA) 20 MG tablet Take 1 tablet (20 mg total) by mouth once daily. 90 tablet 0    fluticasone propionate (FLONASE) 50 mcg/actuation nasal spray 1 spray (50 mcg total) by Each Nostril route once daily. 48 g 0    losartan (COZAAR) 100 MG tablet Take 1 tablet (100 mg total) by mouth once daily. 90 tablet 0    pantoprazole (PROTONIX) 40 MG tablet       albuterol (PROVENTIL/VENTOLIN HFA) 90 mcg/actuation inhaler Inhale 2 puffs into the lungs every 6 (six) hours as needed for Wheezing or Shortness of Breath. Rescue (Patient not taking: Reported on 4/26/2023) 18 g 0    atorvastatin (LIPITOR) 10 MG tablet Take 1 tablet (10 mg total) by mouth once daily. 90 tablet 0    fexofenadine  (ALLEGRA) 180 MG tablet Take 1 tablet (180 mg total) by mouth once daily. (Patient not taking: Reported on 4/26/2023) 90 tablet 0    nystatin-triamcinolone (MYCOLOG II) cream Apply topically 4 (four) times daily. (Patient not taking: Reported on 4/26/2023) 30 g 4    omeprazole (PRILOSEC OTC) 20 MG tablet Take 20 mg by mouth daily as needed.      oxybutynin (DITROPAN) 5 MG Tab Take 1 tablet (5 mg total) by mouth 3 (three) times daily as needed (bladder spasms). (Patient not taking: Reported on 4/26/2023) 30 tablet 0     No current facility-administered medications for this visit.           - The patient is given an After Visit Summary that lists all medications with directions, allergies, education, orders placed during this encounter and follow-up instructions.      - I have reviewed the patient's medical information including past medical, family, and social history sections including the medications and allergies.      - We discussed the patient's current medications.     This note was created by combination of typed  and MModal dictation.  Transcription errors may be present.  If there are any questions, please contact me.       Robin Cabezas NP

## 2023-05-22 NOTE — PATIENT INSTRUCTIONS
Counseling and Referral of Other Preventative  (Italic type indicates deductible and co-insurance are waived)    Patient Name: Vinnie Scott  Today's Date: 5/22/2023    Health Maintenance         Date Due Completion Date    High Dose Statin Never done ---    COVID-19 Vaccine (4 - Booster for Moderna series) 04/06/2022 2/9/2022    Colorectal Cancer Screening 08/20/2023 8/20/2020    DEXA Scan 01/31/2024 1/31/2022    PROSTATE-SPECIFIC ANTIGEN 04/21/2024 4/21/2023    Lipid Panel 04/21/2024 4/21/2023    Hemoglobin A1c 04/21/2024 4/21/2023    LDCT Lung Screen 05/16/2024 5/16/2023    TETANUS VACCINE 04/13/2025 4/13/2015          No orders of the defined types were placed in this encounter.      Patient Education        Yearly Physical for Adults   About this topic   Most people do not want to be sick. Having a checkup each year with your doctor is one way to help you stay healthy. You may need to see your doctor more or less often. How often you need to go to the doctor depends on your age. Your family and medical history also play a role in how often you need to go to the doctor. Going to see your doctor on a routine basis can help you find problems early or even before they start. This may make it easier to treat or cure your problem.  General   Your doctor will talk about many things during your checkup. Your doctor may ask about:  Your medical and family history.  All the drugs you are taking. Be sure to include all prescription, over the counter, and herbal supplements. Tell the doctor if you have any drug allergy. Bring a list of drugs you take with you.  How you are feeling and if you are having any problems.  Risky behaviors like smoking, drinking alcohol, using illegal drugs, not wearing seatbelts, having unprotected sex, etc.  Your doctor will do a physical exam and may check your:  Height and weight  Blood pressure  Reflexes  Memory  Vision  Hearing  Your doctor may order:  Lab tests  ECG to check your heart  rhythm  X-rays  Tests or treatments based on your exam  What lifestyle changes are needed?   Your doctor may suggest you make changes to your lifestyle at this visit. The doctor may talk with you about being more active or lowering stress levels. Ask your doctor what you need to do.  What drugs may be needed?   Your doctor may order drugs or vaccines to protect you from illnesses.  What changes to diet are needed?   Talk to your doctor to see if any changes are needed to your diet.  When do I need to call the doctor?   Call your doctor if you need to learn about any test results. Together you can make a plan for more care.  Helpful tips   Make a list of questions for your doctor before you go. This will help you remember to ask about any concerns. Write down any answers from your doctor so you can look over them after your visit.   Tell your doctor about any changes in your body or health since your last visit.  Ask your doctor about any screening tests you need.  Where can I learn more?   American Academy of Family Physicians  http://familydoctor.org/familydoctor/en/prevention-wellness/staying-healthy/healthy-living/preventive-services-for-healthy-living.printerview.html   Centers for Disease Control  http://www.cdc.gov/family/checkup/   Last Reviewed Date   2019-04-22  Consumer Information Use and Disclaimer   This information is not specific medical advice and does not replace information you receive from your health care provider. This is only a brief summary of general information. It does NOT include all information about conditions, illnesses, injuries, tests, procedures, treatments, therapies, discharge instructions or life-style choices that may apply to you. You must talk with your health care provider for complete information about your health and treatment options. This information should not be used to decide whether or not to accept your health care providers advice, instructions or recommendations.  Only your health care provider has the knowledge and training to provide advice that is right for you.  Copyright   Copyright © 2021 UpToDate, Inc. and its affiliates and/or licensors. All rights reserved.     The following information is provided to all patients.  This information is to help you find resources for any of the problems found today that may be affecting your health:                Living healthy guide: www.[facility].[place].[facility]      Understanding Diabetes: www.diabetes.org      Eating healthy: www.cdc.gov/healthyweight      [facility] home safety checklist: www.cdc.gov/steadi/patient.html      Agency on Aging: www.goea.[place].[facility]      Alcoholics anonymous (AA): www.aa.org      Physical Activity: www.pradeep.nih.gov/oj4rfjf      Tobacco use: www.quitwithusla.org

## 2023-05-29 ENCOUNTER — INFUSION (OUTPATIENT)
Dept: INFUSION THERAPY | Facility: HOSPITAL | Age: 73
End: 2023-05-29
Attending: INTERNAL MEDICINE
Payer: MEDICARE

## 2023-05-29 VITALS
TEMPERATURE: 99 F | OXYGEN SATURATION: 95 % | HEART RATE: 110 BPM | DIASTOLIC BLOOD PRESSURE: 67 MMHG | SYSTOLIC BLOOD PRESSURE: 104 MMHG | RESPIRATION RATE: 18 BRPM

## 2023-05-29 DIAGNOSIS — M85.859 OSTEOPENIA OF NECK OF FEMUR, UNSPECIFIED LATERALITY: Primary | ICD-10-CM

## 2023-05-29 PROCEDURE — 63600175 PHARM REV CODE 636 W HCPCS: Performed by: INTERNAL MEDICINE

## 2023-05-29 PROCEDURE — 96374 THER/PROPH/DIAG INJ IV PUSH: CPT

## 2023-05-29 RX ORDER — HEPARIN 100 UNIT/ML
500 SYRINGE INTRAVENOUS
OUTPATIENT
Start: 2023-05-29

## 2023-05-29 RX ORDER — ZOLEDRONIC ACID 5 MG/100ML
5 INJECTION, SOLUTION INTRAVENOUS
OUTPATIENT
Start: 2023-05-29

## 2023-05-29 RX ORDER — SODIUM CHLORIDE 0.9 % (FLUSH) 0.9 %
10 SYRINGE (ML) INJECTION
OUTPATIENT
Start: 2023-05-29

## 2023-05-29 RX ORDER — ZOLEDRONIC ACID 5 MG/100ML
5 INJECTION, SOLUTION INTRAVENOUS
Status: COMPLETED | OUTPATIENT
Start: 2023-05-29 | End: 2023-05-29

## 2023-05-29 RX ADMIN — ZOLEDRONIC ACID 5 MG: 0.05 INJECTION, SOLUTION INTRAVENOUS at 01:05

## 2023-05-29 NOTE — PLAN OF CARE
Pt received his yearly Reclast. Denies any recent falls. Is taking his calcium and vitamin d at home as directed. Most recent calcium and creatinine levels reviewed. VSS. Reclast tolerated well. No issues noted. Pt discharged from unit in [facility].

## 2023-06-02 ENCOUNTER — OFFICE VISIT (OUTPATIENT)
Dept: PULMONOLOGY | Facility: CLINIC | Age: 73
End: 2023-06-02
Payer: MEDICARE

## 2023-06-02 VITALS
HEART RATE: 120 BPM | OXYGEN SATURATION: 97 % | HEIGHT: 73 IN | SYSTOLIC BLOOD PRESSURE: 84 MMHG | BODY MASS INDEX: 27.49 KG/M2 | WEIGHT: 207.44 LBS | DIASTOLIC BLOOD PRESSURE: 49 MMHG

## 2023-06-02 DIAGNOSIS — R05.3 CHRONIC COUGH: Primary | ICD-10-CM

## 2023-06-02 DIAGNOSIS — R09.81 NASAL CONGESTION: ICD-10-CM

## 2023-06-02 DIAGNOSIS — R06.09 DYSPNEA ON EXERTION: ICD-10-CM

## 2023-06-02 PROCEDURE — 99999 PR PBB SHADOW E&M-EST. PATIENT-LVL III: CPT | Mod: PBBFAC,,, | Performed by: INTERNAL MEDICINE

## 2023-06-02 PROCEDURE — 1101F PR PT FALLS ASSESS DOC 0-1 FALLS W/OUT INJ PAST YR: ICD-10-PCS | Mod: CPTII,S$GLB,, | Performed by: INTERNAL MEDICINE

## 2023-06-02 PROCEDURE — 3008F PR BODY MASS INDEX (BMI) DOCUMENTED: ICD-10-PCS | Mod: CPTII,S$GLB,, | Performed by: INTERNAL MEDICINE

## 2023-06-02 PROCEDURE — 3078F DIAST BP <80 MM HG: CPT | Mod: CPTII,S$GLB,, | Performed by: INTERNAL MEDICINE

## 2023-06-02 PROCEDURE — 3044F HG A1C LEVEL LT 7.0%: CPT | Mod: CPTII,S$GLB,, | Performed by: INTERNAL MEDICINE

## 2023-06-02 PROCEDURE — 1159F PR MEDICATION LIST DOCUMENTED IN MEDICAL RECORD: ICD-10-PCS | Mod: CPTII,S$GLB,, | Performed by: INTERNAL MEDICINE

## 2023-06-02 PROCEDURE — 3074F PR MOST RECENT SYSTOLIC BLOOD PRESSURE < 130 MM HG: ICD-10-PCS | Mod: CPTII,S$GLB,, | Performed by: INTERNAL MEDICINE

## 2023-06-02 PROCEDURE — 99215 OFFICE O/P EST HI 40 MIN: CPT | Mod: S$GLB,,, | Performed by: INTERNAL MEDICINE

## 2023-06-02 PROCEDURE — 3078F PR MOST RECENT DIASTOLIC BLOOD PRESSURE < 80 MM HG: ICD-10-PCS | Mod: CPTII,S$GLB,, | Performed by: INTERNAL MEDICINE

## 2023-06-02 PROCEDURE — 3288F PR FALLS RISK ASSESSMENT DOCUMENTED: ICD-10-PCS | Mod: CPTII,S$GLB,, | Performed by: INTERNAL MEDICINE

## 2023-06-02 PROCEDURE — 1126F AMNT PAIN NOTED NONE PRSNT: CPT | Mod: CPTII,S$GLB,, | Performed by: INTERNAL MEDICINE

## 2023-06-02 PROCEDURE — 1101F PT FALLS ASSESS-DOCD LE1/YR: CPT | Mod: CPTII,S$GLB,, | Performed by: INTERNAL MEDICINE

## 2023-06-02 PROCEDURE — 3288F FALL RISK ASSESSMENT DOCD: CPT | Mod: CPTII,S$GLB,, | Performed by: INTERNAL MEDICINE

## 2023-06-02 PROCEDURE — 3074F SYST BP LT 130 MM HG: CPT | Mod: CPTII,S$GLB,, | Performed by: INTERNAL MEDICINE

## 2023-06-02 PROCEDURE — 99215 PR OFFICE/OUTPT VISIT, EST, LEVL V, 40-54 MIN: ICD-10-PCS | Mod: S$GLB,,, | Performed by: INTERNAL MEDICINE

## 2023-06-02 PROCEDURE — 4010F PR ACE/ARB THEARPY RXD/TAKEN: ICD-10-PCS | Mod: CPTII,S$GLB,, | Performed by: INTERNAL MEDICINE

## 2023-06-02 PROCEDURE — 3008F BODY MASS INDEX DOCD: CPT | Mod: CPTII,S$GLB,, | Performed by: INTERNAL MEDICINE

## 2023-06-02 PROCEDURE — 4010F ACE/ARB THERAPY RXD/TAKEN: CPT | Mod: CPTII,S$GLB,, | Performed by: INTERNAL MEDICINE

## 2023-06-02 PROCEDURE — 1126F PR PAIN SEVERITY QUANTIFIED, NO PAIN PRESENT: ICD-10-PCS | Mod: CPTII,S$GLB,, | Performed by: INTERNAL MEDICINE

## 2023-06-02 PROCEDURE — 1159F MED LIST DOCD IN RCRD: CPT | Mod: CPTII,S$GLB,, | Performed by: INTERNAL MEDICINE

## 2023-06-02 PROCEDURE — 99999 PR PBB SHADOW E&M-EST. PATIENT-LVL III: ICD-10-PCS | Mod: PBBFAC,,, | Performed by: INTERNAL MEDICINE

## 2023-06-02 PROCEDURE — 3044F PR MOST RECENT HEMOGLOBIN A1C LEVEL <7.0%: ICD-10-PCS | Mod: CPTII,S$GLB,, | Performed by: INTERNAL MEDICINE

## 2023-06-02 RX ORDER — FLUTICASONE PROPIONATE AND SALMETEROL 250; 50 UG/1; UG/1
1 POWDER RESPIRATORY (INHALATION) 2 TIMES DAILY
Qty: 60 EACH | Refills: 3 | Status: SHIPPED | OUTPATIENT
Start: 2023-06-02 | End: 2024-06-01

## 2023-06-02 NOTE — PROGRESS NOTES
Vinnie Scott  was seen as a new patient to me for the evaluation of  cough.    CHIEF COMPLAINT:  Cough      HISTORY OF PRESENT ILLNESS: Vinnie Scott is a 73 y.o. male  has a past medical history of AR (allergic rhinitis), Broken rib, Chronic knee pain, GERD (gastroesophageal reflux disease), Hearing loss in right ear, History of shingles, Hypertension, Meatal stenosis, Mild anxiety, Mild emphysema (5/16/2023), Overweight(278.02), Personal history of colonic polyps (October 2010, May 2015), Prediabetes, and Urinary anastomotic stricture.  Patient was seen by STACEY Hooper in 2021 for chronic cough.      Cough x several years.  Year round.  Cough is nonproductive.   No fever/chill.  Worse at night of after eating.  CT sinus 5/16/23 with patchy mucosal thickening.  +gerd due esophagitis.  Routinely walk 1/4 mile every other day.  No chest pain.  No orthopnea.  No pnd.      Additional Pulmonary History:   Occupational/Environmental Exposures:  retire  for ship[employer]  Exposure to Animals/Pets:  2 dogs and cat  Foreign Travel History:  [place] in 1970  History of exposures to TB:  none   Family History of Lung Cancer:  father with lung cancer   Tobacco:  1 ppd x 45 years.  Quit 2011  Childhood history of Lung Disease:  none  Chest surgery or trauma:  h/o right pneumothorax s/p fall.        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    Essential hypertension     Prediabetes     Elevated LFTs 07/05/2017    Asymptomatic PVD (peripheral vascular disease) 12/02/2019    Aortic arch atherosclerosis 12/02/2019    Elevated hemidiaphragm 12/02/2019    Osteopenia of femoral neck 01/09/2020    Vitamin D deficiency 01/12/2020    Chronic cough 02/07/2021    Pulmonary  nodules/lesions, multiple 02/07/2021    Urethral meatal stenosis 10/15/2021    Mild emphysema 05/16/2023    Nasal congestion 06/02/2023     Resolved Ambulatory Problems     Diagnosis Date Noted    Personal history of colonic polyps 10/01/2010    Chest pain at rest 01/20/2013    Colon cancer screening 05/31/2017     Past Medical History:   Diagnosis Date    Broken rib     GERD (gastroesophageal reflux disease)     Hearing loss in right ear     History of shingles     Hypertension     Meatal stenosis     Overweight(278.02)     Urinary anastomotic stricture                 PAST SURGICAL HISTORY:    Past Surgical History:   Procedure Laterality Date    ADENOIDECTOMY      COLONOSCOPY N/A 5/31/2017    Procedure: COLONOSCOPY;  Surgeon: Fatoumata Cook MD;  Location: [facility];  Service: Endoscopy;  Laterality: N/A;    CYSTOGRAM N/A 10/15/2021    Procedure: CYSTOGRAM;  Surgeon: Eligio Culp Jr., MD;  Location: [address];  Service: Urology;  Laterality: N/A;    CYSTOSCOPY N/A 10/15/2021    Procedure: CYSTOSCOPY;  Surgeon: Eligio Culp Jr., MD;  Location: [address];  Service: Urology;  Laterality: N/A;    DILATION OF URETHRA N/A 10/15/2021    Procedure: DILATION, URETHRA;  Surgeon: Eligio Culp Jr., MD;  Location: [address];  Service: Urology;  Laterality: N/A;    FRACTURE SURGERY      Left collarbone    left ankle fracture repair Left 12/2018    TONSILLECTOMY      urinary stricture clearing      VASECTOMY           FAMILY HISTORY:                Family History   Problem Relation Age of Onset    Lung cancer Father     Diabetes Father     Uterine cancer Mother     Heart disease Mother     Drug abuse Brother     Alcohol abuse Brother     Prostate cancer Neg Hx     Colon cancer Neg Hx        SOCIAL HISTORY:          Tobacco:   Social History     Tobacco Use   Smoking Status Former    Packs/day: 1.00    Years: 45.00    Pack years: 45.00    Types: Cigarettes    Quit date: 9/20/2011    Years since  quittin.7   Smokeless Tobacco Never     alcohol use:    Social History     Substance and Sexual Activity   Alcohol Use Yes    Comment: 4-5 beers daily                   ALLERGIES:    Review of patient's allergies indicates:   Allergen Reactions    Ciprofloxacin      Other reaction(s): Muscle pain    Lisinopril Other (See Comments)     cough       CURRENT MEDICATIONS:    Current Outpatient Medications   Medication Sig Dispense Refill    albuterol (PROVENTIL/VENTOLIN HFA) 90 mcg/actuation inhaler Inhale 2 puffs into the lungs every 6 (six) hours as needed for Wheezing or Shortness of Breath. Rescue (Patient not taking: Reported on 2023) 18 g 0    amLODIPine (NORVASC) 5 MG tablet TAKE 1 TABLET(5 MG) BY MOUTH EVERY DAY 90 tablet 0    aspirin (ECOTRIN) 81 MG EC tablet Take 1 tablet (81 mg total) by mouth once daily. 90 tablet 3    atorvastatin (LIPITOR) 10 MG tablet Take 1 tablet (10 mg total) by mouth once daily. 90 tablet 0    citalopram (CELEXA) 20 MG tablet Take 1 tablet (20 mg total) by mouth once daily. 90 tablet 0    fexofenadine (ALLEGRA) 180 MG tablet Take 1 tablet (180 mg total) by mouth once daily. (Patient not taking: Reported on 2023) 90 tablet 0    fluticasone propionate (FLONASE) 50 mcg/actuation nasal spray 1 spray (50 mcg total) by Each Nostril route once daily. 48 g 0    fluticasone-salmeterol diskus inhaler 250-50 mcg Inhale 1 puff into the lungs 2 (two) times daily. 60 each 3    losartan (COZAAR) 100 MG tablet Take 1 tablet (100 mg total) by mouth once daily. 90 tablet 0    nystatin-triamcinolone (MYCOLOG II) cream Apply topically 4 (four) times daily. (Patient not taking: Reported on 2023) 30 g 4    omeprazole (PRILOSEC OTC) 20 MG tablet Take 20 mg by mouth daily as needed.      oxybutynin (DITROPAN) 5 MG Tab Take 1 tablet (5 mg total) by mouth 3 (three) times daily as needed (bladder spasms). (Patient not taking: Reported on 2023) 30 tablet 0    pantoprazole (PROTONIX) 40 MG  "tablet        No current facility-administered medications for this visit.                  REVIEW OF SYSTEMS:     Pulmonary related symptoms as per HPI.  Gen:  no weight loss, no fever, no night sweat  HEENT:  no visual changes, no sore throat, + hearing loss  CV:  per hpi  GI:  no melena, no hematochezia, no diarhea, no constipation.  :  no dysuria, no hematuria, no hesistancy, no dribbling  Neuro:  no syncope, no vertigo, no tinitus  Psych:  No homocide or suicide ideation; no depression.  Endocrine:  No heat or cold intolerance.  Sleep:  + snoring; no witnessed apnea.  Rested upon awake  Otherwise, a balance of systems reviewed is negative.          PHYSICAL EXAM:  Vitals:    06/02/23 1354   BP: (!) 84/49   Pulse: (!) 120   SpO2: 97%   Weight: 94.1 kg (207 lb 7.3 oz)   Height: 6' 1" (1.854 m)   PainSc: 0-No pain     Body mass index is 27.37 kg/m².     GENERAL:  well develop; no apparent distress  HEENT:  +mild nasal congestion; no discharge noted; class 2 modified mallampatti.   NECK:  supple; no palpable masses.  CARDIO: regular rate and rhythm  PULM:  clear to auscultation bilaterally; no intercostals retractions; no accessory muscle usage   ABDOMEN:  soft nontender/nondistended.  +bowel sound  EXTREMITIES no cce  NEURO:  CN II-XII intact.  5/5 motor in all extremities.  sensation grossly intact   to light touch.  PSYCH:  normal affect.  Alert and oriented x 4    LABS  Pulmonary Functions Testing Results(personally reviewed):    PFT 2/18/21 Ratio of 77%; FVC 3.82 L (83%); FEV1 2.92 L (84%); TLC 5.44 L (72%); dlco 13.7 (47%)   ABG (personally reviewed):  none  CT CHEST(personally reviewed):  4/26/23 dependent atelectasis; scattered subcentimeter nodule.  Stable when compared to 12/17/21    ASSESSMENT/PLAN  Problem List Items Addressed This Visit       Chronic cough - Primary    Overview     etiology unclear.  Ct sinus with patch mucosal thickening.  Optimize nasal congestion.  F/u with .  pft " without obstruction with decreased dlco.  Will start advair since cough improve with albuterol.    Repeat pft and echo due to decreased dlco         Relevant Medications    fluticasone-salmeterol diskus inhaler 250-50 mcg    Other Relevant Orders    Complete PFT with bronchodilator    Nasal congestion    Overview     ct sinus with folcal mucosal thickening.  Sinus irrigation and nasal steroid.   Ent appointment with Dr. Harding.            Other Visit Diagnoses       Dyspnea on exertion        Relevant Orders    Echo Saline Bubble? No            Patient will No follow-ups on file.     35 minutes of total time spent on the encounter, which includes face to face time and non-face to face time preparing to see the patient (eg, review of tests), Obtaining and/or reviewing separately obtained history, documenting clinical information in the electronic or other health record, independently interpreting results (not separately reported) and communicating results to the patient/family/caregiver, or Care coordination (not separately reported).

## 2023-06-02 NOTE — PATIENT INSTRUCTIONS
1.  NeilMed Sinus Rinse Regular Kit    Recipe 1/4 teaspoon of salt and 1/4 teaspoon of baking soda in distilled water.  Be sure to tilt head forward as shown in picture.          flonase or nasonex over the counter.  2 sprays per nostril daily. Be sure to tilt head forward when dispensing medication.

## 2023-06-06 ENCOUNTER — PATIENT OUTREACH (OUTPATIENT)
Dept: ADMINISTRATIVE | Facility: HOSPITAL | Age: 73
End: 2023-06-06
Payer: MEDICARE

## 2023-06-06 DIAGNOSIS — I70.0 AORTIC ARCH ATHEROSCLEROSIS: ICD-10-CM

## 2023-06-06 RX ORDER — ATORVASTATIN CALCIUM 10 MG/1
10 TABLET, FILM COATED ORAL DAILY
Qty: 90 TABLET | Refills: 2 | Status: SHIPPED | OUTPATIENT
Start: 2023-06-06 | End: 2023-08-02 | Stop reason: SDUPTHER

## 2023-06-06 NOTE — PROGRESS NOTES
RE: Statin Therapy  Received: Today  MD Carmelita Schultz LPN  Thanks for bringing this to my attention.   I just saw him on April at which time he reported taking it and I advised he should continue. He just needed a refill - I sent it to the pharmacy for him.           Previous Messages       ----- Message -----   From: Carmelita Moreno LPN   Sent: 2023   6:38 AM CDT   To: Janine Acuña MD   Subject: Statin Therapy                                   Good Morning Dr. Acuña!     The patient is listed on the non-compliant report for need for statin therapy. Patient has a diagnosis of ASCVD. Please review need for statin therapy. If patient has allergy/intolerance to statin, please document in problem list and add to allergy section. The patient's current prescription has  on 2023.     Thanks in advance,   Carmelita

## 2023-06-06 NOTE — PROGRESS NOTES
[facility] 1 Statin Report 23 - current prescription has , a message was sent to Dr. Acuña for review and advice.

## 2023-06-21 ENCOUNTER — HOSPITAL ENCOUNTER (OUTPATIENT)
Dept: RESPIRATORY THERAPY | Facility: HOSPITAL | Age: 73
Discharge: HOME | End: 2023-06-21
Attending: INTERNAL MEDICINE
Payer: MEDICARE

## 2023-06-21 ENCOUNTER — HOSPITAL ENCOUNTER (OUTPATIENT)
Dept: CARDIOLOGY | Facility: HOSPITAL | Age: 73
Discharge: HOME | End: 2023-06-21
Attending: INTERNAL MEDICINE
Payer: MEDICARE

## 2023-06-21 VITALS — WEIGHT: 207 LBS | BODY MASS INDEX: 27.43 KG/M2 | HEIGHT: 73 IN

## 2023-06-21 VITALS — OXYGEN SATURATION: 99 % | HEART RATE: 94 BPM | RESPIRATION RATE: 18 BRPM

## 2023-06-21 DIAGNOSIS — R05.3 CHRONIC COUGH: ICD-10-CM

## 2023-06-21 DIAGNOSIS — R06.09 DYSPNEA ON EXERTION: ICD-10-CM

## 2023-06-21 LAB
ASCENDING AORTA: 3.32 CM
AV INDEX (PROSTH): 0.95
AV MEAN GRADIENT: 3 MMHG
AV PEAK GRADIENT: 5 MMHG
AV VALVE AREA: 3.04 CM2
AV VELOCITY RATIO: 0.89
BSA FOR ECHO PROCEDURE: 2.2 M2
CV ECHO LV RWT: 0.59 CM
DOP CALC AO PEAK VEL: 1.13 M/S
DOP CALC AO VTI: 18 CM
DOP CALC LVOT AREA: 3.2 CM2
DOP CALC LVOT DIAMETER: 2.02 CM
DOP CALC LVOT PEAK VEL: 1.01 M/S
DOP CALC LVOT STROKE VOLUME: 54.77 CM3
DOP CALC MV VTI: 13.8 CM
DOP CALCLVOT PEAK VEL VTI: 17.1 CM
E WAVE DECELERATION TIME: 220.74 MSEC
E/A RATIO: 0.98
E/E' RATIO: 8.14 M/S
ECHO LV POSTERIOR WALL: 1.24 CM (ref 0.6–1.1)
EJECTION FRACTION: 60 %
FRACTIONAL SHORTENING: 31 % (ref 28–44)
INTERVENTRICULAR SEPTUM: 1.25 CM (ref 0.6–1.1)
IVC DIAMETER: 2.01 CM
LA MAJOR: 4.92 CM
LA MINOR: 5.23 CM
LEFT ATRIUM SIZE: 3.58 CM
LEFT INTERNAL DIMENSION IN SYSTOLE: 2.93 CM (ref 2.1–4)
LEFT VENTRICLE DIASTOLIC VOLUME INDEX: 36.35 ML/M2
LEFT VENTRICLE DIASTOLIC VOLUME: 79.25 ML
LEFT VENTRICLE MASS INDEX: 87 G/M2
LEFT VENTRICLE SYSTOLIC VOLUME INDEX: 15.1 ML/M2
LEFT VENTRICLE SYSTOLIC VOLUME: 33.02 ML
LEFT VENTRICULAR INTERNAL DIMENSION IN DIASTOLE: 4.22 CM (ref 3.5–6)
LEFT VENTRICULAR MASS: 189.43 G
LV LATERAL E/E' RATIO: 7.13 M/S
LV SEPTAL E/E' RATIO: 9.5 M/S
LVOT MG: 2.03 MMHG
LVOT MV: 0.66 CM/S
MV MEAN GRADIENT: 1 MMHG
MV PEAK A VEL: 0.58 M/S
MV PEAK E VEL: 0.57 M/S
MV PEAK GRADIENT: 2 MMHG
MV STENOSIS PRESSURE HALF TIME: 64.01 MS
MV VALVE AREA BY CONTINUITY EQUATION: 3.97 CM2
MV VALVE AREA P 1/2 METHOD: 3.44 CM2
PV PEAK VELOCITY: 0.88 CM/S
RA PRESSURE: 8 MMHG
RA WIDTH: 4.9 CM
RV TISSUE DOPPLER FREE WALL SYSTOLIC VELOCITY 1 (APICAL 4 CHAMBER VIEW): 16.22 CM/S
SINUS: 2.75 CM
STJ: 2.08 CM
TDI LATERAL: 0.08 M/S
TDI SEPTAL: 0.06 M/S
TDI: 0.07 M/S
TRICUSPID ANNULAR PLANE SYSTOLIC EXCURSION: 1.56 CM

## 2023-06-21 PROCEDURE — 94060 EVALUATION OF WHEEZING: CPT

## 2023-06-21 PROCEDURE — 94727 GAS DIL/WSHOT DETER LNG VOL: CPT

## 2023-06-21 PROCEDURE — 25000242 PHARM REV CODE 250 ALT 637 W/ HCPCS: Performed by: INTERNAL MEDICINE

## 2023-06-21 PROCEDURE — 93306 ECHO (CUPID ONLY): ICD-10-PCS | Mod: 26,,, | Performed by: INTERNAL MEDICINE

## 2023-06-21 PROCEDURE — 93308 TTE F-UP OR LMTD: CPT

## 2023-06-21 PROCEDURE — 93306 TTE W/DOPPLER COMPLETE: CPT | Mod: 26,,, | Performed by: INTERNAL MEDICINE

## 2023-06-21 PROCEDURE — 94729 DIFFUSING CAPACITY: CPT

## 2023-06-21 RX ORDER — ALBUTEROL SULFATE 2.5 MG/.5ML
2.5 SOLUTION RESPIRATORY (INHALATION) ONCE
Status: COMPLETED | OUTPATIENT
Start: 2023-06-21 | End: 2023-06-21

## 2023-06-21 RX ADMIN — ALBUTEROL SULFATE 2.5 MG: 2.5 SOLUTION RESPIRATORY (INHALATION) at 10:06

## 2023-06-26 LAB
BRPFT: NORMAL
DLCO ADJ PRE: 17.95 ML/(MIN*MMHG)
DLCO SINGLE BREATH LLN: 22.45
DLCO SINGLE BREATH PRE REF: 52.1 %
DLCO SINGLE BREATH REF: 29.37
DLCOC SBVA LLN: 2.75
DLCOC SBVA PRE REF: 80.1 %
DLCOC SBVA REF: 3.81
DLCOC SINGLE BREATH LLN: 22.45
DLCOC SINGLE BREATH PRE REF: 61.1 %
DLCOC SINGLE BREATH REF: 29.37
DLCOVA LLN: 2.75
DLCOVA PRE REF: 68.4 %
DLCOVA PRE: 2.61 ML/(MIN*MMHG*L)
DLCOVA REF: 3.81
DLVAADJ PRE: 3.06 ML/(MIN*MMHG*L)
ERVN2 LLN: -16448.88
ERVN2 PRE REF: 24.1 %
ERVN2 PRE: 0.27 L
ERVN2 REF: 1.12
FEF 25 75 CHG: 14.1 %
FEF 25 75 LLN: 1.1
FEF 25 75 POST REF: 111.6 %
FEF 25 75 PRE REF: 97.8 %
FEF 25 75 REF: 2.56
FET100 CHG: -3.2 %
FEV1 CHG: 6.1 %
FEV1 FVC CHG: 1.3 %
FEV1 FVC LLN: 62
FEV1 FVC POST REF: 103.1 %
FEV1 FVC PRE REF: 101.8 %
FEV1 FVC REF: 75
FEV1 LLN: 2.49
FEV1 POST REF: 90.4 %
FEV1 PRE REF: 85.2 %
FEV1 REF: 3.47
FRCN2 LLN: 2.89
FRCN2 PRE REF: 58.8 %
FRCN2 REF: 3.88
FVC CHG: 4.8 %
FVC LLN: 3.43
FVC POST REF: 87.1 %
FVC PRE REF: 83.1 %
FVC REF: 4.63
IVC PRE: 3.68 L
IVC SINGLE BREATH LLN: 3.43
IVC SINGLE BREATH PRE REF: 79.5 %
IVC SINGLE BREATH REF: 4.63
PEF CHG: -3.4 %
PEF LLN: 6.4
PEF POST REF: 106.6 %
PEF PRE REF: 110.3 %
PEF REF: 8.91
POST FEF 25 75: 2.85 L/S
POST FET 100: 8.77 SEC
POST FEV1 FVC: 77.7 %
POST FEV1: 3.13 L
POST FVC: 4.03 L
POST PEF: 9.49 L/S
PRE DLCO: 15.31 ML/(MIN*MMHG)
PRE FEF 25 75: 2.5 L/S
PRE FET 100: 9.06 SEC
PRE FEV1 FVC: 76.72 %
PRE FEV1: 2.95 L
PRE FRC N2: 2.28 L
PRE FVC: 3.85 L
PRE PEF: 9.83 L/S
RVN2 LLN: 2.08
RVN2 PRE REF: 72.9 %
RVN2 PRE: 2.01 L
RVN2 REF: 2.76
RVN2TLCN2 LLN: 32.67
RVN2TLCN2 PRE REF: 83.2 %
RVN2TLCN2 PRE: 34.66 %
RVN2TLCN2 REF: 41.65
TLCN2 LLN: 6.55
TLCN2 PRE REF: 75.3 %
TLCN2 PRE: 5.8 L
TLCN2 REF: 7.7
VA PRE: 5.87 L
VA SINGLE BREATH LLN: 7.55
VA SINGLE BREATH PRE REF: 77.8 %
VA SINGLE BREATH REF: 7.55
VCMAXN2 LLN: 3.43
VCMAXN2 PRE REF: 81.8 %
VCMAXN2 PRE: 3.79 L
VCMAXN2 REF: 4.63

## 2023-06-26 PROCEDURE — 94727 PR PULM FUNCTION TEST BY GAS: ICD-10-PCS | Mod: 26,,, | Performed by: INTERNAL MEDICINE

## 2023-06-26 PROCEDURE — 94060 EVALUATION OF WHEEZING: CPT | Mod: 26,,, | Performed by: INTERNAL MEDICINE

## 2023-06-26 PROCEDURE — 94727 GAS DIL/WSHOT DETER LNG VOL: CPT | Mod: 26,,, | Performed by: INTERNAL MEDICINE

## 2023-06-26 PROCEDURE — 94729 PR C02/MEMBANE DIFFUSE CAPACITY: ICD-10-PCS | Mod: 26,,, | Performed by: INTERNAL MEDICINE

## 2023-06-26 PROCEDURE — 94060 PR EVAL OF BRONCHOSPASM: ICD-10-PCS | Mod: 26,,, | Performed by: INTERNAL MEDICINE

## 2023-06-26 PROCEDURE — 94729 DIFFUSING CAPACITY: CPT | Mod: 26,,, | Performed by: INTERNAL MEDICINE

## 2023-07-21 ENCOUNTER — OFFICE VISIT (OUTPATIENT)
Dept: OTOLARYNGOLOGY | Facility: CLINIC | Age: 73
End: 2023-07-21
Payer: MEDICARE

## 2023-07-21 VITALS
DIASTOLIC BLOOD PRESSURE: 76 MMHG | BODY MASS INDEX: 27.27 KG/M2 | WEIGHT: 206.69 LBS | SYSTOLIC BLOOD PRESSURE: 138 MMHG

## 2023-07-21 DIAGNOSIS — H61.23 BILATERAL IMPACTED CERUMEN: Primary | ICD-10-CM

## 2023-07-21 DIAGNOSIS — J34.3 HYPERTROPHY OF INFERIOR NASAL TURBINATE: ICD-10-CM

## 2023-07-21 DIAGNOSIS — R09.82 POSTNASAL DRIP: ICD-10-CM

## 2023-07-21 DIAGNOSIS — J30.2 SEASONAL ALLERGIC RHINITIS, UNSPECIFIED TRIGGER: ICD-10-CM

## 2023-07-21 DIAGNOSIS — R05.3 CHRONIC COUGH: ICD-10-CM

## 2023-07-21 PROCEDURE — 3288F PR FALLS RISK ASSESSMENT DOCUMENTED: ICD-10-PCS | Mod: CPTII,S$GLB,, | Performed by: OTOLARYNGOLOGY

## 2023-07-21 PROCEDURE — 1159F PR MEDICATION LIST DOCUMENTED IN MEDICAL RECORD: ICD-10-PCS | Mod: CPTII,S$GLB,, | Performed by: OTOLARYNGOLOGY

## 2023-07-21 PROCEDURE — 1101F PR PT FALLS ASSESS DOC 0-1 FALLS W/OUT INJ PAST YR: ICD-10-PCS | Mod: CPTII,S$GLB,, | Performed by: OTOLARYNGOLOGY

## 2023-07-21 PROCEDURE — 3075F PR MOST RECENT SYSTOLIC BLOOD PRESS GE 130-139MM HG: ICD-10-PCS | Mod: CPTII,S$GLB,, | Performed by: OTOLARYNGOLOGY

## 2023-07-21 PROCEDURE — 1101F PT FALLS ASSESS-DOCD LE1/YR: CPT | Mod: CPTII,S$GLB,, | Performed by: OTOLARYNGOLOGY

## 2023-07-21 PROCEDURE — 3288F FALL RISK ASSESSMENT DOCD: CPT | Mod: CPTII,S$GLB,, | Performed by: OTOLARYNGOLOGY

## 2023-07-21 PROCEDURE — 3044F HG A1C LEVEL LT 7.0%: CPT | Mod: CPTII,S$GLB,, | Performed by: OTOLARYNGOLOGY

## 2023-07-21 PROCEDURE — 4010F PR ACE/ARB THEARPY RXD/TAKEN: ICD-10-PCS | Mod: CPTII,S$GLB,, | Performed by: OTOLARYNGOLOGY

## 2023-07-21 PROCEDURE — 31575 PR LARYNGOSCOPY, FLEXIBLE; DIAGNOSTIC: ICD-10-PCS | Mod: S$GLB,,, | Performed by: OTOLARYNGOLOGY

## 2023-07-21 PROCEDURE — 1159F MED LIST DOCD IN RCRD: CPT | Mod: CPTII,S$GLB,, | Performed by: OTOLARYNGOLOGY

## 2023-07-21 PROCEDURE — 3078F PR MOST RECENT DIASTOLIC BLOOD PRESSURE < 80 MM HG: ICD-10-PCS | Mod: CPTII,S$GLB,, | Performed by: OTOLARYNGOLOGY

## 2023-07-21 PROCEDURE — 3078F DIAST BP <80 MM HG: CPT | Mod: CPTII,S$GLB,, | Performed by: OTOLARYNGOLOGY

## 2023-07-21 PROCEDURE — 99204 OFFICE O/P NEW MOD 45 MIN: CPT | Mod: 25,S$GLB,, | Performed by: OTOLARYNGOLOGY

## 2023-07-21 PROCEDURE — 4010F ACE/ARB THERAPY RXD/TAKEN: CPT | Mod: CPTII,S$GLB,, | Performed by: OTOLARYNGOLOGY

## 2023-07-21 PROCEDURE — 99204 PR OFFICE/OUTPT VISIT, NEW, LEVL IV, 45-59 MIN: ICD-10-PCS | Mod: 25,S$GLB,, | Performed by: OTOLARYNGOLOGY

## 2023-07-21 PROCEDURE — 31575 DIAGNOSTIC LARYNGOSCOPY: CPT | Mod: S$GLB,,, | Performed by: OTOLARYNGOLOGY

## 2023-07-21 PROCEDURE — 3075F SYST BP GE 130 - 139MM HG: CPT | Mod: CPTII,S$GLB,, | Performed by: OTOLARYNGOLOGY

## 2023-07-21 PROCEDURE — 3044F PR MOST RECENT HEMOGLOBIN A1C LEVEL <7.0%: ICD-10-PCS | Mod: CPTII,S$GLB,, | Performed by: OTOLARYNGOLOGY

## 2023-07-21 PROCEDURE — 3008F PR BODY MASS INDEX (BMI) DOCUMENTED: ICD-10-PCS | Mod: CPTII,S$GLB,, | Performed by: OTOLARYNGOLOGY

## 2023-07-21 PROCEDURE — 3008F BODY MASS INDEX DOCD: CPT | Mod: CPTII,S$GLB,, | Performed by: OTOLARYNGOLOGY

## 2023-07-21 RX ORDER — AZELASTINE 1 MG/ML
1 SPRAY, METERED NASAL 2 TIMES DAILY
Qty: 30 ML | Refills: 3 | Status: SHIPPED | OUTPATIENT
Start: 2023-07-21 | End: 2023-11-20 | Stop reason: SDUPTHER

## 2023-07-21 NOTE — PATIENT INSTRUCTIONS
"  Information and instructions from your visit with me today:    Start using the following medication nasal sprays:   Fluticasone spray:    This medication is a steroid spray. It stays within the nose and does not have absorption into the body that leads to side effects that one has with oral steroid medication. Fluticasone nasal spray is the same as the Flonase brand nasal spray. Discuss with your pharmacist if the price is lower over the counter or with a prescription ( this varies depending on insurance). The medication that is over the counter is the same as the prescription medication. Use this medication as instructed on the prescription, 1-2 sprays on each side of your nose twice daily.     Azelastine  spray:  This medication is an antihistamine used to treat nasal symptoms of allergy, which works specifically in the nose unlike antihistamine pills which have more of an effect on the whole body. Use this medication as instructed on the prescription, 1 spray on each side of your nose twice daily.     Additional instructions for medication sprays  Place the tip of the medication bottle in your nose and aim slightly up and out on each side to get medication high and deep into your nose and sinuses, and not have it all deposit in the very front of your nose. Aim the tip of the nozzle towards the outer corner of your eye . You can imagine aiming towards the back of your eyeball on each side for this, as opposed to straight back to the center of your nose and head.     You need to use this medication every day regardless of symptoms, as it takes time ( a few weeks) to work and get the benefits. It does not work on an "as needed" basis like taking a decongestant. If your symptoms only occur in a particular season, then the medication can be used seasonally instead of year long. For seasonal symptoms, you should start using the spray twice daily a month before when you normally have symptoms ( for example, if " symptoms start in August, should start at the end of June).     Start nasal irrigations with saline solution- you can either use a rinse or a mist spray:    SALINE SINUS RINSE (Lit Med brand): You should do a full bottle, half on one side of your nose and half on the other, 1-2 times per day (or more if able to, you cannot do this too much). Follow the instructions on the box: mix the salt packet with clean water (bottle, previously boiled, distilled, etc -- not tap water) to the line on the bottle to make the irrigation.         NASAL SALINE SPRAY ( simply saline and arm and hammer are examples) There are several different brands found in the cold and flu aisle of the pharmacy. You can use any brand of saline spray - this will deliver the saline by a gentle mist ( if you have difficulty or discomfort with nasal rinse/ a lot of fluid in the nose, this will be more comfortable).       Always rinse your nose with saline prior to using medication sprays and wait a couple of hours before using again. You can use the saline throughout the day to help with stuffy nose or dry nose.    Do not use nasal decongestant sprays such as Afrin or similar products long term ( over 3 days) .  This can cause long term physical nasal addiction. Afrin should only be used if having nose bleeds, severe nasal congestion , or severe ear pain/fullness and should not be used for more than 2-3 days in a row . It is a not a medication that should be used for a long period of time.          Clearing your throat leads to more swelling in the voice box.  This will worsen your symptoms.  You should try to minimize throat clearing as much as possible.    Can try gaviscon liquid  over the counter - take 15 minutes after a meal as needed for throat phlegm    Avoid mouthwash with alcohol such as listerine. You should use biotene mouth wash    Can sleep with bedside humidifier     You should aim to drink 2 to 3 liters of water daily if you are drinking  one cup of coffee.  If you have trouble drinking water, you can cut back or cut out caffeine. ouble drinking water, you can cut back or cut out caffeine.    Lozenges:  Halls Breezers - sold with cough drops, Can try sugar free lozenges with pectin ,  such as halls fruit breezers    Xylimelts, on toothpaste aisle, these are convenient for when you are talking and or sleeping - they stick to gumline and provide moisture - Sorbisense or Amazon        Steam and gargle - 3x day  You may consider getting a facial steamer, breathe in warm moist air  through mouth and nose to hydrate vocal folds. On amazon, I like the [employer] version that is under $25.        Gargle:   1/2 tsp each salt, baking soda, clear corn syrup, 6 oz warm water  Sip, gargle quietly, spit, repeat until gone, don't eat/drink for 30 minutes after.      Chew gum with baking soda after meals, Orbit White     Order online, when it's time to get more Gaviscon, either Alginate therapy such as Reflux Gourmet  or Gaviscon Advance can be used following meals and at bedtime for reflux coverage. The Acid Watcher Diet by Dr Cobian as well as the Chronic Cough Kirk by Dr Shaikh are good reads to gain improved understanding of dietary and behavioral changes that can aid in reduction of LPRD, and to better understand cough and cough control     www.[email or web address].com     It was nice meeting you today, and I look forward to helping you feel better soon. Please don't hesitate to call if you have any other questions or concerns, or if I can be of any assistance in the meantime.      Tamiko Harding MD    [facility]     Phone  [phone]    Fax      [phone]        Tamiko Harding MD  Otorhinolaryngology

## 2023-07-21 NOTE — PROGRESS NOTES
OTOLARYNGOLOGY CLINIC NOTE  Date:  07/21/2023     Chief complaint:  Chief Complaint   Patient presents with    Cough       History of Present Illness  Vinnie Scott is a 73 y.o. male  presenting today for a new evaluation and treatment of chronic cough     Chronic cough- seen by dr rossi PFEMMANUEL without obstruction .Inhaler has been helping with the cough  Gest nasal congestion and occasionally has rhinorrhea . Has postnasal drip at times but not constant.  No nosebleeds. Sometimes gets cheek pressure.   Misses some days of his nasal sprays Does saline first most of the time with the flonase. Uses otc allergy med  No prior allergy testing.     After had a root canal has some numbness of his left cheek area  +GERD. Doing 20 mg ppi .Did not notice change when started on ppi regarding cough . Has had EGD   Sometimes gets dry mouth  No issues with swallowing. No throat pain. No voice changes    No issue with snoring . No night time awakening with cough  No nocturia- once per night     Has hearing aids; tinnitus is right ear only has never had an mri . No ear pain or pressure  Has not had hearing aid in many years . Gets woozy sometimes when drives a while and gets out of car gets woozy if gets out too fast   Has seen ent in the past - sent to someone named dr morton has something starts with o but not sure if otosclerosis   Mastoid clear on recent scan     Use listerine tabs to help with bad taste from reflux      Past Medical History  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    Mild anxiety     Mild emphysema 5/16/2023    Overweight(278.02)     Personal history of colonic polyps October  2010, May 2015    Prediabetes     Urinary anastomotic stricture         Past Surgical History  Past Surgical History:   Procedure Laterality Date    ADENOIDECTOMY      COLONOSCOPY N/A 5/31/2017    Procedure: COLONOSCOPY;  Surgeon: Fatoumata Cook MD;  Location: [facility];  Service: Endoscopy;  Laterality: N/A;    CYSTOGRAM N/A 10/15/2021    Procedure: CYSTOGRAM;  Surgeon: Eligio Culp Jr., MD;  [facility];  Service: Urology;  Laterality: N/A;    CYSTOSCOPY N/A 10/15/2021    Procedure: CYSTOSCOPY;  Surgeon: Eligio Culp Jr., MD;  Location: [facility] OR [facility];  Service: Urology;  Laterality: N/A;    DILATION OF URETHRA N/A 10/15/2021    Procedure: DILATION, URETHRA;  Surgeon: Eligio Culp Jr., MD;  [facility];  Service: Urology;  Laterality: N/A;    FRACTURE SURGERY      Left collarbone    left ankle fracture repair Left 12/2018    TONSILLECTOMY      urinary stricture clearing      VASECTOMY          Medications  Current Outpatient Medications on File Prior to Visit   Medication Sig Dispense Refill    albuterol (PROVENTIL/VENTOLIN HFA) 90 mcg/actuation inhaler Inhale 2 puffs into the lungs every 6 (six) hours as needed for Wheezing or Shortness of Breath. Rescue (Patient not taking: Reported on 4/26/2023) 18 g 0    aspirin (ECOTRIN) 81 MG EC tablet Take 1 tablet (81 mg total) by mouth once daily. 90 tablet 3    atorvastatin (LIPITOR) 10 MG tablet Take 1 tablet (10 mg total) by mouth once daily. 90 tablet 2    citalopram (CELEXA) 20 MG tablet Take 1 tablet (20 mg total) by mouth once daily. 90 tablet 0    fexofenadine (ALLEGRA) 180 MG tablet Take 1 tablet (180 mg total) by mouth once daily. (Patient not taking: Reported on 4/26/2023) 90 tablet 0    fluticasone propionate (FLONASE) 50 mcg/actuation nasal spray 1 spray (50 mcg total) by Each Nostril route once daily. 48 g 0    fluticasone-salmeterol diskus inhaler 250-50 mcg Inhale 1 puff into the lungs 2 (two) times daily. 60 each  3    losartan (COZAAR) 100 MG tablet Take 1 tablet (100 mg total) by mouth once daily. 90 tablet 0    nystatin-triamcinolone (MYCOLOG II) cream Apply topically 4 (four) times daily. (Patient not taking: Reported on 4/26/2023) 30 g 4    omeprazole (PRILOSEC OTC) 20 MG tablet Take 20 mg by mouth daily as needed.      oxybutynin (DITROPAN) 5 MG Tab Take 1 tablet (5 mg total) by mouth 3 (three) times daily as needed (bladder spasms). (Patient not taking: Reported on 4/26/2023) 30 tablet 0    pantoprazole (PROTONIX) 40 MG tablet        No current facility-administered medications on file prior to visit.       Review of Systems  Review of Systems   Constitutional: Negative.    HENT:  Positive for hearing loss.    Eyes: Negative.    Respiratory:  Positive for cough.    Cardiovascular: Negative.    Gastrointestinal: Negative.    Genitourinary: Negative.    Skin: Negative.    Neurological:  Positive for dizziness.   Psychiatric/Behavioral: Negative.        Social History   reports that he quit smoking about 11 years ago. His smoking use included cigarettes. He has a 45.00 pack-year smoking history. He has never used smokeless tobacco. He reports current alcohol use. He reports that he does not use drugs.     Family History  Family History   Problem Relation Age of Onset    Lung cancer Father     Diabetes Father     Uterine cancer Mother     Heart disease Mother     Drug abuse Brother     Alcohol abuse Brother     Prostate cancer Neg Hx     Colon cancer Neg Hx         Physical Exam   Vitals:    07/21/23 1008   BP: 138/76    Body mass index is 27.27 kg/m².  Weight: 93.8 kg (206 lb 10.9 oz)         GENERAL: no acute distress.  HEAD: normocephalic.   EYES: lids and lashes normal. No scleral icterus  EARS: external ear without lesion, normal pinna shape and position.  External auditory canal with normal  Hard cerumen impaction right wear complete, partial on left   NOSE: external nose without significant bony abnormality  ORAL  CAVITY/OROPHARYNX: tongue midline and mobile. Symmetric palate rise. Uvula midline.   NECK: trachea midline.   LYMPH NODES:No cervical lymphadenopathy.  RESPIRATORY: no stridor, no stertor. Voice normal. Respirations nonlabored.  NEURO: alert, responds to questions appropriately.   PSYCH:mood appropriate    PROCEDURE NOTE  NAME OF PROCEDURE: Flexible Laryngoscopy, diagnostic  INDICATIONS: gag reflex precludes mirror exam,  cough  FINDINGS: little bit of saliva pooling near pyriform sinuses; no middle meatal edema no pseuodsulcus but some post cricoid edema /non specific signs of lpr like cobblestoning     Consent: After procedure was explained in detail and all questions answered, verbal consent was obtained for performing flexible laryngoscopy.  Anesthesia: topical 4% lidocaine and neosynephrine  Procedure: With patient in seated position, the scope was inserted into the bilateral nasal passageway and advanced atraumatically into the nasopharynx to examine the following structures:  Nasal cavity: Turbinates with moderate hypertrophy. no middle meatal edema. No purulent drainage.   Nasopharynx: no mass or lesion noted in nasopharynx.   Oropharynx: base of tongue without  mass or ulceration. Lingual tonsils normal in appearance  Hypopharynx: posterior pharyngeal wall with cobblestoning. Slight pooling of secretions. Pyriform sinuses visible without mass or lesion  Larynx: epiglottis normal without lesion. False vocal folds without edema/erythema/lesion. True vocal folds mobile and without lesion. Mild interarytenoid edema no erythema . Postcricoid region with mild edema no lesion   Subglottis: visualized portion of subglottis normal in appearance    After examination performed, the scope was removed atraumatically . The patient tolerated the procedure well. Photodocumentation obtained with representative images below, all images and/or videos uploaded in media section of epic.                                Imaging:  The patient does have any pertinent and/or recent imaging of the head and neck. [facility] [employer] sinus from 5-2023 images and report reviewed. Minimal mucosal thickening of maxillary sinus - extremely unremarkable. Had prior ct scan in 2021 when not on nasal spray regimen with ethmoid and sinus mucosal thickening all of that has resolved since last ct. Minimal mucosal thickening on floor of maxillary sinus. Unfortunately radiology cut off the teeth so I am unable to see if there is a tooth root going into the sinus which can cause this appearance.         2011     Labs:  CBC  Recent Labs   Lab 12/13/21  1109 04/21/23  1012   WBC 6.36 8.57   Hemoglobin 11.4 L 10.3 L   Hematocrit 37.4 L 35.0 L   MCV 84 83   Platelets 325 353     BMP  Recent Labs   Lab 12/11/20  1101 12/13/21  1109 04/21/23  1012   Glucose 91 94 106   Sodium 139 137 139   Potassium 4.1 4.8 4.6   Chloride 104 106 103   CO2 22 L 17 L 22 L   BUN 16 17 24 H   Creatinine 1.0 1.0 1.4   Calcium 8.9 9.5 9.1     COAGS        Assessment  1. Chronic cough  - Ambulatory referral/consult to ENT    2. Bilateral impacted cerumen    3. Seasonal allergic rhinitis, unspecified trigger    4. Hypertrophy of inferior nasal turbinate    5. Postnasal drip       Plan:  Discussed plan of care with patient in detail and all questions answered. Patient reported understanding of plan of care. I gave the patient the opportunity to ask questions and patient confirmed all questions answered to satisfaction.     Unable to clean ears today, significant impaction. Debrox for 5 days now and then prior to follow up     Recent Ct sinus extremely unremarkable. Unfortunately radiology cut off the teeth so I am unable to see if there is a tooth root going into the sinus which can cause this appearance. If odontogenic source, could be causing chronic sinus disease but would need to get tooth pulled to solve issue. Recent ct much better appearance compared to scan in 2011 when was  not on any medication regimen. Do not think sinuses would be source of his complaints - small change if odontogenic sinus disease but with minimal changes on imaging I think that is less likely. Cough improving with inhaler per his report  Add astelin to flonase. Discussed how to use these  F/u with dentist about possible conal ct   Saline prior to medication nasal sprays , emphasized importance of this and importance of daily use  Needs updated hearing test- unable to do today, will get at f/u    F/u 2-3 months with hearing test (ear cleaning before) and possible scope      Please be aware that this note has been generated with the assistance of MModal voice-to-text.  Please excuse any spelling or grammatical errors.

## 2023-08-02 DIAGNOSIS — F41.9 MILD ANXIETY: ICD-10-CM

## 2023-08-02 DIAGNOSIS — I10 ESSENTIAL HYPERTENSION: ICD-10-CM

## 2023-08-02 DIAGNOSIS — I70.0 AORTIC ARCH ATHEROSCLEROSIS: ICD-10-CM

## 2023-08-02 RX ORDER — ATORVASTATIN CALCIUM 10 MG/1
10 TABLET, FILM COATED ORAL DAILY
Qty: 90 TABLET | Refills: 2 | Status: SHIPPED | OUTPATIENT
Start: 2023-08-02 | End: 2023-11-20 | Stop reason: SDUPTHER

## 2023-08-02 RX ORDER — LOSARTAN POTASSIUM 100 MG/1
100 TABLET ORAL DAILY
Qty: 90 TABLET | Refills: 2 | Status: SHIPPED | OUTPATIENT
Start: 2023-08-02 | End: 2023-11-20 | Stop reason: SDUPTHER

## 2023-08-02 RX ORDER — CITALOPRAM 20 MG/1
20 TABLET, FILM COATED ORAL DAILY
Qty: 90 TABLET | Refills: 2 | Status: SHIPPED | OUTPATIENT
Start: 2023-08-02 | End: 2023-11-20 | Stop reason: SDUPTHER

## 2023-08-02 NOTE — TELEPHONE ENCOUNTER
Refill Decision Note   Vinnie Oramous  is requesting a refill authorization.  Brief Assessment and Rationale for Refill:  Approve     Medication Therapy Plan:         Comments:     Note composed:3:43 PM 08/02/2023

## 2023-08-02 NOTE — TELEPHONE ENCOUNTER
No care due was identified.  [facility] Embedded Care Due Messages. Reference number: [id number].   8/02/2023 2:09:37 PM CDT

## 2023-10-03 ENCOUNTER — PATIENT MESSAGE (OUTPATIENT)
Dept: FAMILY MEDICINE | Facility: CLINIC | Age: 73
End: 2023-10-03
Payer: MEDICARE

## 2023-10-19 ENCOUNTER — CLINICAL SUPPORT (OUTPATIENT)
Dept: AUDIOLOGY | Facility: CLINIC | Age: 73
End: 2023-10-19
Payer: MEDICARE

## 2023-10-19 ENCOUNTER — PATIENT MESSAGE (OUTPATIENT)
Dept: FAMILY MEDICINE | Facility: CLINIC | Age: 73
End: 2023-10-19
Payer: MEDICARE

## 2023-10-19 ENCOUNTER — OFFICE VISIT (OUTPATIENT)
Dept: OTOLARYNGOLOGY | Facility: CLINIC | Age: 73
End: 2023-10-19
Payer: MEDICARE

## 2023-10-19 VITALS
BODY MASS INDEX: 27.3 KG/M2 | DIASTOLIC BLOOD PRESSURE: 84 MMHG | WEIGHT: 206 LBS | SYSTOLIC BLOOD PRESSURE: 128 MMHG | HEIGHT: 73 IN

## 2023-10-19 DIAGNOSIS — H90.3 SENSORINEURAL HEARING LOSS (SNHL) OF BOTH EARS: ICD-10-CM

## 2023-10-19 DIAGNOSIS — R42 DIZZINESS: ICD-10-CM

## 2023-10-19 DIAGNOSIS — H61.23 BILATERAL IMPACTED CERUMEN: Primary | ICD-10-CM

## 2023-10-19 DIAGNOSIS — J34.3 HYPERTROPHY OF INFERIOR NASAL TURBINATE: ICD-10-CM

## 2023-10-19 DIAGNOSIS — H90.3 SENSORINEURAL HEARING LOSS (SNHL) OF BOTH EARS: Primary | ICD-10-CM

## 2023-10-19 DIAGNOSIS — J30.2 SEASONAL ALLERGIC RHINITIS, UNSPECIFIED TRIGGER: ICD-10-CM

## 2023-10-19 PROCEDURE — 3079F PR MOST RECENT DIASTOLIC BLOOD PRESSURE 80-89 MM HG: ICD-10-PCS | Mod: CPTII,S$GLB,, | Performed by: OTOLARYNGOLOGY

## 2023-10-19 PROCEDURE — 1159F PR MEDICATION LIST DOCUMENTED IN MEDICAL RECORD: ICD-10-PCS | Mod: CPTII,S$GLB,, | Performed by: OTOLARYNGOLOGY

## 2023-10-19 PROCEDURE — 1125F PR PAIN SEVERITY QUANTIFIED, PAIN PRESENT: ICD-10-PCS | Mod: CPTII,S$GLB,, | Performed by: OTOLARYNGOLOGY

## 2023-10-19 PROCEDURE — 4010F ACE/ARB THERAPY RXD/TAKEN: CPT | Mod: CPTII,S$GLB,, | Performed by: OTOLARYNGOLOGY

## 2023-10-19 PROCEDURE — 4010F PR ACE/ARB THEARPY RXD/TAKEN: ICD-10-PCS | Mod: CPTII,S$GLB,, | Performed by: OTOLARYNGOLOGY

## 2023-10-19 PROCEDURE — 69210 REMOVE IMPACTED EAR WAX UNI: CPT | Mod: S$GLB,,, | Performed by: OTOLARYNGOLOGY

## 2023-10-19 PROCEDURE — 3074F SYST BP LT 130 MM HG: CPT | Mod: CPTII,S$GLB,, | Performed by: OTOLARYNGOLOGY

## 2023-10-19 PROCEDURE — 92550 PR TYMPANOMETRY AND REFLEX THRESHOLD MEASUREMENTS: ICD-10-PCS | Mod: S$GLB,,,

## 2023-10-19 PROCEDURE — 99214 OFFICE O/P EST MOD 30 MIN: CPT | Mod: 25,S$GLB,, | Performed by: OTOLARYNGOLOGY

## 2023-10-19 PROCEDURE — 3079F DIAST BP 80-89 MM HG: CPT | Mod: CPTII,S$GLB,, | Performed by: OTOLARYNGOLOGY

## 2023-10-19 PROCEDURE — 92550 TYMPANOMETRY & REFLEX THRESH: CPT | Mod: S$GLB,,,

## 2023-10-19 PROCEDURE — 3008F BODY MASS INDEX DOCD: CPT | Mod: CPTII,S$GLB,, | Performed by: OTOLARYNGOLOGY

## 2023-10-19 PROCEDURE — 3288F FALL RISK ASSESSMENT DOCD: CPT | Mod: CPTII,S$GLB,, | Performed by: OTOLARYNGOLOGY

## 2023-10-19 PROCEDURE — 3288F PR FALLS RISK ASSESSMENT DOCUMENTED: ICD-10-PCS | Mod: CPTII,S$GLB,, | Performed by: OTOLARYNGOLOGY

## 2023-10-19 PROCEDURE — 3044F HG A1C LEVEL LT 7.0%: CPT | Mod: CPTII,S$GLB,, | Performed by: OTOLARYNGOLOGY

## 2023-10-19 PROCEDURE — 1101F PT FALLS ASSESS-DOCD LE1/YR: CPT | Mod: CPTII,S$GLB,, | Performed by: OTOLARYNGOLOGY

## 2023-10-19 PROCEDURE — 3008F PR BODY MASS INDEX (BMI) DOCUMENTED: ICD-10-PCS | Mod: CPTII,S$GLB,, | Performed by: OTOLARYNGOLOGY

## 2023-10-19 PROCEDURE — 1125F AMNT PAIN NOTED PAIN PRSNT: CPT | Mod: CPTII,S$GLB,, | Performed by: OTOLARYNGOLOGY

## 2023-10-19 PROCEDURE — 99214 PR OFFICE/OUTPT VISIT, EST, LEVL IV, 30-39 MIN: ICD-10-PCS | Mod: 25,S$GLB,, | Performed by: OTOLARYNGOLOGY

## 2023-10-19 PROCEDURE — 69210 PR REMOVAL IMPACTED CERUMEN REQUIRING INSTRUMENTATION, UNILATERAL: ICD-10-PCS | Mod: S$GLB,,, | Performed by: OTOLARYNGOLOGY

## 2023-10-19 PROCEDURE — 3044F PR MOST RECENT HEMOGLOBIN A1C LEVEL <7.0%: ICD-10-PCS | Mod: CPTII,S$GLB,, | Performed by: OTOLARYNGOLOGY

## 2023-10-19 PROCEDURE — 1101F PR PT FALLS ASSESS DOC 0-1 FALLS W/OUT INJ PAST YR: ICD-10-PCS | Mod: CPTII,S$GLB,, | Performed by: OTOLARYNGOLOGY

## 2023-10-19 PROCEDURE — 92557 PR COMPREHENSIVE HEARING TEST: ICD-10-PCS | Mod: S$GLB,,,

## 2023-10-19 PROCEDURE — 92557 COMPREHENSIVE HEARING TEST: CPT | Mod: S$GLB,,,

## 2023-10-19 PROCEDURE — 3074F PR MOST RECENT SYSTOLIC BLOOD PRESSURE < 130 MM HG: ICD-10-PCS | Mod: CPTII,S$GLB,, | Performed by: OTOLARYNGOLOGY

## 2023-10-19 PROCEDURE — 1159F MED LIST DOCD IN RCRD: CPT | Mod: CPTII,S$GLB,, | Performed by: OTOLARYNGOLOGY

## 2023-10-19 NOTE — PROGRESS NOTES
OTOLARYNGOLOGY CLINIC NOTE  Date:  10/19/2023     Chief complaint:  Chief Complaint   Patient presents with    Follow-up     3 month follow up for ear cleaning and allergies, allergies been ok but he forgets to do his sprays sometimes    Cerumen Impaction     Small amount bilateral       History of Present Illness  Vinnie Scott is a 73 y.o. male  presenting today for a followup.  Does nasal sprays but not daily; cough is much better  Sometimes gets ear fullness. Stopped the inhaler because it was expensive has been doing well without it as long as does nasal spray regimen     I originally saw the patient on 7-21 - 23. Below text is copied from initial note on that date describing history of present illness at time of presentation.   Chronic cough- seen by dr rossi PFEMMANUEL without obstruction .Inhaler has been helping with the cough  Gest nasal congestion and occasionally has rhinorrhea . Has postnasal drip at times but not constant.  No nosebleeds. Sometimes gets cheek pressure.   Misses some days of his nasal sprays Does saline first most of the time with the flonase. Uses otc allergy med  No prior allergy testing.      After had a root canal has some numbness of his left cheek area  +GERD. Doing 20 mg ppi .Did not notice change when started on ppi regarding cough . Has had EGD   Sometimes gets dry mouth  No issues with swallowing. No throat pain. No voice changes     No issue with snoring . No night time awakening with cough  No nocturia- once per night      Has hearing aids; tinnitus is right ear only has never had an mri . No ear pain or pressure  Has not had hearing aid in many years . Gets woozy sometimes when drives a while and gets out of car gets woozy if gets out too fast   Has seen ent in the past - sent to someone named dr morton has something starts with o but not sure if otosclerosis   Mastoid clear on recent scan      Use listerine tabs to help with bad taste from reflux    Past Medical  History  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    Mild anxiety     Mild emphysema 5/16/2023    Overweight(278.02)     Personal history of colonic polyps October 2010, May 2015    Prediabetes     Urinary anastomotic stricture         Past Surgical History  Past Surgical History:   Procedure Laterality Date    ADENOIDECTOMY      COLONOSCOPY N/A 5/31/2017    Procedure: COLONOSCOPY;  Surgeon: Fatoumata Cook MD;  Location: [facility];  Service: Endoscopy;  Laterality: N/A;    CYSTOGRAM N/A 10/15/2021    Procedure: CYSTOGRAM;  Surgeon: Eligio Culp Jr., MD;  Location: [facility] OR [address] [place] [address];  Service: Urology;  Laterality: N/A;    CYSTOSCOPY N/A 10/15/2021    Procedure: CYSTOSCOPY;  Surgeon: Eligio Culp Jr., MD;  Location: [facility] OR [address] [place] [address];  Service: Urology;  Laterality: N/A;    DILATION OF URETHRA N/A 10/15/2021    Procedure: DILATION, URETHRA;  Surgeon: Eligio Culp Jr., MD;  Location: [facility] OR [address] [place] [address];  Service: Urology;  Laterality: N/A;    FRACTURE SURGERY      Left collarbone    left ankle fracture repair Left 12/2018    TONSILLECTOMY      urinary stricture clearing      VASECTOMY          Medications  Current Outpatient Medications on File Prior to Visit   Medication Sig Dispense Refill    aspirin (ECOTRIN) 81 MG EC tablet Take 1 tablet (81 mg total) by mouth once daily. 90 tablet 3    atorvastatin (LIPITOR) 10 MG tablet Take 1 tablet (10 mg total) by mouth once daily. 90 tablet 2    azelastine (ASTELIN) 137 mcg (0.1 %) nasal spray 1 spray (137 mcg total) by Nasal route 2 (two) times daily. 30 mL 3    citalopram (CELEXA) 20 MG tablet Take 1 tablet (20 mg total) by mouth once daily.  90 tablet 2    fexofenadine (ALLEGRA) 180 MG tablet Take 1 tablet (180 mg total) by mouth once daily. 90 tablet 0    fluticasone propionate (FLONASE) 50 mcg/actuation nasal spray 1 spray (50 mcg total) by Each Nostril route once daily. 48 g 0    fluticasone-salmeterol diskus inhaler 250-50 mcg Inhale 1 puff into the lungs 2 (two) times daily. 60 each 3    losartan (COZAAR) 100 MG tablet Take 1 tablet (100 mg total) by mouth once daily. 90 tablet 2    pantoprazole (PROTONIX) 40 MG tablet       albuterol (PROVENTIL/VENTOLIN HFA) 90 mcg/actuation inhaler Inhale 2 puffs into the lungs every 6 (six) hours as needed for Wheezing or Shortness of Breath. Rescue (Patient not taking: Reported on 4/26/2023) 18 g 0    nystatin-triamcinolone (MYCOLOG II) cream Apply topically 4 (four) times daily. (Patient not taking: Reported on 4/26/2023) 30 g 4    omeprazole (PRILOSEC OTC) 20 MG tablet Take 20 mg by mouth daily as needed.      oxybutynin (DITROPAN) 5 MG Tab Take 1 tablet (5 mg total) by mouth 3 (three) times daily as needed (bladder spasms). (Patient not taking: Reported on 4/26/2023) 30 tablet 0     No current facility-administered medications on file prior to visit.       Review of Systems  Review of Systems   Constitutional: Negative.    HENT:  Positive for hearing loss.    Respiratory:  Positive for cough.    Cardiovascular: Negative.    Gastrointestinal: Negative.    Musculoskeletal:  Positive for back pain and neck pain.   Skin: Negative.    Neurological:  Positive for dizziness.   Psychiatric/Behavioral:  The patient is nervous/anxious.         Social History   reports that he quit smoking about 12 years ago. His smoking use included cigarettes. He started smoking about 57 years ago. He has a 45.0 pack-year smoking history. He has never used smokeless tobacco. He reports current alcohol use. He reports that he does not use drugs.     Family History  Family History   Problem Relation Age of Onset    Lung cancer Father   "   Diabetes Father     Uterine cancer Mother     Heart disease Mother     Drug abuse Brother     Alcohol abuse Brother     Prostate cancer Neg Hx     Colon cancer Neg Hx         Physical Exam   Vitals:    10/19/23 0922   BP: 128/84    Body mass index is 27.18 kg/m².  Weight: 93.4 kg (206 lb)   Height: 6' 1" (185.4 cm)     GENERAL: no acute distress.  HEAD: normocephalic.   EYES: No scleral icterus  EARS: external ear without lesion, normal pinna shape and position.  External auditory canal with near complate cerumen impaction  NOSE: external nose without significant bony abnormality; turbinate hyeprtrophy  ORAL CAVITY/OROPHARYNX: tongue mobile.   NECK: trachea midline.   LYMPH NODES:No cervical lymphadenopathy.  RESPIRATORY: no stridor, no stertor. Voice normal. Respirations nonlabored.  NEURO: alert, responds to questions appropriately.  Ac>BC   PSYCH:mood appropriate    Procedure Note   Procedure performed:examination of ears with cerumen disimpaction    Indication for procedure: bilateral cerumen impaction     Description of procedure:  After verbal consent was obtained and with the patient in seated position and the operating head otoscope was inserted into the right ear.  Otologic instruments including various size otologic suctions and curette were used to remove the cerumen from the right external auditory canals under visualization with the operating head otoscope. After cleaning, visualization was again performed  of the ear canal, tympanic membrane, ossicles and middle ear space. The same procedure was then repeated for the left ear. Findings as indicated below. All portions of the procedure and examination were tolerated well without complication.     Findings:  Right ear: near Complete cerumen impaction removed entirely revealing normal external auditory canal; tympanic membrane without bulging, retraction, or perforation; no evidence of middle ear fluid or effusion.   Left ear: near Complete cerumen " impaction removed entirely revealing normal external auditory canal; tympanic membrane without bulging, retraction, or perforation; no evidence of middle ear fluid or effusion.     AUDIOLOGY RESULTS  Audiometric evaluation including audiogram, tympanometry, acoustic reflexes, and speech discrimination which was performed 10-19-23 was personally reviewed and interpreted.  Notable findings on the audiogram were bilateral normal sloping to mild  sensorineural hearing loss (SNHL) at 500 h z and then steeply sloping to profound high frequency snhl. Slight asymmetry right ear worse than left at 6 khz.  Tympanometry revealed Type A/borderline type C tympanogram on the left and Type A/borderline type C  tympanogram on the right. Speech discrimination was 92%  on the left, and 100% on the right.     Report of the audiologist performing this audiometric testing was also reviewed . Prior hearing test from 2012 also reviewed and resolution of conductive component. Nerve component relatively similar compared to 2012 as well   2012 audio below      Imaging:  The patient does not have any new imaging of the head and neck since last visit.     Labs:  CBC  Recent Labs   Lab 12/13/21  1109 04/21/23  1012   WBC 6.36 8.57   Hemoglobin 11.4 L 10.3 L   Hematocrit 37.4 L 35.0 L   MCV 84 83   Platelets 325 353     BMP  Recent Labs   Lab 12/11/20  1101 12/13/21  1109 04/21/23  1012   Glucose 91 94 106   Sodium 139 137 139   Potassium 4.1 4.8 4.6   Chloride 104 106 103   CO2 22 L 17 L 22 L   BUN 16 17 24 H   Creatinine 1.0 1.0 1.4   Calcium 8.9 9.5 9.1     COAGS        Assessment  1. Bilateral impacted cerumen    2. Seasonal allergic rhinitis, unspecified trigger    3. Dizziness    4. Hypertrophy of inferior nasal turbinate    5. Sensorineural hearing loss (SNHL) of both ears       Plan:  Discussed plan of care with patient in detail and all questions answered. Patient reported understanding of plan of care. I gave the patient the  opportunity to ask questions and patient confirmed all questions answered to satisfaction.     Reclines in car some so trying waiting between laying down and standing if not working with car ride - possible 3PD vs generalized imbalance a-could try vestbiular therapy if desires. General vestibular exercises given will let me know if desires to do PT    Cleaned ears bilateral nerissa head : impaction- partial/near complete. Not as bad as prior. Brittni mueller    Has hearing aids  Had mastoid effusion in 2011 and concern for cholesteatoma,no effusion or issue on ct sinus 4-2023, dont see cholesteatoma or effusion on exam. Prior conductive component seen on 2012 hearing test resolved on todays test      Try nasal spray use more consistently and if still problem could consider surgery with turbinate reduction     F/u 6 months for ear cleaning- if not fully impacted can stretch out    I spent a total of 33 minutes on the day of the visit.  This includes face to face time and non-face to face time preparing to see the patient (eg, review of tests), obtaining and/or reviewing separately obtained history, documenting clinical information in the electronic or other health record, independently interpreting results and communicating results to the patient/family/caregiver, or care coordinator.   Please be aware that this note has been generated with the assistance of odal voice-to-text.  Please excuse any spelling or grammatical errors.

## 2023-10-19 NOTE — PROGRESS NOTES
Please click on link to view Audiogram:  Document on 10/19/2023 10:24 AM by Jesusita Truong AU.D: Audiogram       Mr. Vinnie Scott, a 73 y.o. male, was seen in the clinic today for an audiological evaluation. Mr. Scott's main complaint was bilateral hearing loss. Previous audiogram from 10/31/12 indicated a mild to profound mixed hearing loss (MHL) for the right ear and a mild to moderately-severe sensorineural hearing loss (SNHL) for the left ear. Patient currently utilizes binaural amplification purchased outside [facility].    Otoscopy clear bilaterally. Tympanometry testing revealed a Type Ad tympanogram for the right ear and a Type Ad tympanogram for the left ear. Ipsilateral acoustic reflexes were present at all tested frequencies bilaterally.     Audiological testing revealed a mild sloping to profound SNHL with a conductive component at 4000 Hz for the right ear and a mild sloping to severe SNHL for the left ear. A speech reception threshold was obtained at 50 dBHL for the right ear and at 50 dBHL for the left ear. Speech discrimination was 100% for the right ear and 92% for the left ear.      Recommendations:  1. Otologic evaluation  2. Annual audiological evaluation or sooner if hearing changes  3. Hearing protection when in noise   4. Follow up with hearing aid provider to have hearing aids reprogrammed based on today's audiogram

## 2023-10-29 ENCOUNTER — PATIENT MESSAGE (OUTPATIENT)
Dept: ADMINISTRATIVE | Facility: OTHER | Age: 73
End: 2023-10-29
Payer: MEDICARE

## 2023-11-02 ENCOUNTER — TELEPHONE (OUTPATIENT)
Dept: FAMILY MEDICINE | Facility: CLINIC | Age: 73
End: 2023-11-02
Payer: MEDICARE

## 2023-11-02 DIAGNOSIS — Z12.5 ENCOUNTER FOR SCREENING FOR MALIGNANT NEOPLASM OF PROSTATE: ICD-10-CM

## 2023-11-02 DIAGNOSIS — R97.20 INCREASED PROSTATE SPECIFIC ANTIGEN (PSA) VELOCITY: Primary | ICD-10-CM

## 2023-11-02 NOTE — TELEPHONE ENCOUNTER
Please call patient: he is due for a 6 month PSA repeat check. Also, please address colon cancer screening and advise him that I recommend he get the RSV vaccine.

## 2023-11-21 RX ORDER — AZELASTINE 1 MG/ML
1 SPRAY, METERED NASAL 2 TIMES DAILY
Qty: 30 ML | Refills: 11 | Status: SHIPPED | OUTPATIENT
Start: 2023-11-21

## 2023-11-30 DIAGNOSIS — I10 ESSENTIAL HYPERTENSION: ICD-10-CM

## 2023-12-01 ENCOUNTER — PATIENT MESSAGE (OUTPATIENT)
Dept: FAMILY MEDICINE | Facility: CLINIC | Age: 73
End: 2023-12-01
Payer: MEDICARE

## 2023-12-01 RX ORDER — LOSARTAN POTASSIUM 100 MG/1
100 TABLET ORAL DAILY
Qty: 10 TABLET | Refills: 0 | Status: SHIPPED | OUTPATIENT
Start: 2023-12-01 | End: 2024-03-19 | Stop reason: SDUPTHER

## 2023-12-01 NOTE — TELEPHONE ENCOUNTER
No care due was identified.  Health [facility] Embedded Care Due Messages. Reference number: [id number].   11/30/2023 8:24:58 PM CST

## 2023-12-30 ENCOUNTER — PATIENT MESSAGE (OUTPATIENT)
Dept: FAMILY MEDICINE | Facility: CLINIC | Age: 73
End: 2023-12-30
Payer: MEDICARE

## 2024-02-22 ENCOUNTER — TELEPHONE (OUTPATIENT)
Dept: FAMILY MEDICINE | Facility: CLINIC | Age: 74
End: 2024-02-22
Payer: MEDICARE

## 2024-02-22 NOTE — TELEPHONE ENCOUNTER
----- Message from Janine Acuña MD sent at 2/21/2024 11:42 AM CST -----  Patient needs to be seen in April of this year for annual - please schedule. Please address health maintenance, if applicable.

## 2024-03-19 DIAGNOSIS — I10 ESSENTIAL HYPERTENSION: ICD-10-CM

## 2024-03-19 DIAGNOSIS — I70.0 AORTIC ARCH ATHEROSCLEROSIS: ICD-10-CM

## 2024-03-19 DIAGNOSIS — F41.9 MILD ANXIETY: ICD-10-CM

## 2024-03-19 RX ORDER — CITALOPRAM 20 MG/1
20 TABLET, FILM COATED ORAL DAILY
Qty: 90 TABLET | Refills: 0 | Status: SHIPPED | OUTPATIENT
Start: 2024-03-19 | End: 2024-05-29 | Stop reason: SDUPTHER

## 2024-03-19 RX ORDER — LOSARTAN POTASSIUM 100 MG/1
100 TABLET ORAL DAILY
Qty: 90 TABLET | Refills: 0 | Status: SHIPPED | OUTPATIENT
Start: 2024-03-19 | End: 2024-05-29 | Stop reason: SDUPTHER

## 2024-03-19 RX ORDER — ATORVASTATIN CALCIUM 10 MG/1
10 TABLET, FILM COATED ORAL DAILY
Qty: 90 TABLET | Refills: 0 | Status: SHIPPED | OUTPATIENT
Start: 2024-03-19 | End: 2024-05-29 | Stop reason: SDUPTHER

## 2024-03-19 NOTE — TELEPHONE ENCOUNTER
No care due was identified.  Health [facility] Embedded Care Due Messages. Reference number: [id number].   3/19/2024 10:56:29 AM CDT

## 2024-03-19 NOTE — TELEPHONE ENCOUNTER
----- Message from Jadyn Thakkar sent at 3/18/2024  1:59 PM CDT -----  Regarding: Self [phone]  Type: Patient Call Back     Who called:     What is the request in detail: Pt called to check status of refill request. Pt stated he only has one pressure pill.     Can the clinic reply by [facility]? Yes     Would the patient rather a call back or a response via [facility]? [facility]     Best call back number: [phone]      Additional Information:

## 2024-03-19 NOTE — TELEPHONE ENCOUNTER
Spoke with patient. Patient scheduled for 05/15/24 with provider. Patient requesting refill on losartan, atorvastatin, and citalopram.

## 2024-05-03 ENCOUNTER — TELEPHONE (OUTPATIENT)
Dept: FAMILY MEDICINE | Facility: CLINIC | Age: 74
End: 2024-05-03
Payer: MEDICARE

## 2024-05-03 DIAGNOSIS — I10 ESSENTIAL HYPERTENSION: Primary | ICD-10-CM

## 2024-05-03 DIAGNOSIS — Z12.5 PROSTATE CANCER SCREENING: ICD-10-CM

## 2024-05-03 DIAGNOSIS — R73.03 PREDIABETES: ICD-10-CM

## 2024-05-06 NOTE — TELEPHONE ENCOUNTER
Spoke with patient. Patient scheduled for 05/14/24. Patient states he is currently in [place] and will not be back until Monday.

## 2024-05-14 ENCOUNTER — LAB VISIT (OUTPATIENT)
Dept: LAB | Facility: HOSPITAL | Age: 74
End: 2024-05-14
Attending: INTERNAL MEDICINE
Payer: MEDICARE

## 2024-05-14 DIAGNOSIS — I10 ESSENTIAL HYPERTENSION: ICD-10-CM

## 2024-05-14 DIAGNOSIS — Z12.5 PROSTATE CANCER SCREENING: ICD-10-CM

## 2024-05-14 DIAGNOSIS — R73.03 PREDIABETES: ICD-10-CM

## 2024-05-14 LAB
ALBUMIN SERPL BCP-MCNC: 3.6 G/DL (ref 3.5–5.2)
ALP SERPL-CCNC: 60 U/L (ref 55–135)
ALT SERPL W/O P-5'-P-CCNC: 28 U/L (ref 10–44)
ANION GAP SERPL CALC-SCNC: 12 MMOL/L (ref 8–16)
AST SERPL-CCNC: 21 U/L (ref 10–40)
BASOPHILS # BLD AUTO: 0.05 K/UL (ref 0–0.2)
BASOPHILS NFR BLD: 0.9 % (ref 0–1.9)
BILIRUB SERPL-MCNC: 0.5 MG/DL (ref 0.1–1)
BUN SERPL-MCNC: 18 MG/DL (ref 8–23)
CALCIUM SERPL-MCNC: 9.6 MG/DL (ref 8.7–10.5)
CHLORIDE SERPL-SCNC: 104 MMOL/L (ref 95–110)
CHOLEST SERPL-MCNC: 148 MG/DL (ref 120–199)
CHOLEST/HDLC SERPL: 3.4 {RATIO} (ref 2–5)
CO2 SERPL-SCNC: 24 MMOL/L (ref 23–29)
COMPLEXED PSA SERPL-MCNC: 2.5 NG/ML (ref 0–4)
CREAT SERPL-MCNC: 1.1 MG/DL (ref 0.5–1.4)
DIFFERENTIAL METHOD BLD: ABNORMAL
EOSINOPHIL # BLD AUTO: 0.3 K/UL (ref 0–0.5)
EOSINOPHIL NFR BLD: 4.7 % (ref 0–8)
ERYTHROCYTE [DISTWIDTH] IN BLOOD BY AUTOMATED COUNT: 15 % (ref 11.5–14.5)
EST. GFR  (NO RACE VARIABLE): >60 ML/MIN/1.73 M^2
ESTIMATED AVG GLUCOSE: 114 MG/DL (ref 68–131)
GLUCOSE SERPL-MCNC: 85 MG/DL (ref 70–110)
HBA1C MFR BLD: 5.6 % (ref 4–5.6)
HCT VFR BLD AUTO: 41.8 % (ref 40–54)
HDLC SERPL-MCNC: 44 MG/DL (ref 40–75)
HDLC SERPL: 29.7 % (ref 20–50)
HGB BLD-MCNC: 13.7 G/DL (ref 14–18)
IMM GRANULOCYTES # BLD AUTO: 0.02 K/UL (ref 0–0.04)
IMM GRANULOCYTES NFR BLD AUTO: 0.3 % (ref 0–0.5)
LDLC SERPL CALC-MCNC: 91 MG/DL (ref 63–159)
LYMPHOCYTES # BLD AUTO: 1.7 K/UL (ref 1–4.8)
LYMPHOCYTES NFR BLD: 29.5 % (ref 18–48)
MCH RBC QN AUTO: 30.3 PG (ref 27–31)
MCHC RBC AUTO-ENTMCNC: 32.8 G/DL (ref 32–36)
MCV RBC AUTO: 93 FL (ref 82–98)
MONOCYTES # BLD AUTO: 0.8 K/UL (ref 0.3–1)
MONOCYTES NFR BLD: 13.5 % (ref 4–15)
NEUTROPHILS # BLD AUTO: 3 K/UL (ref 1.8–7.7)
NEUTROPHILS NFR BLD: 51.1 % (ref 38–73)
NONHDLC SERPL-MCNC: 104 MG/DL
NRBC BLD-RTO: 0 /100 WBC
PLATELET # BLD AUTO: 250 K/UL (ref 150–450)
PMV BLD AUTO: 8.7 FL (ref 9.2–12.9)
POTASSIUM SERPL-SCNC: 4.2 MMOL/L (ref 3.5–5.1)
PROT SERPL-MCNC: 7.1 G/DL (ref 6–8.4)
RBC # BLD AUTO: 4.52 M/UL (ref 4.6–6.2)
SODIUM SERPL-SCNC: 140 MMOL/L (ref 136–145)
TRIGL SERPL-MCNC: 65 MG/DL (ref 30–150)
WBC # BLD AUTO: 5.79 K/UL (ref 3.9–12.7)

## 2024-05-14 PROCEDURE — 84153 ASSAY OF PSA TOTAL: CPT | Mod: HCNC | Performed by: INTERNAL MEDICINE

## 2024-05-14 PROCEDURE — 36415 COLL VENOUS BLD VENIPUNCTURE: CPT | Mod: HCNC,PO | Performed by: INTERNAL MEDICINE

## 2024-05-14 PROCEDURE — 80061 LIPID PANEL: CPT | Mod: HCNC | Performed by: INTERNAL MEDICINE

## 2024-05-14 PROCEDURE — 85025 COMPLETE CBC W/AUTO DIFF WBC: CPT | Mod: HCNC | Performed by: INTERNAL MEDICINE

## 2024-05-14 PROCEDURE — 80053 COMPREHEN METABOLIC PANEL: CPT | Mod: HCNC | Performed by: INTERNAL MEDICINE

## 2024-05-14 PROCEDURE — 83036 HEMOGLOBIN GLYCOSYLATED A1C: CPT | Mod: HCNC | Performed by: INTERNAL MEDICINE

## 2024-05-15 ENCOUNTER — OFFICE VISIT (OUTPATIENT)
Dept: FAMILY MEDICINE | Facility: CLINIC | Age: 74
End: 2024-05-15
Payer: MEDICARE

## 2024-05-15 VITALS
HEART RATE: 95 BPM | OXYGEN SATURATION: 95 % | BODY MASS INDEX: 28.5 KG/M2 | TEMPERATURE: 99 F | SYSTOLIC BLOOD PRESSURE: 134 MMHG | WEIGHT: 215.06 LBS | HEIGHT: 73 IN | DIASTOLIC BLOOD PRESSURE: 86 MMHG

## 2024-05-15 DIAGNOSIS — M85.89 OTHER SPECIFIED DISORDERS OF BONE DENSITY AND STRUCTURE, MULTIPLE SITES: ICD-10-CM

## 2024-05-15 DIAGNOSIS — I73.9 ASYMPTOMATIC PVD (PERIPHERAL VASCULAR DISEASE): ICD-10-CM

## 2024-05-15 DIAGNOSIS — E55.9 VITAMIN D DEFICIENCY: ICD-10-CM

## 2024-05-15 DIAGNOSIS — J43.9 MILD EMPHYSEMA: ICD-10-CM

## 2024-05-15 DIAGNOSIS — R05.3 CHRONIC COUGH: ICD-10-CM

## 2024-05-15 DIAGNOSIS — R73.03 PREDIABETES: ICD-10-CM

## 2024-05-15 DIAGNOSIS — F41.9 MILD ANXIETY: ICD-10-CM

## 2024-05-15 DIAGNOSIS — M46.07 SPINAL ENTHESOPATHY, LUMBOSACRAL REGION: ICD-10-CM

## 2024-05-15 DIAGNOSIS — K21.9 GASTROESOPHAGEAL REFLUX DISEASE WITHOUT ESOPHAGITIS: ICD-10-CM

## 2024-05-15 DIAGNOSIS — I70.0 AORTIC ARCH ATHEROSCLEROSIS: ICD-10-CM

## 2024-05-15 DIAGNOSIS — M85.859 OSTEOPENIA OF NECK OF FEMUR, UNSPECIFIED LATERALITY: ICD-10-CM

## 2024-05-15 DIAGNOSIS — Z00.00 ROUTINE MEDICAL EXAM: Primary | ICD-10-CM

## 2024-05-15 DIAGNOSIS — R79.89 ELEVATED LFTS: ICD-10-CM

## 2024-05-15 DIAGNOSIS — I10 ESSENTIAL HYPERTENSION: ICD-10-CM

## 2024-05-15 DIAGNOSIS — E66.3 OVERWEIGHT (BMI 25.0-29.9): ICD-10-CM

## 2024-05-15 DIAGNOSIS — Z12.11 ENCOUNTER FOR COLORECTAL CANCER SCREENING: ICD-10-CM

## 2024-05-15 DIAGNOSIS — Z87.891 HX OF TOBACCO USE, PRESENTING HAZARDS TO HEALTH: ICD-10-CM

## 2024-05-15 DIAGNOSIS — R91.8 PULMONARY NODULES/LESIONS, MULTIPLE: ICD-10-CM

## 2024-05-15 DIAGNOSIS — J98.6 ELEVATED HEMIDIAPHRAGM: ICD-10-CM

## 2024-05-15 DIAGNOSIS — Z12.12 ENCOUNTER FOR COLORECTAL CANCER SCREENING: ICD-10-CM

## 2024-05-15 PROCEDURE — 3044F HG A1C LEVEL LT 7.0%: CPT | Mod: CPTII,S$GLB,, | Performed by: INTERNAL MEDICINE

## 2024-05-15 PROCEDURE — 99397 PER PM REEVAL EST PAT 65+ YR: CPT | Mod: S$GLB,,, | Performed by: INTERNAL MEDICINE

## 2024-05-15 PROCEDURE — 1160F RVW MEDS BY RX/DR IN RCRD: CPT | Mod: CPTII,S$GLB,, | Performed by: INTERNAL MEDICINE

## 2024-05-15 PROCEDURE — 3008F BODY MASS INDEX DOCD: CPT | Mod: CPTII,S$GLB,, | Performed by: INTERNAL MEDICINE

## 2024-05-15 PROCEDURE — 3288F FALL RISK ASSESSMENT DOCD: CPT | Mod: CPTII,S$GLB,, | Performed by: INTERNAL MEDICINE

## 2024-05-15 PROCEDURE — 3075F SYST BP GE 130 - 139MM HG: CPT | Mod: CPTII,S$GLB,, | Performed by: INTERNAL MEDICINE

## 2024-05-15 PROCEDURE — 1159F MED LIST DOCD IN RCRD: CPT | Mod: CPTII,S$GLB,, | Performed by: INTERNAL MEDICINE

## 2024-05-15 PROCEDURE — 3079F DIAST BP 80-89 MM HG: CPT | Mod: CPTII,S$GLB,, | Performed by: INTERNAL MEDICINE

## 2024-05-15 PROCEDURE — 99999 PR PBB SHADOW E&M-EST. PATIENT-LVL V: CPT | Mod: PBBFAC,,, | Performed by: INTERNAL MEDICINE

## 2024-05-15 PROCEDURE — 1125F AMNT PAIN NOTED PAIN PRSNT: CPT | Mod: CPTII,S$GLB,, | Performed by: INTERNAL MEDICINE

## 2024-05-15 PROCEDURE — 4010F ACE/ARB THERAPY RXD/TAKEN: CPT | Mod: CPTII,S$GLB,, | Performed by: INTERNAL MEDICINE

## 2024-05-15 PROCEDURE — 1101F PT FALLS ASSESS-DOCD LE1/YR: CPT | Mod: CPTII,S$GLB,, | Performed by: INTERNAL MEDICINE

## 2024-05-15 NOTE — PROGRESS NOTES
CHIEF COMPLAINT:   Chief Complaint   Patient presents with    Annual Exam          HISTORY OF PRESENT ILLNESS:  Vinnie Scott is a 74 y.o. male who presents to the clinic today for a routine medical physical exam. His last physical exam was approximately 1 years(s) ago.    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;  Service: Endoscopy;  Laterality: N/A;    CYSTOGRAM N/A 10/15/2021    Procedure: CYSTOGRAM;  Surgeon: Eligio Culp Jr., MD;  Location: [address];  Service: Urology;  Laterality: N/A;    CYSTOSCOPY N/A 10/15/2021    Procedure: CYSTOSCOPY;  Surgeon: Eligio Culp Jr., MD;  Location: [address];  Service: Urology;  Laterality: N/A;    DILATION OF URETHRA N/A 10/15/2021    Procedure: DILATION, URETHRA;  Surgeon: Eligio Culp Jr., MD;  Location: [address];  Service: Urology;  Laterality: N/A;    FRACTURE SURGERY      Left collarbone    left ankle fracture repair Left 12/2018    TONSILLECTOMY      urinary stricture clearing      VASECTOMY         SOCIAL HISTORY:  Social History     Socioeconomic History    Marital status:     Number of children: 2   Occupational History    Occupation: 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[employer]      Employer: [employer]   Tobacco Use    Smoking status: Former     Current packs/day: 0.00     Average packs/day: 1 pack/day for 45.0 years (45.0 ttl pk-yrs)     Types: Cigarettes     Start date: 1966     Quit date: 2011     Years since quittin.6    Smokeless tobacco: Never   Substance and Sexual Activity    Alcohol use: Yes     Comment: 4-5 beers daily    Drug use: No     Social Determinants of Health     Financial Resource Strain: Low Risk  (2024)    Overall Financial Resource Strain (CARDIA)     Difficulty of Paying Living Expenses: Not hard at all   Food Insecurity: No Food Insecurity (2024)    Hunger Vital Sign     Worried About Running Out of Food in the Last Year: Never true     Ran Out of Food in the Last Year: Never true   Transportation Needs: No Transportation Needs (2024)    PRAPARE - Transportation     Lack of Transportation (Medical): No     Lack of Transportation (Non-Medical): No   Physical Activity: Insufficiently Active (2024)    Exercise Vital Sign     Days of Exercise per Week: 2 days     Minutes of Exercise per Session: 10 min   Stress: Stress Concern Present (2024)    [employer] [place] [employer] - Occupational Stress Questionnaire     Feeling of Stress : To some extent   Housing Stability: Low Risk  (2023)    Housing Stability Vital Sign     Unable to Pay for Housing in the Last Year: No     Number of Places Lived in the Last Year: 1     Unstable Housing in the Last Year: No       FAMILY HISTORY:  Family History   Problem Relation Name Age of Onset    Lung cancer Father      Diabetes Father      Uterine cancer Mother      Heart disease Mother      Drug abuse Brother Reagan     Alcohol abuse Brother Reagan     Prostate cancer Neg Hx      Colon cancer Neg Hx         ALLERGIES AND MEDICATIONS: updated and reviewed.  Review of patient's allergies indicates:   Allergen Reactions    Ciprofloxacin      Other reaction(s): Muscle  pain    Lisinopril Other (See Comments)     cough     Medication List with Changes/Refills   Current Medications    ALBUTEROL (PROVENTIL/VENTOLIN HFA) 90 MCG/ACTUATION INHALER    Inhale 2 puffs into the lungs every 6 (six) hours as needed for Wheezing or Shortness of Breath. Rescue    ASPIRIN (ECOTRIN) 81 MG EC TABLET    Take 1 tablet (81 mg total) by mouth once daily.    ATORVASTATIN (LIPITOR) 10 MG TABLET    Take 1 tablet (10 mg total) by mouth once daily.    AZELASTINE (ASTELIN) 137 MCG (0.1 %) NASAL SPRAY    1 spray (137 mcg total) by Nasal route 2 (two) times daily.    CITALOPRAM (CELEXA) 20 MG TABLET    Take 1 tablet (20 mg total) by mouth once daily.    FEXOFENADINE (ALLEGRA) 180 MG TABLET    Take 1 tablet (180 mg total) by mouth once daily.    FLUTICASONE PROPIONATE (FLONASE) 50 MCG/ACTUATION NASAL SPRAY    1 spray (50 mcg total) by Each Nostril route once daily.    FLUTICASONE-SALMETEROL DISKUS INHALER 250-50 MCG    Inhale 1 puff into the lungs 2 (two) times daily.    LOSARTAN (COZAAR) 100 MG TABLET    Take 1 tablet (100 mg total) by mouth once daily.    OXYBUTYNIN (DITROPAN) 5 MG TAB    Take 1 tablet (5 mg total) by mouth 3 (three) times daily as needed (bladder spasms).    PANTOPRAZOLE (PROTONIX) 40 MG TABLET             CARE TEAM:  Patient Care Team:  Janine Acuña MD as PCP - General (Internal Medicine)  Eligio Culp Jr., MD as Consulting Physician (Urology)  Carmelita Moreno LPN as Care Coordinator  Tacho Carroll MD as Consulting Physician (Gastroenterology)  Rama Chowdhury as Digital Medicine Health   Kerry Robison as Hypertension Digital Medicine Clinician  Janine Acuña MD as Hypertension Digital Medicine Responsible Provider (Internal Medicine)  Medicare, [employer] Managed as Hypertension Digital Medicine Contract         SCREENING HISTORY:  Health Maintenance         Date Due Completion Date    RSV Vaccine (Age 60+ and Pregnant patients) (1 - 1-dose 60+ series) Never done  "---    Colorectal Cancer Screening 08/20/2023 8/20/2020    COVID-19 Vaccine (4 - 2023-24 season) 09/01/2023 2/9/2022    LDCT Lung Screen 05/16/2024 5/16/2023    TETANUS VACCINE 04/13/2025 4/13/2015    PROSTATE-SPECIFIC ANTIGEN 05/14/2025 5/14/2024    Lipid Panel 05/14/2025 5/14/2024    Hemoglobin A1c 05/14/2025 5/14/2024    High Dose Statin 05/15/2025 5/15/2024              REVIEW OF SYSTEMS:  The patient reports : fair dietary habits.  The patient reports  : that they do not exercise regularly, but stay active.  Review of Systems   Constitutional:  Negative for chills and fever.   HENT:  Positive for congestion.    Respiratory:  Positive for cough (chronic - likely sinus related). Negative for shortness of breath.    Cardiovascular:  Negative for chest pain and leg swelling.   Gastrointestinal:  Negative for abdominal pain and diarrhea.   Genitourinary:  Negative for dysuria.   Musculoskeletal:  Positive for arthralgias and back pain.     ROS : patient denies: difficulty initiating urination          Objective    PHYSICAL EXAMINATION/VITALS:  Vitals:    05/15/24 1413   BP: 134/86   Pulse: 95   Temp: 98.5 °F (36.9 °C)   TempSrc: Oral   SpO2: 95%   Weight: 97.6 kg (215 lb 0.9 oz)   Height: 6' 1" (1.854 m)       Body mass index is 28.37 kg/m².    General appearance - alert, well appearing, and in no distress, overweight  Psychiatric - alert, oriented to person, place, and time, normal behavior, speech, dress, motor activity and thought process  Eyes - pupils equal and reactive, extraocular eye movements intact, sclera anicteric  Neck - supple, no significant adenopathy, carotids upstroke normal bilaterally, no bruits  Lymphatics - no palpable cervical lymphadenopathy  Chest - clear to auscultation, no wheezes, rales or rhonchi, symmetric air entry  Heart - normal rate and regular rhythm  Neurological - alert, normal speech, no focal findings; cranial nerves II through XII intact  Musculoskeletal - patient noted to " have Moderate osteoarthritic changes to both knee joints. No joint effusions noted.  Extremities - no pedal edema noted  Skin - normal coloration, no suspicious skin lesions      LABS:  Results for orders placed or performed in visit on 05/14/24   CBC Auto Differential   Result Value Ref Range    WBC 5.79 3.90 - 12.70 K/uL    RBC 4.52 (L) 4.60 - 6.20 M/uL    Hemoglobin 13.7 (L) 14.0 - 18.0 g/dL    Hematocrit 41.8 40.0 - 54.0 %    MCV 93 82 - 98 fL    MCH 30.3 27.0 - 31.0 pg    MCHC 32.8 32.0 - 36.0 g/dL    RDW 15.0 (H) 11.5 - 14.5 %    Platelets 250 150 - 450 K/uL    MPV 8.7 (L) 9.2 - 12.9 fL    Immature Granulocytes 0.3 0.0 - 0.5 %    Gran # (ANC) 3.0 1.8 - 7.7 K/uL    Immature Grans (Abs) 0.02 0.00 - 0.04 K/uL    Lymph # 1.7 1.0 - 4.8 K/uL    Mono # 0.8 0.3 - 1.0 K/uL    Eos # 0.3 0.0 - 0.5 K/uL    Baso # 0.05 0.00 - 0.20 K/uL    nRBC 0 0 /100 WBC    Gran % 51.1 38.0 - 73.0 %    Lymph % 29.5 18.0 - 48.0 %    Mono % 13.5 4.0 - 15.0 %    Eosinophil % 4.7 0.0 - 8.0 %    Basophil % 0.9 0.0 - 1.9 %    Differential Method Automated    Comprehensive Metabolic Panel   Result Value Ref Range    Sodium 140 136 - 145 mmol/L    Potassium 4.2 3.5 - 5.1 mmol/L    Chloride 104 95 - 110 mmol/L    CO2 24 23 - 29 mmol/L    Glucose 85 70 - 110 mg/dL    BUN 18 8 - 23 mg/dL    Creatinine 1.1 0.5 - 1.4 mg/dL    Calcium 9.6 8.7 - 10.5 mg/dL    Total Protein 7.1 6.0 - 8.4 g/dL    Albumin 3.6 3.5 - 5.2 g/dL    Total Bilirubin 0.5 0.1 - 1.0 mg/dL    Alkaline Phosphatase 60 55 - 135 U/L    AST 21 10 - 40 U/L    ALT 28 10 - 44 U/L    eGFR >60.0 >60 mL/min/1.73 m^2    Anion Gap 12 8 - 16 mmol/L   Lipid Panel   Result Value Ref Range    Cholesterol 148 120 - 199 mg/dL    Triglycerides 65 30 - 150 mg/dL    HDL 44 40 - 75 mg/dL    LDL Cholesterol 91.0 63.0 - 159.0 mg/dL    HDL/Cholesterol Ratio 29.7 20.0 - 50.0 %    Total Cholesterol/HDL Ratio 3.4 2.0 - 5.0    Non-HDL Cholesterol 104 mg/dL   Hemoglobin A1C   Result Value Ref Range    Hemoglobin  A1C 5.6 4.0 - 5.6 %    Estimated Avg Glucose 114 68 - 131 mg/dL   PSA, Screening   Result Value Ref Range    PSA, Screen 2.5 0.00 - 4.00 ng/mL              ASSESSMENT AND PLAN:   1. Routine medical exam  Counseled on age appropriate medical preventative services including age appropriate cancer screenings, age appropriate eye and dental exams, over all nutritional health, need for a consistent exercise regimen, and an over all push towards maintaining a vigorous and active lifestyle.  Counseled on age appropriate vaccines and discussed upcoming health care needs based on age/gender. Discussed good sleep hygiene and stress management.    2. Essential hypertension  BP Readings from Last 1 Encounters:   05/15/24 134/86      The current medical regimen is effective;  continue present plan and medications. Recommended patient to check home readings to monitor and see me for followup as scheduled or sooner as needed.   Discussed sodium restriction, maintaining ideal body weight and regular exercise program as physiologic means to continue to achieve blood pressure control in addition to medication compliance.  Patient was educated that both decongestant and anti-inflammatory medication may raise blood pressure.  The patient is active on the digital hypertension program.    3. Asymptomatic PVD (peripheral vascular disease)  Stable. Asymptomatic. Observe.  Overview:  - extensive calcifications noted in peripheral vessels on ankle X-ray 12/2018      4. Aortic arch atherosclerosis  Patient with Atherosclerosis of the Aorta.  Stable/asymptomatic. Currently stable on lipid lowering medication and blood pressure monitoring.  Overview:  - noted on CXR 12/2018      5. Prediabetes  Lab Results   Component Value Date    HGBA1C 5.6 05/14/2024     Stable. We discussed low sugar/low carbohydrate diet and regular exercise to prevent progression. No need for prescription medication at this time.  Check A1c yearly.    6. Gastroesophageal  reflux disease without esophagitis  Symptoms controlled: yes - takes medication occasionally as needed.   Reflux precautions discussed (eliminate tobacco if a smoker; minimize caffeine, chocolate and red/white peppermint intake; avoid heavy and spicy meals; don't lay down within 2-3 hours after eating; minimize the intake of NSAIDs). Medication as needed.   Patient asked to take medication breaks, if possible - discussed chronic use can limit calcium absorption (which can lead to osteopenia/osteoporosis), increases the risk for intestinal infections, and can cause kidney damage. There are also some newer studies that show possible increased risk of mortality.    7. Elevated LFTs  Currently wnl. Observe.  Overview:  - normalized when stopped drinking  - evaluated by [facility] - SHIV/NUÑEZ likely; NAFLD score high. F0 on US/elastography  - immune to hep A  - being vaccinated for hep B according to GI (?records)  - has some fatty liver noted on CT scan 5/2023      8. Mild anxiety  The current medical regimen is effective;  continue present plan and medications.     9. Elevated hemidiaphragm  Stable. Observe.  Overview:  - chronic; right sided      10. Chronic cough  The current medical regimen is effective;  continue present plan and medications.   Followed by: Pulmonology.   Overview:  etiology unclear.  Ct sinus with patch mucosal thickening.  Optimize nasal congestion.  F/u with .  pft without obstruction with decreased dlco.  Will start advair since cough improve with albuterol.    Repeat pft and echo due to decreased dlco      11. Pulmonary nodules/lesions, multiple  Due for surveillance follow up - CT ordered.  Overview:  CT chest 7/14/2020: stable nodules compare to 1/14/2020 likely benign, recommended annual LDCT:  5/2023 - CT scan shows stable nodules      12. Mild emphysema  Stable. Observe. Continue inhaler therapy. Followed by pulmonology.  Overview:  - noted on CT scan of lungs 5/2023      13.  Overweight (BMI 25.0-29.9)  BMI Readings from Last 3 Encounters:   05/15/24 28.37 kg/m²   10/19/23 27.18 kg/m²   07/21/23 27.27 kg/m²     The patient is asked to make an attempt to improve diet and exercise patterns to aid in medical management of this problem.    14. Encounter for colorectal cancer screening    -     Ambulatory referral/consult to Endo Procedure ; Future; Expected date: 05/16/2024    15. Osteopenia of neck of femur, unspecified laterality/17. Vitamin D deficiency  We discussed adequate calcium intake (preferably from diet) and vitamin D supplementation. We discussed fall precautions. He is scheduled for his BMD. Further treatment plan will be based on results of bone density testing.  Overview:  - 1/2021 - s/p Reclast infusion  - 2/2022 - BMD recommends to continue yearly Reclast infusion  - 5/2023 - s/p Reclast    Orders:  -     DXA Bone Density Axial Skeleton 1 or more sites; Future; Expected date: 05/15/2024    16. Other specified disorders of bone density and structure, multiple sites    -     DXA Bone Density Axial Skeleton 1 or more sites; Future; Expected date: 05/15/2024      18. Spinal enthesopathy, lumbosacral region  Otc pain medication. Encouraged HEP. Followed by ortho.    19. Hx of tobacco use, presenting hazards to health  I reviewed with the patient the U.[place]. Preventative Services Task Force Recommendation on Lung Cancer Screening With Low-Dose Computed Tomography.  Patient meets eligibility criteria as per current CMS guidelines for subsequent LDCT lung cancer screening (age 50-80; asymptomatic; tobacco smoking hx of at least 30 pack years; current smoker or one who has quit smoking within the last 15 years).  Benefits and harms of screening discussed with patient, including follow-up diagnostic testing, over-diagnosis, false positive rate, and total radiation exposure.  Patient counseled on the importance of adherence to annual lung cancer LDCT screening, impact of  comorbidities and ability or willingness to undergo diagnosis and treatment.  Patient counseled on the importance of maintaining cigarette smoking abstinence if former smoker; or the importance of smoking cessation if current smoker and, if appropriate, furnishing of information about tobacco cessation interventions  All questions answered.   Patient wishes to proceed with continued annual surveillance testing.  -     CT Chest Lung Screening Low Dose; Future; Expected date: 05/15/2024           Orders Placed This Encounter   Procedures    DXA Bone Density Axial Skeleton 1 or more sites    CT Chest Lung Screening Low Dose    Ambulatory referral/consult to Endo Procedure        FOLLOW UP: Follow up in about 1 year (around 5/15/2025), or if symptoms worsen or fail to improve, for annual exam. or sooner as needed.

## 2024-05-29 DIAGNOSIS — I73.9 ASYMPTOMATIC PVD (PERIPHERAL VASCULAR DISEASE): ICD-10-CM

## 2024-05-29 DIAGNOSIS — I70.0 AORTIC ARCH ATHEROSCLEROSIS: ICD-10-CM

## 2024-05-29 DIAGNOSIS — F41.9 MILD ANXIETY: ICD-10-CM

## 2024-05-29 DIAGNOSIS — I10 ESSENTIAL HYPERTENSION: ICD-10-CM

## 2024-05-29 RX ORDER — ATORVASTATIN CALCIUM 10 MG/1
10 TABLET, FILM COATED ORAL DAILY
Qty: 90 TABLET | Refills: 3 | Status: SHIPPED | OUTPATIENT
Start: 2024-05-29

## 2024-05-29 RX ORDER — LOSARTAN POTASSIUM 100 MG/1
100 TABLET ORAL DAILY
Qty: 90 TABLET | Refills: 3 | Status: SHIPPED | OUTPATIENT
Start: 2024-05-29

## 2024-05-29 RX ORDER — CITALOPRAM 20 MG/1
20 TABLET, FILM COATED ORAL DAILY
Qty: 90 TABLET | Refills: 3 | Status: SHIPPED | OUTPATIENT
Start: 2024-05-29

## 2024-05-29 NOTE — TELEPHONE ENCOUNTER
Refill Routing Note   Medication(s) are not appropriate for processing by [facility] for the following reason(s):        Outside of protocol    [facility] action(s):  Route               Appointments  past 12m or future 3m with PCP    Date Provider   Last Visit   5/15/2024 Janine Acuña MD   Next Visit   Visit date not found Janine Acuña MD   ED visits in past 90 days: 0        Note composed:3:06 PM 05/29/2024

## 2024-05-29 NOTE — TELEPHONE ENCOUNTER
Refill Decision Note   Vinnie Oramous  is requesting a refill authorization.  Brief Assessment and Rationale for Refill:  Approve     Medication Therapy Plan:         Comments:     Note composed:4:09 PM 05/29/2024

## 2024-05-29 NOTE — TELEPHONE ENCOUNTER
No care due was identified.  [facility] Embedded Care Due Messages. Reference number: [id number].   5/29/2024 2:26:11 PM CDT

## 2024-05-30 RX ORDER — ASPIRIN 81 MG/1
81 TABLET ORAL DAILY
Qty: 90 TABLET | Refills: 0 | Status: SHIPPED | OUTPATIENT
Start: 2024-05-30

## 2024-06-02 ENCOUNTER — HOSPITAL ENCOUNTER (EMERGENCY)
Facility: HOSPITAL | Age: 74
Discharge: HOME | End: 2024-06-02
Attending: EMERGENCY MEDICINE
Payer: MEDICARE

## 2024-06-02 VITALS
SYSTOLIC BLOOD PRESSURE: 106 MMHG | DIASTOLIC BLOOD PRESSURE: 64 MMHG | TEMPERATURE: 99 F | RESPIRATION RATE: 20 BRPM | BODY MASS INDEX: 28.44 KG/M2 | HEART RATE: 89 BPM | WEIGHT: 210 LBS | OXYGEN SATURATION: 95 % | HEIGHT: 72 IN

## 2024-06-02 DIAGNOSIS — N30.01 ACUTE CYSTITIS WITH HEMATURIA: ICD-10-CM

## 2024-06-02 DIAGNOSIS — F10.10 ALCOHOL ABUSE: Primary | ICD-10-CM

## 2024-06-02 DIAGNOSIS — F41.9 ANXIETY: ICD-10-CM

## 2024-06-02 DIAGNOSIS — R00.0 RAPID HEART BEAT: ICD-10-CM

## 2024-06-02 LAB
ALBUMIN SERPL-MCNC: 2.6 G/DL (ref 3.3–5.5)
ALBUMIN SERPL-MCNC: 2.7 G/DL (ref 3.3–5.5)
ALP SERPL-CCNC: 113 U/L (ref 42–141)
ALP SERPL-CCNC: 119 U/L (ref 42–141)
BILIRUB SERPL-MCNC: 0.4 MG/DL (ref 0.2–1.6)
BILIRUB SERPL-MCNC: 0.6 MG/DL (ref 0.2–1.6)
BILIRUBIN, POC UA: NEGATIVE
BLOOD, POC UA: ABNORMAL
BUN SERPL-MCNC: 20 MG/DL (ref 7–22)
CALCIUM SERPL-MCNC: 7.8 MG/DL (ref 8–10.3)
CHLORIDE SERPL-SCNC: 107 MMOL/L (ref 98–108)
CLARITY, POC UA: ABNORMAL
COLOR, POC UA: YELLOW
CREAT SERPL-MCNC: 1.3 MG/DL (ref 0.6–1.2)
ETHANOL SERPL-MCNC: <10 MG/DL
GLUCOSE SERPL-MCNC: 125 MG/DL (ref 73–118)
GLUCOSE, POC UA: NEGATIVE
HCT, POC: NORMAL
HGB, POC: NORMAL (ref 14–18)
KETONES, POC UA: NEGATIVE
LEUKOCYTE EST, POC UA: ABNORMAL
MCH, POC: NORMAL
MCHC, POC: NORMAL
MCV, POC: NORMAL
MPV, POC: NORMAL
NITRITE, POC UA: POSITIVE
PH UR STRIP: 5.5 [PH]
POC ALT (SGPT): 24 U/L (ref 10–47)
POC ALT (SGPT): 55 U/L (ref 10–47)
POC AMYLASE: 43 U/L (ref 14–97)
POC AST (SGOT): 39 U/L (ref 11–38)
POC AST (SGOT): 46 U/L (ref 11–38)
POC CARDIAC TROPONIN I: 0.02 NG/ML (ref 0–0.08)
POC GGT: 212 U/L (ref 5–65)
POC PLATELET COUNT: NORMAL
POC TCO2: 24 MMOL/L (ref 18–33)
POTASSIUM BLD-SCNC: 3.6 MMOL/L (ref 3.6–5.1)
PROTEIN, POC UA: ABNORMAL
PROTEIN, POC: 6.3 G/DL (ref 6.4–8.1)
PROTEIN, POC: 7.3 G/DL (ref 6.4–8.1)
RBC, POC: NORMAL
RDW, POC: NORMAL
SAMPLE: NORMAL
SODIUM BLD-SCNC: 137 MMOL/L (ref 128–145)
SPECIFIC GRAVITY, POC UA: 1.01
UROBILINOGEN, POC UA: 1 E.U./DL
WBC, POC: NORMAL

## 2024-06-02 PROCEDURE — 93010 ELECTROCARDIOGRAM REPORT: CPT | Mod: HCNC,,, | Performed by: INTERNAL MEDICINE

## 2024-06-02 PROCEDURE — 87088 URINE BACTERIA CULTURE: CPT | Mod: HCNC | Performed by: EMERGENCY MEDICINE

## 2024-06-02 PROCEDURE — 80053 COMPREHEN METABOLIC PANEL: CPT | Mod: HCNC,ER

## 2024-06-02 PROCEDURE — 63600175 PHARM REV CODE 636 W HCPCS: Mod: HCNC,ER | Performed by: EMERGENCY MEDICINE

## 2024-06-02 PROCEDURE — 87086 URINE CULTURE/COLONY COUNT: CPT | Mod: HCNC | Performed by: EMERGENCY MEDICINE

## 2024-06-02 PROCEDURE — 96361 HYDRATE IV INFUSION ADD-ON: CPT | Mod: HCNC,ER

## 2024-06-02 PROCEDURE — 87186 SC STD MICRODIL/AGAR DIL: CPT | Mod: HCNC | Performed by: EMERGENCY MEDICINE

## 2024-06-02 PROCEDURE — 99284 EMERGENCY DEPT VISIT MOD MDM: CPT | Mod: 25,HCNC,ER

## 2024-06-02 PROCEDURE — 87077 CULTURE AEROBIC IDENTIFY: CPT | Mod: HCNC | Performed by: EMERGENCY MEDICINE

## 2024-06-02 PROCEDURE — 82077 ASSAY SPEC XCP UR&BREATH IA: CPT | Mod: HCNC | Performed by: EMERGENCY MEDICINE

## 2024-06-02 PROCEDURE — 96365 THER/PROPH/DIAG IV INF INIT: CPT | Mod: HCNC,ER

## 2024-06-02 PROCEDURE — 82040 ASSAY OF SERUM ALBUMIN: CPT | Mod: 59,HCNC,ER

## 2024-06-02 PROCEDURE — 84484 ASSAY OF TROPONIN QUANT: CPT | Mod: HCNC,ER

## 2024-06-02 PROCEDURE — 96375 TX/PRO/DX INJ NEW DRUG ADDON: CPT | Mod: HCNC,ER

## 2024-06-02 PROCEDURE — 93005 ELECTROCARDIOGRAM TRACING: CPT | Mod: HCNC,ER

## 2024-06-02 PROCEDURE — 82150 ASSAY OF AMYLASE: CPT | Mod: HCNC,ER

## 2024-06-02 PROCEDURE — 25000003 PHARM REV CODE 250: Mod: HCNC,ER | Performed by: EMERGENCY MEDICINE

## 2024-06-02 PROCEDURE — 85025 COMPLETE CBC W/AUTO DIFF WBC: CPT | Mod: HCNC,ER

## 2024-06-02 RX ORDER — CHLORDIAZEPOXIDE HYDROCHLORIDE 25 MG/1
CAPSULE, GELATIN COATED ORAL
Qty: 24 CAPSULE | Refills: 0 | Status: SHIPPED | OUTPATIENT
Start: 2024-06-02

## 2024-06-02 RX ORDER — NITROFURANTOIN 25; 75 MG/1; MG/1
100 CAPSULE ORAL 2 TIMES DAILY
Qty: 10 CAPSULE | Refills: 0 | Status: SHIPPED | OUTPATIENT
Start: 2024-06-02 | End: 2024-06-07

## 2024-06-02 RX ADMIN — CEFTRIAXONE 1 G: 1 INJECTION, POWDER, FOR SOLUTION INTRAMUSCULAR; INTRAVENOUS at 05:06

## 2024-06-02 RX ADMIN — SODIUM CHLORIDE 1000 ML: 9 INJECTION, SOLUTION INTRAVENOUS at 04:06

## 2024-06-02 RX ADMIN — LORAZEPAM 2 MG: 2 INJECTION INTRAMUSCULAR; INTRAVENOUS at 05:06

## 2024-06-02 NOTE — ED PROVIDER NOTES
"Encounter Date: 6/2/2024       History     Chief Complaint   Patient presents with    Alcohol Problem     Here for feeling nervous/anxious along w/ nausea. States last alcohol was over 1 week ago and feels he is going through "DTs"     74 y.o. male with ETOH abuse, HTN, GERD, anxiety, and others presents to the ED c/o acute, intermittent anxiety that began 8 days ago.  He  reports heavy ETOH abuse ( drinks at least a 5th of hard liquor daily -  last drink eight days ago).   He reports feeling shaky three days ago that resolved spontaneously after two days.    He reports taking citalopram 20 mg daily for anxiety which she states does not work very well.  He states that he is scared that the symptoms may be related to a heart attack which prompted him  To come to the ED today.  He denies tobacco use.  He denies illicit drug use.  He endorses compliance with antihypertensive medication (losartan 100 mg daily).  He denies history of alcohol withdrawal.  Reports previously going to [facility] the last time he quit.   He endorses two episodes of emesis two days ago and one episode of emesis yesterday.  He denies hematemesis, coffee-ground emesis, melena, abdominal pain, shortness of breath, chest pain, fever, SI, HI, hallucinations, focal weakness, dizziness,  seizure activity or syncope.    The history is provided by the patient.     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    Mild anxiety     Mild emphysema 5/16/2023    " Overweight(278.02)     Personal history of colonic polyps 2010, May 2015    Prediabetes     Urinary anastomotic stricture      Past Surgical History:   Procedure Laterality Date    ADENOIDECTOMY      COLONOSCOPY N/A 2017    Procedure: COLONOSCOPY;  Surgeon: Fatoumata Cook MD;  Location: [facility];  Service: Endoscopy;  Laterality: N/A;    CYSTOGRAM N/A 10/15/2021    Procedure: CYSTOGRAM;  Surgeon: Eligio Culp Jr., MD;  Location: [address];  Service: Urology;  Laterality: N/A;    CYSTOSCOPY N/A 10/15/2021    Procedure: CYSTOSCOPY;  Surgeon: Eligio Culp Jr., MD;  [facility];  Service: Urology;  Laterality: N/A;    DILATION OF URETHRA N/A 10/15/2021    Procedure: DILATION, URETHRA;  Surgeon: Eligio Culp Jr., MD;  Location: [facility] OR [address] [place] [address];  Service: Urology;  Laterality: N/A;    FRACTURE SURGERY      Left collarbone    left ankle fracture repair Left 2018    TONSILLECTOMY      urinary stricture clearing      VASECTOMY       Family History   Problem Relation Name Age of Onset    Lung cancer Father      Diabetes Father      Uterine cancer Mother      Heart disease Mother      Drug abuse Brother Reagan     Alcohol abuse Brother Reagan     Prostate cancer Neg Hx      Colon cancer Neg Hx       Social History     Tobacco Use    Smoking status: Former     Current packs/day: 0.00     Average packs/day: 1 pack/day for 45.0 years (45.0 ttl pk-yrs)     Types: Cigarettes     Start date: 1966     Quit date: 2011     Years since quittin.7    Smokeless tobacco: Never   Substance Use Topics    Alcohol use: Yes     Comment: last drink on approx 24    Drug use: No     Review of Systems   Constitutional:  Positive for fatigue. Negative for appetite change and fever.   Respiratory:  Negative for shortness of breath.    Cardiovascular:  Negative for chest pain.   Gastrointestinal:  Negative for abdominal pain, diarrhea, nausea and vomiting (2 days ago, resolved).    Endocrine: Positive for polydipsia. Negative for polyphagia and polyuria.   Genitourinary:  Negative for dysuria, frequency and hematuria.   Musculoskeletal:  Negative for gait problem.   Neurological:  Positive for tremors (two days ago - now resolved). Negative for seizures, syncope and weakness.   Psychiatric/Behavioral:  Negative for agitation, behavioral problems, confusion and suicidal ideas. The patient is nervous/anxious.        Physical Exam     Initial Vitals [06/02/24 0345]   BP Pulse Resp Temp SpO2   94/61 (!) 145 (!) 22 99.3 °F (37.4 °C) 95 %      MAP       --         Physical Exam    Nursing note and vitals reviewed.  Constitutional: He appears well-developed and well-nourished. He is not diaphoretic. No distress.   HENT:   Head: Normocephalic and atraumatic.   Mouth/Throat: Oropharynx is clear and moist.   Eyes: Conjunctivae are normal.   Neck: Phonation normal. No stridor present.   Normal range of motion.  Cardiovascular:  Regular rhythm and intact distal pulses.           Pulmonary/Chest: Effort normal. No accessory muscle usage or stridor. No tachypnea. No respiratory distress. He has no wheezes. He has no rhonchi. He has no rales. He exhibits no tenderness.   Abdominal: Abdomen is soft. He exhibits no distension. There is no abdominal tenderness. There is no rebound and no guarding.   Musculoskeletal:         General: No tenderness. Normal range of motion.      Cervical back: Normal range of motion.     Neurological: He is alert and oriented to person, place, and time. He has normal strength. He displays no tremor. He displays no seizure activity. Gait normal. GCS score is 15. GCS eye subscore is 4. GCS verbal subscore is 5. GCS motor subscore is 6.   Skin: Skin is warm and intact.   Psychiatric: He has a normal mood and affect.         ED Course   Procedures  Labs Reviewed   CULTURE, URINE - Abnormal; Notable for the following components:       Result Value    Urine Culture, Routine   (*)      Value: ESCHERICHIA COLI  > 100,000 cfu/ml      All other components within normal limits    Narrative:     Indicated criteria for high risk culture:->Other  Other (specify):->UTI   POCT URINALYSIS W/O SCOPE - Abnormal; Notable for the following components:    Blood, UA 1+ (*)     Protein, UA 1+ (*)     Nitrite, UA Positive (*)     Leukocytes, UA 2+ (*)     All other components within normal limits   POCT LIVER PANEL - Abnormal; Notable for the following components:    ALT (SGPT), POC 55 (*)     AST (SGOT), POC 46 (*)     POC  (*)     All other components within normal limits   POCT CMP - Abnormal; Notable for the following components:    AST (SGOT), POC 39 (*)     Calcium, POC 7.8 (*)     POC Creatinine 1.3 (*)     POC Glucose 125 (*)     Protein, POC 6.3 (*)     All other components within normal limits   ALCOHOL,MEDICAL (ETHANOL)   TROPONIN ISTAT   POCT CBC   POCT URINALYSIS W/O SCOPE   POCT CMP   POCT LIVER PANEL   POCT TROPONIN     EKG Readings: (Independently Interpreted)   Initial Reading: No STEMI. Rhythm: Normal Sinus Rhythm. Heart Rate: 132. Ectopy: No Ectopy. Conduction: Normal. ST Segments: Normal ST Segments. T Waves: Normal. Axis: Left Axis Deviation. Clinical Impression: Sinus Tachycardia     ECG Results              EKG 12-lead (Rapid Heart Beat / Palpitations) Age > 50 (Final result)        Collection Time Result Time QRS Duration OHS QTC Calculation    06/02/24 03:52:06 06/05/24 23:50:17 80 441                     Final result by Interface, Lab In [facility] (06/05/24 23:50:21)                   Narrative:    Test Reason : R00.0,    Vent. Rate : 132 BPM     Atrial Rate : 132 BPM     P-R Int : 136 ms          QRS Dur : 080 ms      QT Int : 298 ms       P-R-T Axes : 056 -21 016 degrees     QTc Int : 441 ms    Sinus tachycardia  Otherwise normal ECG  When compared with ECG of 25-AUG-2020 18:23,  Vent. rate has increased BY  47 BPM  Confirmed by Monica NANCE, Qiana REYES ([id number]) on 6/5/2024 11:50:11  PM    Referred By: [email or web address]   SELF           Confirmed By:Qiana Anderson MD                                  Imaging Results    None          Medications   sodium chloride 0.9% bolus 1,000 mL 1,000 mL (0 mLs Intravenous Stopped 6/2/24 0506)   cefTRIAXone (Rocephin) 1 g in dextrose 5 % in water (D5W) 100 mL IVPB (MB+) (0 g Intravenous Stopped 6/2/24 0554)   LORazepam (ATIVAN) injection 2 mg (2 mg Intravenous Given 6/2/24 0521)     Medical Decision Making  Amount and/or Complexity of Data Reviewed  Labs: ordered. Decision-making details documented in ED Course.    Risk  Prescription drug management.      Additional MDM:   Heart Score:    History:          Slightly suspicious.  ECG:             Normal  Age:               >65 years  Risk factors: 1-2 risk factors  Troponin:       Less than or equal to normal limit  Heart Score = 3                ED Course as of 06/07/24 2204   Sun Jun 02, 2024   0541 Ethanol [DL]   0542 12/11/20 11:01  Creatinine: 1.0    12/13/21 11:09  Creatinine: 1.0    04/21/23 10:12  Creatinine: 1.4    05/14/24 07:15  Creatinine: 1.1 [DL]      ED Course User Index  [DL] King Hassan MD          Labs Reviewed  Pertinent lab findings include:    White count 12.3, H&H within normal limit, urinalysis with 1+ hematuria, 1+ proteinuria, positive nitrites, 2+ leukocyte esterase,  CMP: Albumin 2.6, calcium 7.8, corrected calcium for hypoalbuminemia (8.9), Cr 1.5,  troponin 0.02, serum alcohol less than 10, ALT 55, AST 46, , amylase 43, urine culture pending at time of ED disposition    Admission on 06/02/2024, Discharged on 06/02/2024   Component Date Value Ref Range Status    QRS Duration 06/02/2024 80  ms Final    OHS QTC Calculation 06/02/2024 441  ms Final    Albumin, POC 06/02/2024 2.7  3.3 - 5.5 g/dL Final    Alkaline Phosphatase, POC 06/02/2024 119  42 - 141 U/L Final    ALT (SGPT), POC 06/02/2024 55 (H)  10 - 47 U/L Final    Amylase, POC 06/02/2024 43  14 - 97 U/L Final    AST (SGOT), POC  06/02/2024 46 (H)  11 - 38 U/L Final    POC GGT 06/02/2024 212 (H)  5 - 65 U/L Final    Bilirubin, POC 06/02/2024 0.6  0.2 - 1.6 mg/dL Final    Protein, POC 06/02/2024 7.3  6.4 - 8.1 g/dL Final    Glucose, UA 06/02/2024 Negative   Final    Bilirubin, UA 06/02/2024 Negative   Final    Ketones, UA 06/02/2024 Negative   Final    Spec Grav UA 06/02/2024 1.010   Final    Blood, UA 06/02/2024 1+ (A)   Final    PH, UA 06/02/2024 5.5   Final    Protein, UA 06/02/2024 1+ (A)   Final    Urobilinogen, UA 06/02/2024 1.0  E.U./dL Final    Nitrite, UA 06/02/2024 Positive (P)   Final    Leukocytes, UA 06/02/2024 2+ (A)   Final    Color, UA 06/02/2024 Yellow   Final    Clarity, UA 06/02/2024 Cloudy   Final    Urine Culture, Routine 06/02/2024  (A)   Final                    Value:ESCHERICHIA COLI  > 100,000 cfu/ml      Albumin, POC 06/02/2024 2.6  3.3 - 5.5 g/dL Final    Alkaline Phosphatase, POC 06/02/2024 113  42 - 141 U/L Final    ALT (SGPT), POC 06/02/2024 24  10 - 47 U/L Final    AST (SGOT), POC 06/02/2024 39 (H)  11 - 38 U/L Final    POC BUN 06/02/2024 20  7 - 22 mg/dL Final    Calcium, POC 06/02/2024 7.8 (L)  8.0 - 10.3 mg/dL Final    POC Chloride 06/02/2024 107  98 - 108 mmol/L Final    POC Creatinine 06/02/2024 1.3 (H)  0.6 - 1.2 mg/dL Final    POC Glucose 06/02/2024 125 (H)  73 - 118 mg/dL Final    POC Potassium 06/02/2024 3.6  3.6 - 5.1 mmol/L Final    POC Sodium 06/02/2024 137  128 - 145 mmol/L Final    Bilirubin, POC 06/02/2024 0.4  0.2 - 1.6 mg/dL Final    POC TCO2 06/02/2024 24  18 - 33 mmol/L Final    Protein, POC 06/02/2024 6.3 (L)  6.4 - 8.1 g/dL Final    Alcohol, Serum 06/02/2024 <10  <10 mg/dL Final    POC Cardiac Troponin I 06/02/2024 0.02  0.00 - 0.08 ng/mL Final    Sample 06/02/2024 unknown   Final    Comment: A single negative troponin is insufficient to rule out myocardial infarction.  The use of a serial sampling protocol is recommended practice. Correlate results with reference intervals established for  methodology used. Point of care and core laboratory   troponin results are not interchangeable.          Imaging Reviewed    Imaging Results    None         Medications given in ED    Medications   sodium chloride 0.9% bolus 1,000 mL 1,000 mL (0 mLs Intravenous Stopped 6/2/24 0506)   cefTRIAXone (Rocephin) 1 g in dextrose 5 % in water (D5W) 100 mL IVPB (MB+) (0 g Intravenous Stopped 6/2/24 0554)   LORazepam (ATIVAN) injection 2 mg (2 mg Intravenous Given 6/2/24 0521)       Note was created using voice recognition software. Note may have occasional typographical errors that may not have been identified and edited despite good luis antonio initial review prior to signing.       Medical Decision Making:   Clinical Tests:   Lab Tests: Ordered and Reviewed  Medical Tests: Ordered and Reviewed  ED Management:  This patient presents with symptoms consistent with acute anxiety reaction / panic attack. Patient endorses alcohol abuse and states he quit drinking one week ago.  Patients vitals, mentation and exam does not raise suspicion for acute alcohol withdrawal or DT's. Patient just appears anxious.  He expresses a recent renewed desire to quit drinking alcohol for his family and because he was scared that he was having a heart attack.  ED work up rules out ACS. Low suspicion for PE, or thoracic aortic dissection. Denies any ingestions or any other medical complaints Given history and physical presentation not consistent with overt toxidrome, ingestion. Presentation not consistent with a medical emergency at this time. No acute indication for psychiatric consultation (without SI/HI, AH/VH).  Test results, outpatient management plan, outpatient PCP follow up and ED return precautions discussed with patient with understanding and agreement.            Vitals:    06/02/24 0400 06/02/24 0506 06/02/24 0621 06/02/24 0705   BP: 114/69  109/60 106/64   BP Location:       Patient Position:       Pulse: (!) 125 100 100 89   Resp: 20 20 20  20   Temp:       TempSrc:       SpO2: 95% 95% 95% 95%   Weight:       Height:             Clinical Impression:  Final diagnoses:  [R00.0] Rapid heart beat  [F10.10] Alcohol abuse (Primary)  [F41.9] Anxiety  [N30.01] Acute cystitis with hematuria          ED Disposition Condition    Discharge Stable          ED Prescriptions       Medication Sig Dispense Start Date End Date Auth. Provider    nitrofurantoin, macrocrystal-monohydrate, (MACROBID) 100 MG capsule (Expires today) Take 1 capsule (100 mg total) by mouth 2 (two) times daily. for 5 days 10 capsule 6/2/2024 6/7/2024 King Hassan MD    chlordiazepoxide (LIBRIUM) 25 MG Cap Day1 take 50 mg every 6 hours as needed based on symptoms, Day 2 take 50 mg every 8 hours as needed Day 3 take 50 mg every 12 hours as needed, Day 4 take 50 mg at bedtime as needed 24 capsule 6/2/2024 -- King Hassan MD          Follow-up Information       Follow up With Specialties Details Why Contact Info    The nearest emergency department.  Go to  As needed, If symptoms worsen     Janine Acuña MD Internal Medicine, Pediatrics Call  Monday morning, to schedule an appointment, for re-evaluation of today's complaint, and ongoing care [address] [place] [address]  Daisy GARN[place] [id number]  [phone]               King Hassan MD  06/07/24 5014

## 2024-06-03 ENCOUNTER — PATIENT OUTREACH (OUTPATIENT)
Dept: EMERGENCY MEDICINE | Facility: HOSPITAL | Age: 74
End: 2024-06-03
Payer: MEDICARE

## 2024-06-04 LAB — BACTERIA UR CULT: ABNORMAL

## 2024-06-04 NOTE — PROGRESS NOTES
Patient was seen in the ED on 6/2/24. Phoned patient on 2 separate occasions to assist with Post ED Discharge Navigation. Patient was unavailable. Message left on voice mail.  Merlin Kidd

## 2024-06-05 LAB
OHS QRS DURATION: 80 MS
OHS QTC CALCULATION: 441 MS

## 2024-06-11 ENCOUNTER — PATIENT MESSAGE (OUTPATIENT)
Dept: FAMILY MEDICINE | Facility: CLINIC | Age: 74
End: 2024-06-11
Payer: MEDICARE

## 2024-06-11 ENCOUNTER — HOSPITAL ENCOUNTER (OUTPATIENT)
Dept: RADIOLOGY | Facility: CLINIC | Age: 74
Discharge: HOME | End: 2024-06-11
Attending: INTERNAL MEDICINE
Payer: MEDICARE

## 2024-06-11 DIAGNOSIS — M85.89 OTHER SPECIFIED DISORDERS OF BONE DENSITY AND STRUCTURE, MULTIPLE SITES: ICD-10-CM

## 2024-06-11 DIAGNOSIS — M85.859 OSTEOPENIA OF NECK OF FEMUR, UNSPECIFIED LATERALITY: ICD-10-CM

## 2024-06-11 PROCEDURE — 77080 DXA BONE DENSITY AXIAL: CPT | Mod: 26,,, | Performed by: INTERNAL MEDICINE

## 2024-06-11 PROCEDURE — 77080 DXA BONE DENSITY AXIAL: CPT | Mod: TC,HCNC,PO

## 2024-06-21 ENCOUNTER — TELEPHONE (OUTPATIENT)
Dept: PULMONOLOGY | Facility: CLINIC | Age: 74
End: 2024-06-21
Payer: MEDICARE

## 2024-06-21 NOTE — TELEPHONE ENCOUNTER
Called and spoke with pt to confirm scheduling of follow up LDCT scan.  Agreeable with scheduling on June 26th, instructions given on where to go.

## 2024-06-26 DIAGNOSIS — F41.9 MILD ANXIETY: ICD-10-CM

## 2024-06-26 RX ORDER — CITALOPRAM 20 MG/1
TABLET, FILM COATED ORAL
Qty: 90 TABLET | Refills: 3 | OUTPATIENT
Start: 2024-06-26

## 2024-06-26 NOTE — TELEPHONE ENCOUNTER
No care due was identified.  [facility] Embedded Care Due Messages. Reference number: [id number].   6/26/2024 9:05:17 AM CDT

## 2024-06-27 ENCOUNTER — TELEPHONE (OUTPATIENT)
Dept: FAMILY MEDICINE | Facility: CLINIC | Age: 74
End: 2024-06-27
Payer: MEDICARE

## 2024-06-27 ENCOUNTER — HOSPITAL ENCOUNTER (OUTPATIENT)
Dept: RADIOLOGY | Facility: HOSPITAL | Age: 74
Discharge: HOME | End: 2024-06-27
Attending: INTERNAL MEDICINE
Payer: MEDICARE

## 2024-06-27 DIAGNOSIS — J98.4 PNEUMONITIS: ICD-10-CM

## 2024-06-27 DIAGNOSIS — Z87.891 HX OF TOBACCO USE, PRESENTING HAZARDS TO HEALTH: ICD-10-CM

## 2024-06-27 DIAGNOSIS — R91.1 LUNG NODULE SEEN ON IMAGING STUDY: Primary | ICD-10-CM

## 2024-06-27 PROCEDURE — 71271 CT THORAX LUNG CANCER SCR C-: CPT | Mod: 26,,, | Performed by: RADIOLOGY

## 2024-06-27 PROCEDURE — 71271 CT THORAX LUNG CANCER SCR C-: CPT | Mod: TC

## 2024-06-27 NOTE — TELEPHONE ENCOUNTER
Spoke with patient about CT lung cancer screening result.  Patient reports he has had some cough lately. I advised the patient that is likely due to the inflammation that is noted on his scan. I also advised him of the slight increase in one of the lung nodules that we need to monitor closely. I recommended for him to see a lung specialist and he agreed. Consult order placed. All questions answered.

## 2024-06-28 ENCOUNTER — TELEPHONE (OUTPATIENT)
Dept: PULMONOLOGY | Facility: CLINIC | Age: 74
End: 2024-06-28
Payer: MEDICARE

## 2024-06-28 NOTE — TELEPHONE ENCOUNTER
Called and spoke with pt to confirm scheduling appointment for 7/17, pt voiced concerns of having surgery on his knee.  Message sent to pulmonary staff.

## (undated) DEVICE — Device

## (undated) DEVICE — SET IRR URLGY 2LINE UNIV SPIKE

## (undated) DEVICE — SOL IRR NACL .9% 3000ML

## (undated) DEVICE — CATH POLLACK OPEN-END FLEXI-TI

## (undated) DEVICE — GUIDE WIRE MOTION .035 X 150CM

## (undated) DEVICE — PACK CYSTO

## (undated) DEVICE — UNDERGLOVES BIOGEL PI SIZE 7.5

## (undated) DEVICE — TRAY CYSTO BASIN

## (undated) DEVICE — ADAPTER HOSE 10FT 8MM

## (undated) DEVICE — CATH FOLLOWER HEYMAN 16FR

## (undated) DEVICE — SOL WATER STRL IRR 1000ML

## (undated) DEVICE — SEE MEDLINE ITEM 157110

## (undated) DEVICE — CATH FOLLOWER HEYMAN 22FR

## (undated) DEVICE — CATH FOLLOWER HEYMAN 10FR